# Patient Record
Sex: MALE | Race: WHITE | NOT HISPANIC OR LATINO | Employment: FULL TIME | URBAN - NONMETROPOLITAN AREA
[De-identification: names, ages, dates, MRNs, and addresses within clinical notes are randomized per-mention and may not be internally consistent; named-entity substitution may affect disease eponyms.]

---

## 2017-04-05 RX ORDER — LOSARTAN POTASSIUM 50 MG/1
TABLET ORAL
Qty: 90 TABLET | Refills: 2 | Status: SHIPPED | OUTPATIENT
Start: 2017-04-05 | End: 2017-04-20 | Stop reason: SDUPTHER

## 2017-04-20 PROBLEM — M79.609 PAIN IN EXTREMITY: Status: ACTIVE | Noted: 2017-04-20

## 2017-04-20 PROBLEM — N52.9 IMPOTENCE OF ORGANIC ORIGIN: Status: ACTIVE | Noted: 2017-04-20

## 2017-04-20 PROBLEM — I10 BENIGN ESSENTIAL HYPERTENSION: Status: ACTIVE | Noted: 2017-04-20

## 2017-04-20 PROBLEM — M25.549 HAND JOINT PAIN: Status: ACTIVE | Noted: 2017-04-20

## 2017-04-20 PROBLEM — K21.00 GASTROESOPHAGEAL REFLUX DISEASE WITH ESOPHAGITIS: Status: ACTIVE | Noted: 2017-04-20

## 2017-04-20 PROBLEM — E78.2 MULTIPLE-TYPE HYPERLIPIDEMIA: Status: ACTIVE | Noted: 2017-04-20

## 2017-04-20 PROBLEM — E55.9 VITAMIN D DEFICIENCY: Status: ACTIVE | Noted: 2017-04-20

## 2017-04-20 PROBLEM — M25.569 KNEE PAIN: Status: ACTIVE | Noted: 2017-04-20

## 2017-04-20 PROBLEM — R25.2 SPASM: Status: ACTIVE | Noted: 2017-04-20

## 2017-04-20 PROBLEM — I48.91 ATRIAL FIBRILLATION: Status: ACTIVE | Noted: 2017-04-20

## 2017-04-20 PROBLEM — M79.10 MUSCLE PAIN: Status: ACTIVE | Noted: 2017-04-20

## 2017-04-20 RX ORDER — LOSARTAN POTASSIUM 50 MG/1
50 TABLET ORAL DAILY
Qty: 90 TABLET | Refills: 3 | Status: SHIPPED | OUTPATIENT
Start: 2017-04-20 | End: 2018-03-26 | Stop reason: SDUPTHER

## 2017-04-20 RX ORDER — DILTIAZEM HYDROCHLORIDE 120 MG/1
120 TABLET, FILM COATED ORAL DAILY
Qty: 90 TABLET | Refills: 3 | Status: SHIPPED | OUTPATIENT
Start: 2017-04-20 | End: 2017-08-02 | Stop reason: SDUPTHER

## 2017-04-20 NOTE — TELEPHONE ENCOUNTER
Patient requests refills on blood pressure medicine.  Patient given an appointment for 5/22/2017 @8:00.

## 2017-04-21 ENCOUNTER — TELEPHONE (OUTPATIENT)
Dept: FAMILY MEDICINE CLINIC | Facility: CLINIC | Age: 52
End: 2017-04-21

## 2017-04-21 RX ORDER — DILTIAZEM HYDROCHLORIDE 120 MG/1
120 CAPSULE, EXTENDED RELEASE ORAL DAILY
Qty: 90 CAPSULE | Refills: 1 | Status: SHIPPED | OUTPATIENT
Start: 2017-04-21 | End: 2017-10-13 | Stop reason: SDUPTHER

## 2017-04-21 NOTE — TELEPHONE ENCOUNTER
Maya with [facility] in [place] called stating that patient is usually on Diltiazem 120 ER CAPSULES, not tablets. Resent correctly per dr keenan.

## 2017-05-22 ENCOUNTER — OFFICE VISIT (OUTPATIENT)
Dept: FAMILY MEDICINE CLINIC | Facility: CLINIC | Age: 52
End: 2017-05-22

## 2017-05-22 VITALS
OXYGEN SATURATION: 99 % | DIASTOLIC BLOOD PRESSURE: 82 MMHG | WEIGHT: 232 LBS | TEMPERATURE: 97.9 F | RESPIRATION RATE: 16 BRPM | SYSTOLIC BLOOD PRESSURE: 144 MMHG | BODY MASS INDEX: 31.46 KG/M2 | HEART RATE: 64 BPM

## 2017-05-22 DIAGNOSIS — Z00.00 ROUTINE GENERAL MEDICAL EXAMINATION AT A HEALTH CARE FACILITY: Primary | ICD-10-CM

## 2017-05-22 DIAGNOSIS — Z12.11 COLON CANCER SCREENING: ICD-10-CM

## 2017-05-22 PROCEDURE — 99396 PREV VISIT EST AGE 40-64: CPT | Performed by: INTERNAL MEDICINE

## 2017-05-23 LAB
ALBUMIN SERPL-MCNC: 4.2 G/DL (ref 3.5–5.5)
ALBUMIN/GLOB SERPL: 1.5 {RATIO} (ref 1.2–2.2)
ALP SERPL-CCNC: 112 IU/L (ref 39–117)
ALT SERPL-CCNC: 22 IU/L (ref 0–44)
AST SERPL-CCNC: 20 IU/L (ref 0–40)
BASOPHILS # BLD AUTO: 0.1 X10E3/UL (ref 0–0.2)
BASOPHILS NFR BLD AUTO: 1 %
BILIRUB SERPL-MCNC: 0.3 MG/DL (ref 0–1.2)
BUN SERPL-MCNC: 16 MG/DL (ref 6–24)
BUN/CREAT SERPL: 20 (ref 9–20)
CALCIUM SERPL-MCNC: 8.6 MG/DL (ref 8.7–10.2)
CHLORIDE SERPL-SCNC: 103 MMOL/L (ref 96–106)
CHOLEST SERPL-MCNC: 166 MG/DL (ref 100–199)
CO2 SERPL-SCNC: 24 MMOL/L (ref 18–29)
CREAT SERPL-MCNC: 0.82 MG/DL (ref 0.76–1.27)
EOSINOPHIL # BLD AUTO: 0.2 X10E3/UL (ref 0–0.4)
EOSINOPHIL NFR BLD AUTO: 3 %
ERYTHROCYTE [DISTWIDTH] IN BLOOD BY AUTOMATED COUNT: 13.6 % (ref 12.3–15.4)
GLOBULIN SER CALC-MCNC: 2.8 G/DL (ref 1.5–4.5)
GLUCOSE SERPL-MCNC: 96 MG/DL (ref 65–99)
HCT VFR BLD AUTO: 45.4 % (ref 37.5–51)
HDLC SERPL-MCNC: 33 MG/DL
HGB BLD-MCNC: 15.7 G/DL (ref 12.6–17.7)
IMM GRANULOCYTES # BLD: 0 X10E3/UL (ref 0–0.1)
IMM GRANULOCYTES NFR BLD: 0 %
LDLC SERPL CALC-MCNC: 91 MG/DL (ref 0–99)
LYMPHOCYTES # BLD AUTO: 2.7 X10E3/UL (ref 0.7–3.1)
LYMPHOCYTES NFR BLD AUTO: 32 %
MCH RBC QN AUTO: 31.1 PG (ref 26.6–33)
MCHC RBC AUTO-ENTMCNC: 34.6 G/DL (ref 31.5–35.7)
MCV RBC AUTO: 90 FL (ref 79–97)
MONOCYTES # BLD AUTO: 0.8 X10E3/UL (ref 0.1–0.9)
MONOCYTES NFR BLD AUTO: 9 %
NEUTROPHILS # BLD AUTO: 4.8 X10E3/UL (ref 1.4–7)
NEUTROPHILS NFR BLD AUTO: 55 %
PLATELET # BLD AUTO: 220 X10E3/UL (ref 150–379)
POTASSIUM SERPL-SCNC: 4.1 MMOL/L (ref 3.5–5.2)
PROT SERPL-MCNC: 7 G/DL (ref 6–8.5)
RBC # BLD AUTO: 5.05 X10E6/UL (ref 4.14–5.8)
SODIUM SERPL-SCNC: 143 MMOL/L (ref 134–144)
TRIGL SERPL-MCNC: 209 MG/DL (ref 0–149)
VLDLC SERPL CALC-MCNC: 42 MG/DL (ref 5–40)
WBC # BLD AUTO: 8.6 X10E3/UL (ref 3.4–10.8)

## 2017-07-10 ENCOUNTER — PREP FOR SURGERY (OUTPATIENT)
Dept: OTHER | Facility: HOSPITAL | Age: 52
End: 2017-07-10

## 2017-07-10 ENCOUNTER — OFFICE VISIT (OUTPATIENT)
Dept: GASTROENTEROLOGY | Facility: CLINIC | Age: 52
End: 2017-07-10

## 2017-07-10 VITALS
SYSTOLIC BLOOD PRESSURE: 150 MMHG | WEIGHT: 233 LBS | BODY MASS INDEX: 32.62 KG/M2 | HEIGHT: 71 IN | TEMPERATURE: 97.8 F | DIASTOLIC BLOOD PRESSURE: 91 MMHG | HEART RATE: 78 BPM | RESPIRATION RATE: 15 BRPM

## 2017-07-10 DIAGNOSIS — Z12.11 COLON CANCER SCREENING: Primary | ICD-10-CM

## 2017-07-10 PROCEDURE — S0260 H&P FOR SURGERY: HCPCS | Performed by: NURSE PRACTITIONER

## 2017-07-10 RX ORDER — SODIUM CHLORIDE 9 MG/ML
70 INJECTION, SOLUTION INTRAVENOUS CONTINUOUS PRN
Status: CANCELLED | OUTPATIENT
Start: 2017-07-10

## 2017-07-10 RX ORDER — SODIUM CHLORIDE 0.9 % (FLUSH) 0.9 %
1-10 SYRINGE (ML) INJECTION AS NEEDED
Status: CANCELLED | OUTPATIENT
Start: 2017-07-10

## 2017-07-10 NOTE — PROGRESS NOTES
[address] [facility] [address]    Chief Complaint   Patient presents with   • Colon Cancer Screening     History of Present Illness     The patient denies recent change in bowel habits. There is no diarrhea or constipation. There is no history of abdominal pain. There is no history of overt GI bleed (hematemesis melena or hematochezia). The patient denies nausea or vomiting. There is no history of reflux. The patient denies dysphagia or odynophagia. There is no history of recent significant weight loss. There is no history of liver disease in the past. There is no family history of colon cancer. The patient has not had a colonoscopy in the past.    Review of Systems   Constitutional: Negative for appetite change, chills, fatigue, fever and unexpected weight change.   HENT: Negative for mouth sores, nosebleeds and trouble swallowing.    Eyes: Negative for discharge and redness.   Respiratory: Negative for apnea, cough and shortness of breath.    Cardiovascular: Negative for chest pain, palpitations and leg swelling.   Gastrointestinal: Negative for abdominal distention, abdominal pain, anal bleeding, blood in stool, constipation, diarrhea, nausea and vomiting.   Endocrine: Negative for cold intolerance, heat intolerance and polydipsia.   Genitourinary: Negative for dysuria, hematuria and urgency.   Musculoskeletal: Negative for arthralgias, joint swelling and myalgias.   Skin: Negative for rash.   Allergic/Immunologic: Negative for food allergies and immunocompromised state.   Neurological: Negative for dizziness, seizures, syncope and headaches.   Hematological: Negative for adenopathy. Bruises/bleeds easily.   Psychiatric/Behavioral: Negative for dysphoric mood. The patient is not nervous/anxious and is not hyperactive.      Patient Active Problem List   Diagnosis   • Atrial fibrillation   • Benign essential hypertension   • Gastroesophageal reflux disease with esophagitis   • Knee pain   • Impotence of  "organic origin   • Multiple-type hyperlipidemia   • Spasm   • Muscle pain   • Hand joint pain   • Pain in extremity   • Vitamin D deficiency   • Colon cancer screening     Past Medical History:   Diagnosis Date   • Allergy    • Atrial fibrillation    • Cough    • ED (erectile dysfunction) of non-organic origin    • Foot pain    • Hyperlipidemia    • Hypertension      Past Surgical History:   Procedure Laterality Date   • FACIAL RECONSTRUCTION SURGERY      from dog bite as a child   • KIDNEY STONE SURGERY     • KNEE SURGERY  2015     Family History   Problem Relation Age of Onset   • Other Other      brain tumor, renal disease   • Cancer Other    • Stroke Other    • Colon cancer Neg Hx      Social History   Substance Use Topics   • Smoking status: Never Smoker   • Smokeless tobacco: Never Used   • Alcohol use No       Current Outpatient Prescriptions:   •  aspirin 81 MG tablet, Take  by mouth., Disp: , Rfl:   •  diltiaZEM (CARDIZEM) 120 MG tablet, Take 1 tablet by mouth Daily., Disp: 90 tablet, Rfl: 3  •  diltiazem XR (DILACOR XR) 120 MG 24 hr capsule, Take 1 capsule by mouth Daily., Disp: 90 capsule, Rfl: 1  •  losartan (COZAAR) 50 MG tablet, Take 1 tablet by mouth Daily., Disp: 90 tablet, Rfl: 3  •  tadalafil (CIALIS) 20 MG tablet, Take 1 tablet by mouth as needed. 1 hour before activity as needed, Disp: , Rfl:     No Known Allergies    /91  Pulse 78  Temp 97.8 °F (36.6 °C)  Resp 15  Ht 71\" (180.3 cm)  Wt 233 lb (106 kg)  BMI 32.5 kg/m2    Physical Exam   Constitutional: He is oriented to person, place, and time. He appears well-developed and well-nourished. No distress.   HENT:   Head: Normocephalic and atraumatic.   Right Ear: Hearing and external ear normal.   Left Ear: Hearing and external ear normal.   Nose: Nose normal.   Mouth/Throat: Oropharynx is clear and moist and mucous membranes are normal. Mucous membranes are not pale, not dry and not cyanotic. No oral lesions. No oropharyngeal exudate. "   Eyes: Conjunctivae and EOM are normal. Right eye exhibits no discharge. Left eye exhibits no discharge.   Neck: Trachea normal. Neck supple. No JVD present. No edema present. No thyroid mass and no thyromegaly present.   Cardiovascular: Normal rate, regular rhythm, S2 normal and normal heart sounds.  Exam reveals no gallop, no S3 and no friction rub.    No murmur heard.  Pulmonary/Chest: Effort normal and breath sounds normal. No respiratory distress. He exhibits no tenderness.   Abdominal: Normal appearance and bowel sounds are normal. He exhibits no distension, no ascites and no mass. There is no splenomegaly or hepatomegaly. There is no tenderness. There is no rigidity, no rebound and no guarding. No hernia.       Vascular Status -  His exam exhibits no right foot edema. His exam exhibits no left foot edema.  Lymphadenopathy:     He has no cervical adenopathy.        Left: No supraclavicular adenopathy present.   Neurological: He is alert and oriented to person, place, and time. He has normal strength. No cranial nerve deficit or sensory deficit.   Skin: No rash noted. He is not diaphoretic. No cyanosis. No pallor. Nails show no clubbing.   Psychiatric: He has a normal mood and affect.   Nursing note and vitals reviewed.  Stigmata of chronic liver disease:  None.  Asterixis:  None.    Laboratory Results:  Upon review of records:    Dated 5/22/2017 glucose 96 BUN 16 creatinine 0.82 sodium 143 potassium 4.1 chloride 103 CO2 24 calcium 8.6 albumin 4.2 total bilirubin 0.3 alkaline phosphatase 112 AST 20 ALT 22 WBC 8.6 hemoglobin 15.7 hematocrit 45.4 platelet count 220 MCV 90    Assessment and Plan:      Benjie was seen today for colon cancer screening.    Diagnoses and all orders for this visit:    Colon cancer screening  Comments:  No previous colonoscopy. No family history of colon cancer.        Plan  and Patient Instructions:  Patient Instructions   1. Colonoscopy: Description of the procedure, risks, benefits,  alternatives and options, including nonoperative options, were discussed with the patient in detail. The patient understands and wishes to proceed.    Patient Care Team:  Evie Martin MD as PCP - General    Cristi Smith, APRN

## 2017-08-02 ENCOUNTER — APPOINTMENT (OUTPATIENT)
Dept: PREADMISSION TESTING | Facility: HOSPITAL | Age: 52
End: 2017-08-02

## 2017-08-07 ENCOUNTER — ANESTHESIA EVENT (OUTPATIENT)
Dept: GASTROENTEROLOGY | Facility: HOSPITAL | Age: 52
End: 2017-08-07

## 2017-08-07 ENCOUNTER — HOSPITAL ENCOUNTER (OUTPATIENT)
Facility: HOSPITAL | Age: 52
Setting detail: HOSPITAL OUTPATIENT SURGERY
Discharge: HOME | End: 2017-08-07
Attending: INTERNAL MEDICINE | Admitting: INTERNAL MEDICINE

## 2017-08-07 ENCOUNTER — ANESTHESIA (OUTPATIENT)
Dept: GASTROENTEROLOGY | Facility: HOSPITAL | Age: 52
End: 2017-08-07

## 2017-08-07 VITALS
BODY MASS INDEX: 32.62 KG/M2 | DIASTOLIC BLOOD PRESSURE: 77 MMHG | OXYGEN SATURATION: 94 % | RESPIRATION RATE: 14 BRPM | HEIGHT: 71 IN | SYSTOLIC BLOOD PRESSURE: 117 MMHG | TEMPERATURE: 97.4 F | WEIGHT: 233 LBS | HEART RATE: 64 BPM

## 2017-08-07 DIAGNOSIS — Z12.11 SCREEN FOR COLON CANCER: ICD-10-CM

## 2017-08-07 DIAGNOSIS — Z12.11 COLON CANCER SCREENING: ICD-10-CM

## 2017-08-07 PROCEDURE — 25010000002 MIDAZOLAM PER 1 MG: Performed by: NURSE ANESTHETIST, CERTIFIED REGISTERED

## 2017-08-07 PROCEDURE — 25010000002 MEPERIDINE PER 100 MG: Performed by: NURSE ANESTHETIST, CERTIFIED REGISTERED

## 2017-08-07 PROCEDURE — 45380 COLONOSCOPY AND BIOPSY: CPT | Performed by: INTERNAL MEDICINE

## 2017-08-07 PROCEDURE — 25010000002 PROPOFOL 1000 MG/100ML EMULSION: Performed by: NURSE ANESTHETIST, CERTIFIED REGISTERED

## 2017-08-07 RX ORDER — PROPOFOL 10 MG/ML
INJECTION, EMULSION INTRAVENOUS AS NEEDED
Status: DISCONTINUED | OUTPATIENT
Start: 2017-08-07 | End: 2017-08-07 | Stop reason: SURG

## 2017-08-07 RX ORDER — SODIUM CHLORIDE 0.9 % (FLUSH) 0.9 %
1-10 SYRINGE (ML) INJECTION AS NEEDED
Status: DISCONTINUED | OUTPATIENT
Start: 2017-08-07 | End: 2017-08-07 | Stop reason: HOSPADM

## 2017-08-07 RX ORDER — SODIUM CHLORIDE 9 MG/ML
70 INJECTION, SOLUTION INTRAVENOUS CONTINUOUS PRN
Status: DISCONTINUED | OUTPATIENT
Start: 2017-08-07 | End: 2017-08-07 | Stop reason: HOSPADM

## 2017-08-07 RX ORDER — MEPERIDINE HYDROCHLORIDE 50 MG/ML
INJECTION INTRAMUSCULAR; INTRAVENOUS; SUBCUTANEOUS AS NEEDED
Status: DISCONTINUED | OUTPATIENT
Start: 2017-08-07 | End: 2017-08-07 | Stop reason: SURG

## 2017-08-07 RX ORDER — MIDAZOLAM HYDROCHLORIDE 1 MG/ML
INJECTION INTRAMUSCULAR; INTRAVENOUS AS NEEDED
Status: DISCONTINUED | OUTPATIENT
Start: 2017-08-07 | End: 2017-08-07 | Stop reason: SURG

## 2017-08-07 RX ADMIN — PROPOFOL 50 MG: 10 INJECTION, EMULSION INTRAVENOUS at 10:55

## 2017-08-07 RX ADMIN — MIDAZOLAM HYDROCHLORIDE 2 MG: 1 INJECTION, SOLUTION INTRAMUSCULAR; INTRAVENOUS at 10:53

## 2017-08-07 RX ADMIN — PROPOFOL 50 MG: 10 INJECTION, EMULSION INTRAVENOUS at 11:06

## 2017-08-07 RX ADMIN — SODIUM CHLORIDE 70 ML/HR: 9 INJECTION, SOLUTION INTRAVENOUS at 09:58

## 2017-08-07 RX ADMIN — PROPOFOL 50 MG: 10 INJECTION, EMULSION INTRAVENOUS at 10:58

## 2017-08-07 RX ADMIN — PROPOFOL 50 MG: 10 INJECTION, EMULSION INTRAVENOUS at 11:02

## 2017-08-07 RX ADMIN — MIDAZOLAM HYDROCHLORIDE 2 MG: 1 INJECTION, SOLUTION INTRAMUSCULAR; INTRAVENOUS at 10:56

## 2017-08-07 RX ADMIN — MEPERIDINE HYDROCHLORIDE 50 MG: 50 INJECTION INTRAMUSCULAR; INTRAVENOUS; SUBCUTANEOUS at 10:56

## 2017-08-07 NOTE — PLAN OF CARE
Problem: GI Endoscopy (Adult)  Goal: Signs and Symptoms of Listed Potential Problems Will be Absent or Manageable (GI Endoscopy)  Outcome: Ongoing (interventions implemented as appropriate)    08/07/17 0949   GI Endoscopy   Problems Assessed (GI Endoscopy) all   Problems Present (GI Endoscopy) none

## 2017-08-07 NOTE — ANESTHESIA POSTPROCEDURE EVALUATION
Patient: Benjie Mcgovern    Procedure Summary     Date Anesthesia Start Anesthesia Stop Room / Location    08/07/17 1051  [facility] ENDOSCOPY 2 / [facility] ENDOSCOPY       Procedure Diagnosis Surgeon Provider    COLONOSCOPY WITH BXS, POLYPECTOMY (N/A Anus) No diagnosis on file. MD Segundo Denny CRNA          Anesthesia Type: MAC  Last vitals  BP        Temp        Pulse       Resp        SpO2          Post Anesthesia Care and Evaluation    Patient location during evaluation: PHASE II  Patient participation: complete - patient participated  Level of consciousness: awake  Pain score: 0  Pain management: adequate  Airway patency: patent  Anesthetic complications: No anesthetic complications  PONV Status: none  Cardiovascular status: acceptable  Respiratory status: acceptable  Hydration status: acceptable    Comments: vsss resp spont, reflexes intact, responsive, report given to pacu nurse

## 2017-08-07 NOTE — H&P (VIEW-ONLY)
[address] [facility] [address]    Chief Complaint   Patient presents with   • Colon Cancer Screening     History of Present Illness     The patient denies recent change in bowel habits. There is no diarrhea or constipation. There is no history of abdominal pain. There is no history of overt GI bleed (hematemesis melena or hematochezia). The patient denies nausea or vomiting. There is no history of reflux. The patient denies dysphagia or odynophagia. There is no history of recent significant weight loss. There is no history of liver disease in the past. There is no family history of colon cancer. The patient has not had a colonoscopy in the past.    Review of Systems   Constitutional: Negative for appetite change, chills, fatigue, fever and unexpected weight change.   HENT: Negative for mouth sores, nosebleeds and trouble swallowing.    Eyes: Negative for discharge and redness.   Respiratory: Negative for apnea, cough and shortness of breath.    Cardiovascular: Negative for chest pain, palpitations and leg swelling.   Gastrointestinal: Negative for abdominal distention, abdominal pain, anal bleeding, blood in stool, constipation, diarrhea, nausea and vomiting.   Endocrine: Negative for cold intolerance, heat intolerance and polydipsia.   Genitourinary: Negative for dysuria, hematuria and urgency.   Musculoskeletal: Negative for arthralgias, joint swelling and myalgias.   Skin: Negative for rash.   Allergic/Immunologic: Negative for food allergies and immunocompromised state.   Neurological: Negative for dizziness, seizures, syncope and headaches.   Hematological: Negative for adenopathy. Bruises/bleeds easily.   Psychiatric/Behavioral: Negative for dysphoric mood. The patient is not nervous/anxious and is not hyperactive.      Patient Active Problem List   Diagnosis   • Atrial fibrillation   • Benign essential hypertension   • Gastroesophageal reflux disease with esophagitis   • Knee pain   • Impotence of  "organic origin   • Multiple-type hyperlipidemia   • Spasm   • Muscle pain   • Hand joint pain   • Pain in extremity   • Vitamin D deficiency   • Colon cancer screening     Past Medical History:   Diagnosis Date   • Allergy    • Atrial fibrillation    • Cough    • ED (erectile dysfunction) of non-organic origin    • Foot pain    • Hyperlipidemia    • Hypertension      Past Surgical History:   Procedure Laterality Date   • FACIAL RECONSTRUCTION SURGERY      from dog bite as a child   • KIDNEY STONE SURGERY     • KNEE SURGERY  2015     Family History   Problem Relation Age of Onset   • Other Other      brain tumor, renal disease   • Cancer Other    • Stroke Other    • Colon cancer Neg Hx      Social History   Substance Use Topics   • Smoking status: Never Smoker   • Smokeless tobacco: Never Used   • Alcohol use No       Current Outpatient Prescriptions:   •  aspirin 81 MG tablet, Take  by mouth., Disp: , Rfl:   •  diltiaZEM (CARDIZEM) 120 MG tablet, Take 1 tablet by mouth Daily., Disp: 90 tablet, Rfl: 3  •  diltiazem XR (DILACOR XR) 120 MG 24 hr capsule, Take 1 capsule by mouth Daily., Disp: 90 capsule, Rfl: 1  •  losartan (COZAAR) 50 MG tablet, Take 1 tablet by mouth Daily., Disp: 90 tablet, Rfl: 3  •  tadalafil (CIALIS) 20 MG tablet, Take 1 tablet by mouth as needed. 1 hour before activity as needed, Disp: , Rfl:     No Known Allergies    /91  Pulse 78  Temp 97.8 °F (36.6 °C)  Resp 15  Ht 71\" (180.3 cm)  Wt 233 lb (106 kg)  BMI 32.5 kg/m2    Physical Exam   Constitutional: He is oriented to person, place, and time. He appears well-developed and well-nourished. No distress.   HENT:   Head: Normocephalic and atraumatic.   Right Ear: Hearing and external ear normal.   Left Ear: Hearing and external ear normal.   Nose: Nose normal.   Mouth/Throat: Oropharynx is clear and moist and mucous membranes are normal. Mucous membranes are not pale, not dry and not cyanotic. No oral lesions. No oropharyngeal exudate. "   Eyes: Conjunctivae and EOM are normal. Right eye exhibits no discharge. Left eye exhibits no discharge.   Neck: Trachea normal. Neck supple. No JVD present. No edema present. No thyroid mass and no thyromegaly present.   Cardiovascular: Normal rate, regular rhythm, S2 normal and normal heart sounds.  Exam reveals no gallop, no S3 and no friction rub.    No murmur heard.  Pulmonary/Chest: Effort normal and breath sounds normal. No respiratory distress. He exhibits no tenderness.   Abdominal: Normal appearance and bowel sounds are normal. He exhibits no distension, no ascites and no mass. There is no splenomegaly or hepatomegaly. There is no tenderness. There is no rigidity, no rebound and no guarding. No hernia.       Vascular Status -  His exam exhibits no right foot edema. His exam exhibits no left foot edema.  Lymphadenopathy:     He has no cervical adenopathy.        Left: No supraclavicular adenopathy present.   Neurological: He is alert and oriented to person, place, and time. He has normal strength. No cranial nerve deficit or sensory deficit.   Skin: No rash noted. He is not diaphoretic. No cyanosis. No pallor. Nails show no clubbing.   Psychiatric: He has a normal mood and affect.   Nursing note and vitals reviewed.  Stigmata of chronic liver disease:  None.  Asterixis:  None.    Laboratory Results:  Upon review of records:    Dated 5/22/2017 glucose 96 BUN 16 creatinine 0.82 sodium 143 potassium 4.1 chloride 103 CO2 24 calcium 8.6 albumin 4.2 total bilirubin 0.3 alkaline phosphatase 112 AST 20 ALT 22 WBC 8.6 hemoglobin 15.7 hematocrit 45.4 platelet count 220 MCV 90    Assessment and Plan:      Benjie was seen today for colon cancer screening.    Diagnoses and all orders for this visit:    Colon cancer screening  Comments:  No previous colonoscopy. No family history of colon cancer.        Plan  and Patient Instructions:  Patient Instructions   1. Colonoscopy: Description of the procedure, risks, benefits,  alternatives and options, including nonoperative options, were discussed with the patient in detail. The patient understands and wishes to proceed.    Patient Care Team:  Evie Martin MD as PCP - General    Cristi Smith, APRN

## 2017-08-07 NOTE — OP NOTE
PROCEDURE:  Colonoscopy to the terminal ileum with one cold biopsy polypectomy, and biopsies.     DATE OF PROCEDURE:  August 7, 2017    REFERRING PROVIDER:  Evie Martin MD     INSTRUMENT USED:  Olympus PCF H 190 videocolonoscope.      INDICATIONS OF THE PROCEDURE: This is a 52-year-old white male for colon cancer screening.       BIOPSIES:  Polypoid area in terminal ileum.  A cold biopsy polypectomy was performed in the ascending colon.      PHOTOGRAPHS:  Photographs were included in the medical records.     MEDICATIONS:  MAC.       CONSENT/PREPROCEDURE EVALUATION:  Risks, benefits, alternatives and options of the procedure including risks of sedation/anesthesia were discussed with the patient and informed consent was obtained prior to the procedure.  History and physical examination were performed and nothing precluded the test.      REPORT:  The patient was placed in left lateral decubitus position and a digital examination was performed.  Once under the influence of IV sedation, the instrument was inserted into the rectum and advanced under direct vision to cecum which was identified by the ileocecal valve, triradiate folds and appendiceal orifice. The scope was then maneuvered into the terminal ileum.        FINDINGS:      Digital rectal examination:  Good anal tone.  No perianal pathology.  No mass.        Terminal ileum: 3-4 cm.  Somewhat polypoid area was noted in the terminal ileum.  This was biopsied.      Cecum and ascending colon: A 2-3 mm diminutive polyp was noted in the ascending colon.  This was removed with cold biopsy forceps.     Hepatic flexure, transverse colon, splenic flexure:  Normal.         Descending colon, sigmoid colon and rectum: Scant early diverticular change was noted in the left colon.  A retroflex examination within the rectum revealed internal hemorrhoids.        The scope was then straightened, the lower GI tract was decompressed, and the scope was pulled out of the patient.   The patient tolerated the procedure well.  There were no immediate complications and the patient was transferred in stable condition for post procedure observation.      TECHNICAL DATA:   1. Manor prep score: 9 (3+3+3).     2. Difficulty of examination:  Average.   3. Anus to cecal time 2 minutes.    4. Withdrawal time: 8 min.   5. Procedure Time: 13 minutes.   6. Retroflex examination in right colon: No.    7. Second look Rectum to cecum with decompression: Yes.    DIAGNOSES:    1. Scant early diverticular change in the left colon.  2. Small diminutive polyp.  3. Internal hemorrhoids.    RECOMMENDATIONS:     1. Follow biopsies.    2. Follow-up: The patient may call the office in 1 week regarding biopsy results.  However, the patient may follow-up in the office in 4 weeks if desired.  3. Followup colonoscopy in 5 years.     4. Dietary instructions.     Thank you very much for letting me participate in the care of this patient. Please do not hesitate to call me if you have any questions.

## 2017-08-07 NOTE — DISCHARGE INSTRUCTIONS
Rest today  No pushing,pulling,tugging,heavy lifting, or strenuous activity   No major decision making,driving,or drinking alcoholic beverages for 24 hours due to the sedation you received  Always use good hand hygiene/washing technique  No driving on pain medication.     To assist you in voiding:  Drink plenty of fluids  Listen to running water while attempting to void.    If you are unable to urinate and you have an uncomfortable urge to void or it has been   6 hours since you were discharged, return to the Emergency Room.        Diet:     Low Fat Diet.       Other Instructions:      Call [facility] at [phone] or come to the Emergency   Department if you experience the following: Chest pain, abdominal pain, bleeding  (vomiting of blood or coffee colored material, black stools or leela blood in stools),   fever/chills, nausea and vomiting or dizziness.    The patient may call the office in 1 week regarding biopsy results.  However, the patient may follow-up in the office in 4 weeks if desired.

## 2017-08-07 NOTE — ANESTHESIA PREPROCEDURE EVALUATION
Anesthesia Evaluation     Patient summary reviewed and Nursing notes reviewed   no history of anesthetic complications:  NPO Solid Status: > 8 hours  NPO Liquid Status: > 6 hours     Airway   Mallampati: II  TM distance: >3 FB  Neck ROM: full  possible difficult intubation  Dental - normal exam     Pulmonary - normal exam   (+) sleep apnea,   (-) not a smoker  Cardiovascular - normal exam    (+) hypertension, dysrhythmias Atrial Fib, hyperlipidemia      Neuro/Psych  (+) psychiatric history Anxiety and Depression,    GI/Hepatic/Renal/Endo    (+) obesity,      Musculoskeletal     (+) arthralgias, back pain, chronic pain,   Abdominal  - normal exam   Substance History      OB/GYN          Other   (+) arthritis                                   Anesthesia Plan    ASA 3     MAC   (Risks and benefits discussed including risk of aspiration, recall and dental damage. All patient questions answered. Will continue with POC.)  intravenous induction   Anesthetic plan and risks discussed with patient.

## 2017-08-08 ENCOUNTER — TELEPHONE (OUTPATIENT)
Dept: FAMILY MEDICINE CLINIC | Facility: CLINIC | Age: 52
End: 2017-08-08

## 2017-08-08 NOTE — TELEPHONE ENCOUNTER
Patient has a bulge above his belly button and it is very tender if he brushes against it and when lifting heavy packages.  He thinks he may have a hernia.  What are his options?

## 2017-08-10 LAB
LAB AP CASE REPORT: NORMAL
LAB AP CLINICAL INFORMATION: NORMAL
Lab: NORMAL
PATH REPORT.FINAL DX SPEC: NORMAL

## 2017-08-14 ENCOUNTER — TELEPHONE (OUTPATIENT)
Dept: GASTROENTEROLOGY | Facility: CLINIC | Age: 52
End: 2017-08-14

## 2017-08-14 NOTE — TELEPHONE ENCOUNTER
----- Message from Cristel King MA sent at 8/14/2017 12:56 PM EDT -----  Would like colon results. His was a little more than just the polyps so wanted to make sure everything was okay. You can call or let me know. Thanks!      Called and discussed results with patient. Recommended follow up colonoscopy in 5 years. Patient verbalizes understanding.

## 2017-08-30 ENCOUNTER — TELEPHONE (OUTPATIENT)
Dept: FAMILY MEDICINE CLINIC | Facility: CLINIC | Age: 52
End: 2017-08-30

## 2017-08-30 NOTE — TELEPHONE ENCOUNTER
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  He has to do a urine drug screen so he is not allowed to take anything that would interfere with that.

## 2017-10-13 RX ORDER — DILTIAZEM HYDROCHLORIDE 120 MG/1
CAPSULE, COATED, EXTENDED RELEASE ORAL
Qty: 90 CAPSULE | Refills: 1 | Status: SHIPPED | OUTPATIENT
Start: 2017-10-13 | End: 2017-11-06 | Stop reason: SDUPTHER

## 2017-11-06 ENCOUNTER — OFFICE VISIT (OUTPATIENT)
Dept: FAMILY MEDICINE CLINIC | Facility: CLINIC | Age: 52
End: 2017-11-06

## 2017-11-06 VITALS
WEIGHT: 235 LBS | HEART RATE: 66 BPM | TEMPERATURE: 97.8 F | BODY MASS INDEX: 32.78 KG/M2 | SYSTOLIC BLOOD PRESSURE: 131 MMHG | RESPIRATION RATE: 16 BRPM | DIASTOLIC BLOOD PRESSURE: 88 MMHG | OXYGEN SATURATION: 100 %

## 2017-11-06 DIAGNOSIS — I10 BENIGN ESSENTIAL HYPERTENSION: Primary | ICD-10-CM

## 2017-11-06 DIAGNOSIS — E78.00 PURE HYPERCHOLESTEROLEMIA: ICD-10-CM

## 2017-11-06 DIAGNOSIS — I48.20 CHRONIC ATRIAL FIBRILLATION (HCC): ICD-10-CM

## 2017-11-06 LAB
ALBUMIN SERPL-MCNC: 4.3 G/DL (ref 3.5–5)
ALBUMIN/GLOB SERPL: 1.4 G/DL (ref 1–2)
ALP SERPL-CCNC: 117 U/L (ref 38–126)
ALT SERPL-CCNC: 42 U/L (ref 13–69)
AST SERPL-CCNC: 26 U/L (ref 15–46)
BASOPHILS # BLD AUTO: 0.07 10*3/MM3 (ref 0–0.2)
BASOPHILS NFR BLD AUTO: 0.8 % (ref 0–2.5)
BILIRUB SERPL-MCNC: 0.4 MG/DL (ref 0.2–1.3)
BUN SERPL-MCNC: 17 MG/DL (ref 7–20)
BUN/CREAT SERPL: 21.3 (ref 6.3–21.9)
CALCIUM SERPL-MCNC: 9.7 MG/DL (ref 8.4–10.2)
CHLORIDE SERPL-SCNC: 105 MMOL/L (ref 98–107)
CHOLEST SERPL-MCNC: 171 MG/DL (ref 0–199)
CO2 SERPL-SCNC: 24 MMOL/L (ref 26–30)
CREAT SERPL-MCNC: 0.8 MG/DL (ref 0.6–1.3)
EOSINOPHIL # BLD AUTO: 0.16 10*3/MM3 (ref 0–0.7)
EOSINOPHIL NFR BLD AUTO: 1.9 % (ref 0–7)
ERYTHROCYTE [DISTWIDTH] IN BLOOD BY AUTOMATED COUNT: 12.5 % (ref 11.5–14.5)
GFR SERPLBLD CREATININE-BSD FMLA CKD-EPI: 102 ML/MIN/1.73
GFR SERPLBLD CREATININE-BSD FMLA CKD-EPI: 124 ML/MIN/1.73
GLOBULIN SER CALC-MCNC: 3 GM/DL
GLUCOSE SERPL-MCNC: 98 MG/DL (ref 74–98)
HCT VFR BLD AUTO: 50.1 % (ref 42–52)
HDLC SERPL-MCNC: 36 MG/DL (ref 40–60)
HGB BLD-MCNC: 17 G/DL (ref 14–18)
IMM GRANULOCYTES # BLD: 0.02 10*3/MM3 (ref 0–0.06)
IMM GRANULOCYTES NFR BLD: 0.2 % (ref 0–0.6)
LDLC SERPL CALC-MCNC: 98 MG/DL (ref 0–99)
LYMPHOCYTES # BLD AUTO: 2.83 10*3/MM3 (ref 0.6–3.4)
LYMPHOCYTES NFR BLD AUTO: 34.3 % (ref 10–50)
MCH RBC QN AUTO: 30.9 PG (ref 27–31)
MCHC RBC AUTO-ENTMCNC: 33.9 G/DL (ref 30–37)
MCV RBC AUTO: 90.9 FL (ref 80–94)
MONOCYTES # BLD AUTO: 0.79 10*3/MM3 (ref 0–0.9)
MONOCYTES NFR BLD AUTO: 9.6 % (ref 0–12)
NEUTROPHILS # BLD AUTO: 4.38 10*3/MM3 (ref 2–6.9)
NEUTROPHILS NFR BLD AUTO: 53.2 % (ref 37–80)
NRBC BLD AUTO-RTO: 0 /100 WBC (ref 0–0)
PLATELET # BLD AUTO: 231 10*3/MM3 (ref 130–400)
POTASSIUM SERPL-SCNC: 4.4 MMOL/L (ref 3.5–5.1)
PROT SERPL-MCNC: 7.3 G/DL (ref 6.3–8.2)
RBC # BLD AUTO: 5.51 10*6/MM3 (ref 4.7–6.1)
SODIUM SERPL-SCNC: 143 MMOL/L (ref 137–145)
TRIGL SERPL-MCNC: 187 MG/DL
VLDLC SERPL CALC-MCNC: 37.4 MG/DL
WBC # BLD AUTO: 8.25 10*3/MM3 (ref 4.8–10.8)

## 2017-11-06 PROCEDURE — 99214 OFFICE O/P EST MOD 30 MIN: CPT | Performed by: INTERNAL MEDICINE

## 2017-11-06 RX ORDER — DILTIAZEM HYDROCHLORIDE 120 MG/1
120 CAPSULE, COATED, EXTENDED RELEASE ORAL NIGHTLY
Qty: 90 CAPSULE | Refills: 2 | Status: SHIPPED | OUTPATIENT
Start: 2017-11-06 | End: 2018-03-26 | Stop reason: SDUPTHER

## 2017-11-06 NOTE — PROGRESS NOTES
Subjective   Benjie Mcgovern is a 52 y.o. male.     Chief Complaint   Patient presents with   • Hypertension   • Hyperlipidemia   • Atrial Fibrillation       History of Present Illness   HPI: Patient is here to follow-up on his blood pressure. He has been taking medications as prescribed. His blood pressures been stable. Patient Is also here to follow-up on his atrial fibrillation and the rate is controlled on his current medication, Patient is also here to follow up on his cholesterol and is fasting for labs today , he delcines a flu shot , he needs refills on his medications . He had a colonoscopy  Primary Care Cardiac Diagnostic Constellation: The patient is here today for a follow-up visit.   His hypertension is primary, but stable. The patient is adherent with his medication regimen. He denies medication side effects.   His hyperlipidemia has been stable. His LDL goal is 100 mg/dL.   Symptoms: Denies chest pain.   Lifestyle and Disease Management:     The following portions of the patient's history were reviewed and updated as appropriate: allergies, current medications, past family history, past medical history, past social history, past surgical history and problem list.    Review of Systems   Constitutional: Negative for appetite change, fatigue and fever.   HENT: Negative for congestion, ear discharge, ear pain, sinus pressure and sore throat.    Eyes: Negative for pain and discharge.   Respiratory: Negative for cough, shortness of breath and wheezing.    Cardiovascular: Negative for chest pain, palpitations and leg swelling.   Gastrointestinal: Negative for abdominal pain, constipation, diarrhea, nausea and vomiting.   Endocrine: Negative for cold intolerance and heat intolerance.   Genitourinary: Negative for dysuria and flank pain.   Musculoskeletal: Negative for arthralgias and joint swelling.   Skin: Negative for pallor and rash.   Allergic/Immunologic: Negative for environmental allergies and food  allergies.   Neurological: Negative for dizziness, weakness and numbness.   Hematological: Negative for adenopathy. Does not bruise/bleed easily.   Psychiatric/Behavioral: Negative for behavioral problems and dysphoric mood. The patient is not nervous/anxious.          Current Outpatient Prescriptions:   •  aspirin 81 MG tablet, Take 81 mg by mouth Daily., Disp: , Rfl:   •  diltiaZEM CD (CARDIZEM CD) 120 MG 24 hr capsule, Take 1 capsule by mouth Every Night., Disp: 90 capsule, Rfl: 2  •  losartan (COZAAR) 50 MG tablet, Take 1 tablet by mouth Daily., Disp: 90 tablet, Rfl: 3    Objective     Blood pressure 131/88, pulse 66, temperature 97.8 °F (36.6 °C), temperature source Oral, resp. rate 16, weight 235 lb (107 kg), SpO2 100 %.    Physical Exam   Constitutional: He is oriented to person, place, and time. He appears well-developed and well-nourished. No distress.   HENT:   Head: Normocephalic and atraumatic.   Right Ear: External ear normal.   Left Ear: External ear normal.   Nose: Nose normal.   Mouth/Throat: Oropharynx is clear and moist.   Eyes: Conjunctivae and EOM are normal. Pupils are equal, round, and reactive to light.   Neck: Normal range of motion. Neck supple. No thyromegaly present.   Cardiovascular: Normal rate, regular rhythm and normal heart sounds.    Pulmonary/Chest: Effort normal. No respiratory distress.   Abdominal: Soft. He exhibits no distension. There is no tenderness. There is no guarding.   Musculoskeletal: Normal range of motion. He exhibits no edema.   Lymphadenopathy:     He has no cervical adenopathy.   Neurological: He is alert and oriented to person, place, and time.   No gross motor or sensory deficits   Skin: Skin is warm and dry. He is not diaphoretic. No erythema.   Psychiatric: He has a normal mood and affect.   Nursing note and vitals reviewed.      Results for orders placed or performed during the hospital encounter of 08/07/17   Tissue Pathology Exam   Result Value Ref Range     Case Report       Surgical Pathology Report                         Case: [id number]                                  Authorizing Provider:  Fermín Hamilton MD          Collected:           08/07/2017 11:09 AM          Ordering Location:     [facility]    Received:            08/07/2017 12:56 PM                                 [facility]                                                                    Pathologist:           Gary MCCRACKEN MD                                                           Specimens:   1) - Small Intestine, Ileum, terminal ileum nxs                                                     2) - Large Intestine, Right / Ascending Colon, ASCENDING COLON POLYP                       Clinical Information       Cold bx      Final Diagnosis       See Scanned Report        Embedded Images           Assessment/Plan   Benjie was seen today for hypertension, hyperlipidemia and atrial fibrillation.    Diagnoses and all orders for this visit:    Benign essential hypertension    Pure hypercholesterolemia  -     CBC & Differential  -     Comprehensive Metabolic Panel  -     Lipid Panel    Chronic atrial fibrillation    Other orders  -     diltiaZEM CD (CARDIZEM CD) 120 MG 24 hr capsule; Take 1 capsule by mouth Every Night.        Discussion Summary:   1.benign essential hypertension: Currently stable, will continue current medications, low  Sodium  diet advise.   2 .pure hypercholesterolemia  : low Cholesterol diet. Advised, will obtain fasting CMP and lipid panel today  3. Atrial fibrillation: Currently stable, will continue current medications and Obtain Lab Work         Evie Martin MD

## 2018-03-26 ENCOUNTER — OFFICE VISIT (OUTPATIENT)
Dept: INTERNAL MEDICINE | Facility: CLINIC | Age: 53
End: 2018-03-26

## 2018-03-26 VITALS
RESPIRATION RATE: 16 BRPM | HEART RATE: 71 BPM | SYSTOLIC BLOOD PRESSURE: 139 MMHG | WEIGHT: 241 LBS | OXYGEN SATURATION: 98 % | BODY MASS INDEX: 33.61 KG/M2 | DIASTOLIC BLOOD PRESSURE: 76 MMHG | TEMPERATURE: 98.1 F

## 2018-03-26 DIAGNOSIS — E78.2 MIXED HYPERLIPIDEMIA: ICD-10-CM

## 2018-03-26 DIAGNOSIS — I10 BENIGN ESSENTIAL HYPERTENSION: Primary | ICD-10-CM

## 2018-03-26 DIAGNOSIS — I48.20 CHRONIC ATRIAL FIBRILLATION (HCC): ICD-10-CM

## 2018-03-26 LAB
ALBUMIN SERPL-MCNC: 4.1 G/DL (ref 3.5–5)
ALBUMIN/GLOB SERPL: 1.3 G/DL (ref 1–2)
ALP SERPL-CCNC: 115 U/L (ref 38–126)
ALT SERPL-CCNC: 45 U/L (ref 13–69)
AST SERPL-CCNC: 28 U/L (ref 15–46)
BASOPHILS # BLD AUTO: 0.08 10*3/MM3 (ref 0–0.2)
BASOPHILS NFR BLD AUTO: 0.9 % (ref 0–2.5)
BILIRUB SERPL-MCNC: 0.4 MG/DL (ref 0.2–1.3)
BUN SERPL-MCNC: 15 MG/DL (ref 7–20)
BUN/CREAT SERPL: 21.4 (ref 6.3–21.9)
CALCIUM SERPL-MCNC: 9.5 MG/DL (ref 8.4–10.2)
CHLORIDE SERPL-SCNC: 104 MMOL/L (ref 98–107)
CHOLEST SERPL-MCNC: 163 MG/DL (ref 0–199)
CO2 SERPL-SCNC: 26 MMOL/L (ref 26–30)
CREAT SERPL-MCNC: 0.7 MG/DL (ref 0.6–1.3)
EOSINOPHIL # BLD AUTO: 0.11 10*3/MM3 (ref 0–0.7)
EOSINOPHIL NFR BLD AUTO: 1.3 % (ref 0–7)
ERYTHROCYTE [DISTWIDTH] IN BLOOD BY AUTOMATED COUNT: 12.5 % (ref 11.5–14.5)
GFR SERPLBLD CREATININE-BSD FMLA CKD-EPI: 119 ML/MIN/1.73
GFR SERPLBLD CREATININE-BSD FMLA CKD-EPI: 144 ML/MIN/1.73
GLOBULIN SER CALC-MCNC: 3.1 GM/DL
GLUCOSE SERPL-MCNC: 96 MG/DL (ref 74–98)
HCT VFR BLD AUTO: 47.7 % (ref 42–52)
HDLC SERPL-MCNC: 32 MG/DL (ref 40–60)
HGB BLD-MCNC: 16.1 G/DL (ref 14–18)
IMM GRANULOCYTES # BLD: 0.03 10*3/MM3 (ref 0–0.06)
IMM GRANULOCYTES NFR BLD: 0.3 % (ref 0–0.6)
LDLC SERPL CALC-MCNC: 90 MG/DL (ref 0–99)
LYMPHOCYTES # BLD AUTO: 2.4 10*3/MM3 (ref 0.6–3.4)
LYMPHOCYTES NFR BLD AUTO: 27.7 % (ref 10–50)
MCH RBC QN AUTO: 30.4 PG (ref 27–31)
MCHC RBC AUTO-ENTMCNC: 33.8 G/DL (ref 30–37)
MCV RBC AUTO: 90.2 FL (ref 80–94)
MONOCYTES # BLD AUTO: 0.86 10*3/MM3 (ref 0–0.9)
MONOCYTES NFR BLD AUTO: 9.9 % (ref 0–12)
NEUTROPHILS # BLD AUTO: 5.18 10*3/MM3 (ref 2–6.9)
NEUTROPHILS NFR BLD AUTO: 59.9 % (ref 37–80)
NRBC BLD AUTO-RTO: 0 /100 WBC (ref 0–0)
PLATELET # BLD AUTO: 229 10*3/MM3 (ref 130–400)
POTASSIUM SERPL-SCNC: 4.5 MMOL/L (ref 3.5–5.1)
PROT SERPL-MCNC: 7.2 G/DL (ref 6.3–8.2)
RBC # BLD AUTO: 5.29 10*6/MM3 (ref 4.7–6.1)
SODIUM SERPL-SCNC: 144 MMOL/L (ref 137–145)
TRIGL SERPL-MCNC: 207 MG/DL
VLDLC SERPL CALC-MCNC: 41.4 MG/DL
WBC # BLD AUTO: 8.66 10*3/MM3 (ref 4.8–10.8)

## 2018-03-26 PROCEDURE — 99214 OFFICE O/P EST MOD 30 MIN: CPT | Performed by: INTERNAL MEDICINE

## 2018-03-26 RX ORDER — DILTIAZEM HYDROCHLORIDE 120 MG/1
120 CAPSULE, COATED, EXTENDED RELEASE ORAL NIGHTLY
Qty: 90 CAPSULE | Refills: 2 | Status: SHIPPED | OUTPATIENT
Start: 2018-03-26 | End: 2018-12-19 | Stop reason: SDUPTHER

## 2018-03-26 RX ORDER — LOSARTAN POTASSIUM 50 MG/1
50 TABLET ORAL DAILY
Qty: 90 TABLET | Refills: 2 | Status: SHIPPED | OUTPATIENT
Start: 2018-03-26 | End: 2020-01-14 | Stop reason: SDUPTHER

## 2018-03-26 NOTE — PROGRESS NOTES
Subjective   Benjie Mcgovern is a 52 y.o. male.     Chief Complaint   Patient presents with   • Hypertension   • Hyperlipidemia   • Atrial Fibrillation       History of Present Illness   Patient is here to follow up  on the  blood pressure and cholesterol  The patient  is taking the blood pressure  medications as prescribed and  has had no side effects.   The patient  also needs refills on  medications . The patient  is also here to follow up  on  the  cholesterol  and   is trying to follow a diet. The patient  is due to get lab work done . Patient Is also to follow-up on atrial fibrillation which is rate controlled on current medication  Hypertension   Pertinent negatives include no chest pain, headaches, palpitations or shortness of breath.   Hyperlipidemia   Pertinent negatives include no chest pain or shortness of breath    The following portions of the patient's history were reviewed and updated as appropriate: allergies, current medications, past family history, past medical history, past social history, past surgical history and problem list.    Review of Systems   Constitutional: Negative for appetite change, fatigue and fever.   HENT: Negative for congestion, ear discharge, ear pain, sinus pressure and sore throat.    Eyes: Negative for pain and discharge.   Respiratory: Negative for cough, shortness of breath and wheezing.    Cardiovascular: Negative for chest pain, palpitations and leg swelling.   Gastrointestinal: Negative for abdominal pain, constipation, diarrhea, nausea and vomiting.   Endocrine: Negative for cold intolerance and heat intolerance.   Genitourinary: Negative for dysuria and flank pain.   Musculoskeletal: Negative for arthralgias and joint swelling.   Skin: Negative for pallor and rash.   Allergic/Immunologic: Negative for environmental allergies and food allergies.   Neurological: Negative for dizziness, weakness and numbness.   Hematological: Negative for adenopathy. Does not  bruise/bleed easily.   Psychiatric/Behavioral: Negative for behavioral problems and dysphoric mood. The patient is not nervous/anxious.          Current Outpatient Prescriptions:   •  aspirin 81 MG tablet, Take 81 mg by mouth Daily., Disp: , Rfl:   •  diltiaZEM CD (CARDIZEM CD) 120 MG 24 hr capsule, Take 1 capsule by mouth Every Night., Disp: 90 capsule, Rfl: 2  •  losartan (COZAAR) 50 MG tablet, Take 1 tablet by mouth Daily., Disp: 90 tablet, Rfl: 2    Objective     Blood pressure 139/76, pulse 71, temperature 98.1 °F (36.7 °C), temperature source Oral, resp. rate 16, weight 109 kg (241 lb), SpO2 98 %.    Physical Exam   Constitutional: He is oriented to person, place, and time. He appears well-developed and well-nourished. No distress.   HENT:   Head: Normocephalic and atraumatic.   Right Ear: External ear normal.   Left Ear: External ear normal.   Nose: Nose normal.   Mouth/Throat: Oropharynx is clear and moist.   Eyes: Conjunctivae and EOM are normal. Pupils are equal, round, and reactive to light.   Neck: Normal range of motion. Neck supple. No thyromegaly present.   Cardiovascular: Normal rate, regular rhythm and normal heart sounds.    Pulmonary/Chest: Effort normal. No respiratory distress.   Abdominal: Soft. He exhibits no distension. There is no tenderness. There is no guarding.   Musculoskeletal: Normal range of motion. He exhibits no edema.   Lymphadenopathy:     He has no cervical adenopathy.   Neurological: He is alert and oriented to person, place, and time.   No gross motor or sensory deficits   Skin: Skin is warm and dry. He is not diaphoretic. No erythema.   Psychiatric: He has a normal mood and affect.   Nursing note and vitals reviewed.      Results for orders placed or performed in visit on 03/26/18   Comprehensive Metabolic Panel   Result Value Ref Range    Glucose 96 74 - 98 mg/dL    BUN 15 7 - 20 mg/dL    Creatinine 0.70 0.60 - 1.30 mg/dL    eGFR Non African Am 119 >60 mL/min/1.73    eGFR   Am 144 >60 mL/min/1.73    BUN/Creatinine Ratio 21.4 6.3 - 21.9    Sodium 144 137 - 145 mmol/L    Potassium 4.5 3.5 - 5.1 mmol/L    Chloride 104 98 - 107 mmol/L    Total CO2 26.0 26.0 - 30.0 mmol/L    Calcium 9.5 8.4 - 10.2 mg/dL    Total Protein 7.2 6.3 - 8.2 g/dL    Albumin 4.10 3.50 - 5.00 g/dL    Globulin 3.1 gm/dL    A/G Ratio 1.3 1.0 - 2.0 g/dL    Total Bilirubin 0.4 0.2 - 1.3 mg/dL    Alkaline Phosphatase 115 38 - 126 U/L    AST (SGOT) 28 15 - 46 U/L    ALT (SGPT) 45 13 - 69 U/L   Lipid Panel   Result Value Ref Range    Total Cholesterol 163 0 - 199 mg/dL    Triglycerides 207 (H) <150 mg/dL    HDL Cholesterol 32 (L) 40 - 60 mg/dL    VLDL Cholesterol 41.4 mg/dL    LDL Cholesterol  90 0 - 99 mg/dL   CBC & Differential   Result Value Ref Range    WBC 8.66 4.80 - 10.80 10*3/mm3    RBC 5.29 4.70 - 6.10 10*6/mm3    Hemoglobin 16.1 14.0 - 18.0 g/dL    Hematocrit 47.7 42.0 - 52.0 %    MCV 90.2 80.0 - 94.0 fL    MCH 30.4 27.0 - 31.0 pg    MCHC 33.8 30.0 - 37.0 g/dL    RDW 12.5 11.5 - 14.5 %    Platelets 229 130 - 400 10*3/mm3    Neutrophil Rel % 59.9 37.0 - 80.0 %    Lymphocyte Rel % 27.7 10.0 - 50.0 %    Monocyte Rel % 9.9 0.0 - 12.0 %    Eosinophil Rel % 1.3 0.0 - 7.0 %    Basophil Rel % 0.9 0.0 - 2.5 %    Neutrophils Absolute 5.18 2.00 - 6.90 10*3/mm3    Lymphocytes Absolute 2.40 0.60 - 3.40 10*3/mm3    Monocytes Absolute 0.86 0.00 - 0.90 10*3/mm3    Eosinophils Absolute 0.11 0.00 - 0.70 10*3/mm3    Basophils Absolute 0.08 0.00 - 0.20 10*3/mm3    Immature Granulocyte Rel % 0.3 0.0 - 0.6 %    Immature Grans Absolute 0.03 0.00 - 0.06 10*3/mm3    nRBC 0.0 0.0 - 0.0 /100 WBC         Assessment/Plan   Benjie was seen today for hypertension, hyperlipidemia and atrial fibrillation.    Diagnoses and all orders for this visit:    Benign essential hypertension    Mixed hyperlipidemia  -     CBC & Differential  -     Comprehensive Metabolic Panel  -     Lipid Panel    Chronic atrial fibrillation    Other orders  -      diltiaZEM CD (CARDIZEM CD) 120 MG 24 hr capsule; Take 1 capsule by mouth Every Night.  -     losartan (COZAAR) 50 MG tablet; Take 1 tablet by mouth Daily.        Plan:  1.  Benign essential hypertension: Will continue current medication, low-sodium diet advised  2.mixed hyperlipidemia: We will obtain fasting CMP and lipid panel.  Diet and excise counseled, will continue current medication  3. Atrial fibrillation : rate controlled, on cardiazem , We will obtain fasting labs         Evie Martin MD

## 2018-07-18 DIAGNOSIS — M25.561 PAIN IN JOINT OF RIGHT KNEE: Primary | ICD-10-CM

## 2018-07-23 ENCOUNTER — OFFICE VISIT (OUTPATIENT)
Dept: ORTHOPEDIC SURGERY | Facility: CLINIC | Age: 53
End: 2018-07-23

## 2018-07-23 VITALS — WEIGHT: 240 LBS | BODY MASS INDEX: 33.6 KG/M2 | HEIGHT: 71 IN | RESPIRATION RATE: 18 BRPM

## 2018-07-23 DIAGNOSIS — M23.42 LOOSE BODY OF LEFT KNEE: ICD-10-CM

## 2018-07-23 DIAGNOSIS — M17.12 PRIMARY OSTEOARTHRITIS OF LEFT KNEE: ICD-10-CM

## 2018-07-23 DIAGNOSIS — M25.562 ARTHRALGIA OF LEFT KNEE: Primary | ICD-10-CM

## 2018-07-23 DIAGNOSIS — S83.8X2A MENISCAL INJURY, LEFT, INITIAL ENCOUNTER: ICD-10-CM

## 2018-07-23 PROCEDURE — 73560 X-RAY EXAM OF KNEE 1 OR 2: CPT | Performed by: ORTHOPAEDIC SURGERY

## 2018-07-23 PROCEDURE — 99202 OFFICE O/P NEW SF 15 MIN: CPT | Performed by: ORTHOPAEDIC SURGERY

## 2018-08-21 ENCOUNTER — OFFICE VISIT (OUTPATIENT)
Dept: ORTHOPEDIC SURGERY | Facility: CLINIC | Age: 53
End: 2018-08-21

## 2018-08-21 ENCOUNTER — OFFICE VISIT (OUTPATIENT)
Dept: INTERNAL MEDICINE | Facility: CLINIC | Age: 53
End: 2018-08-21

## 2018-08-21 VITALS — RESPIRATION RATE: 12 BRPM | HEIGHT: 71 IN | BODY MASS INDEX: 33.71 KG/M2 | WEIGHT: 240.8 LBS

## 2018-08-21 VITALS
TEMPERATURE: 98 F | BODY MASS INDEX: 33.44 KG/M2 | OXYGEN SATURATION: 97 % | HEART RATE: 71 BPM | DIASTOLIC BLOOD PRESSURE: 85 MMHG | RESPIRATION RATE: 16 BRPM | WEIGHT: 239.75 LBS | SYSTOLIC BLOOD PRESSURE: 140 MMHG

## 2018-08-21 DIAGNOSIS — M25.562 PAIN IN JOINT OF LEFT KNEE: ICD-10-CM

## 2018-08-21 DIAGNOSIS — M77.41 METATARSALGIA OF RIGHT FOOT: Primary | ICD-10-CM

## 2018-08-21 DIAGNOSIS — M25.571 PAIN IN JOINT OF RIGHT FOOT: ICD-10-CM

## 2018-08-21 DIAGNOSIS — E78.2 MIXED HYPERLIPIDEMIA: ICD-10-CM

## 2018-08-21 DIAGNOSIS — I10 BENIGN ESSENTIAL HYPERTENSION: Primary | ICD-10-CM

## 2018-08-21 PROCEDURE — 99203 OFFICE O/P NEW LOW 30 MIN: CPT | Performed by: PODIATRIST

## 2018-08-21 PROCEDURE — 99214 OFFICE O/P EST MOD 30 MIN: CPT | Performed by: INTERNAL MEDICINE

## 2018-08-21 NOTE — PROGRESS NOTES
Subjective   Benjie Mcgovern is a 53 y.o. male.     Chief Complaint   Patient presents with   • Hypertension   • Hyperlipidemia   • Joint Pain       History of Present Illness   HPI: Patient is here to follow up on the blood pressure  The patient is taking the blood pressure medications as prescribed and has had no side effects.  The patient is also here to follow up on the cholesterol and is trying to follow a diet. The patient is   due to get lab work done .  He complains of left knee pain and saw orthopedist and he complains of right foot pain and see podiatry today   Hyperlipidemia   Pertinent negatives include no chest pain or shortness of breath.   Hypertension   Pertinent negatives include no chest pain, palpitations or shortness of breath.    The following portions of the patient's history were reviewed and updated as appropriate: allergies, current medications, past family history, past medical history, past social history, past surgical history and problem list.    Review of Systems   Constitutional: Negative for appetite change, fatigue and fever.   HENT: Negative for congestion, ear discharge, ear pain, sinus pressure and sore throat.    Eyes: Negative for pain and discharge.   Respiratory: Negative for cough, shortness of breath and wheezing.    Cardiovascular: Negative for chest pain, palpitations and leg swelling.   Gastrointestinal: Negative for abdominal pain, constipation, diarrhea, nausea and vomiting.   Endocrine: Negative for cold intolerance and heat intolerance.   Genitourinary: Negative for dysuria and flank pain.   Musculoskeletal: Positive for arthralgias. Negative for joint swelling.        Left knee  Right foot   Skin: Negative for pallor and rash.   Allergic/Immunologic: Negative for environmental allergies and food allergies.   Neurological: Negative for dizziness, weakness and numbness.   Hematological: Negative for adenopathy. Does not bruise/bleed easily.   Psychiatric/Behavioral:  Negative for behavioral problems and dysphoric mood. The patient is not nervous/anxious.          Current Outpatient Prescriptions:   •  aspirin 81 MG tablet, Take 81 mg by mouth Daily., Disp: , Rfl:   •  diltiaZEM CD (CARDIZEM CD) 120 MG 24 hr capsule, Take 1 capsule by mouth Every Night., Disp: 90 capsule, Rfl: 2  •  losartan (COZAAR) 50 MG tablet, Take 1 tablet by mouth Daily., Disp: 90 tablet, Rfl: 2    Objective     Blood pressure 140/85, pulse 71, temperature 98 °F (36.7 °C), temperature source Oral, resp. rate 16, weight 109 kg (239 lb 12 oz), SpO2 97 %.    Physical Exam   Constitutional: He is oriented to person, place, and time. He appears well-developed and well-nourished. No distress.   HENT:   Head: Normocephalic and atraumatic.   Right Ear: External ear normal.   Left Ear: External ear normal.   Nose: Nose normal.   Mouth/Throat: Oropharynx is clear and moist.   Eyes: Pupils are equal, round, and reactive to light. Conjunctivae and EOM are normal.   Neck: Normal range of motion. Neck supple. No thyromegaly present.   Cardiovascular: Normal rate, regular rhythm and normal heart sounds.    Pulmonary/Chest: Effort normal. No respiratory distress.   Abdominal: Soft. He exhibits no distension. There is no tenderness. There is no guarding.   Musculoskeletal: He exhibits no edema.   Left knee  Right foot   Lymphadenopathy:     He has no cervical adenopathy.   Neurological: He is alert and oriented to person, place, and time.   No gross motor or sensory deficits   Skin: Skin is warm and dry. He is not diaphoretic. No erythema.   Psychiatric: He has a normal mood and affect.   Nursing note and vitals reviewed.      Results for orders placed or performed in visit on 03/26/18   Comprehensive Metabolic Panel   Result Value Ref Range    Glucose 96 74 - 98 mg/dL    BUN 15 7 - 20 mg/dL    Creatinine 0.70 0.60 - 1.30 mg/dL    eGFR Non African Am 119 >60 mL/min/1.73    eGFR African Am 144 >60 mL/min/1.73     BUN/Creatinine Ratio 21.4 6.3 - 21.9    Sodium 144 137 - 145 mmol/L    Potassium 4.5 3.5 - 5.1 mmol/L    Chloride 104 98 - 107 mmol/L    Total CO2 26.0 26.0 - 30.0 mmol/L    Calcium 9.5 8.4 - 10.2 mg/dL    Total Protein 7.2 6.3 - 8.2 g/dL    Albumin 4.10 3.50 - 5.00 g/dL    Globulin 3.1 gm/dL    A/G Ratio 1.3 1.0 - 2.0 g/dL    Total Bilirubin 0.4 0.2 - 1.3 mg/dL    Alkaline Phosphatase 115 38 - 126 U/L    AST (SGOT) 28 15 - 46 U/L    ALT (SGPT) 45 13 - 69 U/L   Lipid Panel   Result Value Ref Range    Total Cholesterol 163 0 - 199 mg/dL    Triglycerides 207 (H) <150 mg/dL    HDL Cholesterol 32 (L) 40 - 60 mg/dL    VLDL Cholesterol 41.4 mg/dL    LDL Cholesterol  90 0 - 99 mg/dL   CBC & Differential   Result Value Ref Range    WBC 8.66 4.80 - 10.80 10*3/mm3    RBC 5.29 4.70 - 6.10 10*6/mm3    Hemoglobin 16.1 14.0 - 18.0 g/dL    Hematocrit 47.7 42.0 - 52.0 %    MCV 90.2 80.0 - 94.0 fL    MCH 30.4 27.0 - 31.0 pg    MCHC 33.8 30.0 - 37.0 g/dL    RDW 12.5 11.5 - 14.5 %    Platelets 229 130 - 400 10*3/mm3    Neutrophil Rel % 59.9 37.0 - 80.0 %    Lymphocyte Rel % 27.7 10.0 - 50.0 %    Monocyte Rel % 9.9 0.0 - 12.0 %    Eosinophil Rel % 1.3 0.0 - 7.0 %    Basophil Rel % 0.9 0.0 - 2.5 %    Neutrophils Absolute 5.18 2.00 - 6.90 10*3/mm3    Lymphocytes Absolute 2.40 0.60 - 3.40 10*3/mm3    Monocytes Absolute 0.86 0.00 - 0.90 10*3/mm3    Eosinophils Absolute 0.11 0.00 - 0.70 10*3/mm3    Basophils Absolute 0.08 0.00 - 0.20 10*3/mm3    Immature Granulocyte Rel % 0.3 0.0 - 0.6 %    Immature Grans Absolute 0.03 0.00 - 0.06 10*3/mm3    nRBC 0.0 0.0 - 0.0 /100 WBC         Assessment/Plan   Benjie was seen today for hypertension, hyperlipidemia and joint pain.    Diagnoses and all orders for this visit:    Benign essential hypertension    Mixed hyperlipidemia  -     CBC & Differential  -     Comprehensive Metabolic Panel  -     Lipid Panel    Pain in joint of left knee    Pain in joint of right foot        Plan:  1.  Benign essential  hypertension: Will continue current medication, low-sodium diet advised  2.mixed hyperlipidemia: will obtain   fasting CMP and lipid panel.  Diet and excise counseled,     3.  Left knee pain :  Per orthopedist   4. Right foot pain : per podiatry       Evie Martin MD

## 2018-08-21 NOTE — PROGRESS NOTES
Subjective   Patient ID: Benjie Mcgovern is a 53 y.o. male   Pain and Consult of the Right Foot (Pt here today for pain bottom of right foot x 1 year. )  He presents today with a chief complaint of a plantar right foot pain.  He tells me that he's had power step inserts for years and has worn them for years and his feet did well but then he started developing this painful area in the bottom of the right foot.  States he works at [employer] and its typically bothersome when he is at work.  Says it also bothers him if he tries to go around with out shoes or barefooted.    History of Present Illness    Pain Score: 6  Pain Location: Foot  Pain Orientation: Right     Pain Descriptors: Sharp  Pain Frequency: Intermittent  Pain Onset: Ongoing  Date Pain First Started:  (one year)  Clinical Progression: Gradually worsening  Aggravating Factors:  (walking on hard floor with no shoes)        Pain Intervention(s):  (insoles, has worn Power Steps in the past )  Result of Injury: No  Work-Related Injury: No    Review of Systems   Constitutional: Negative for diaphoresis, fever and unexpected weight change.   HENT: Negative for dental problem and sore throat.    Eyes: Negative for visual disturbance.   Respiratory: Negative for shortness of breath.    Cardiovascular: Negative for chest pain.   Gastrointestinal: Negative for abdominal pain, constipation, diarrhea, nausea and vomiting.   Genitourinary: Negative for difficulty urinating and frequency.   Musculoskeletal: Positive for arthralgias. Negative for joint swelling.   Skin: Negative for wound.   Neurological: Negative for headaches.   Hematological: Does not bruise/bleed easily.       Past Medical History:   Diagnosis Date   • Allergy    • Arthritis    • Atrial fibrillation (CMS/HCC)     DIAGNOSED IN 2003   • Cough    • ED (erectile dysfunction) of non-organic origin    • Foot pain    • History of kidney stones    • Hyperlipidemia    • Hypertension         Past Surgical History:    Procedure Laterality Date   • COLONOSCOPY N/A 8/7/2017    Procedure: COLONOSCOPY WITH BXS, POLYPECTOMY;  Surgeon: Fermín Hamilton MD;  Location: [facility] ENDOSCOPY;  Service:    • FACIAL RECONSTRUCTION SURGERY      FROM DOG BITE AS A CHILD   • KIDNEY STONE SURGERY     • KNEE SURGERY Right 2015       No Known Allergies      Current Outpatient Prescriptions:   •  aspirin 81 MG tablet, Take 81 mg by mouth Daily., Disp: , Rfl:   •  diltiaZEM CD (CARDIZEM CD) 120 MG 24 hr capsule, Take 1 capsule by mouth Every Night., Disp: 90 capsule, Rfl: 2  •  losartan (COZAAR) 50 MG tablet, Take 1 tablet by mouth Daily., Disp: 90 tablet, Rfl: 2    Family History   Problem Relation Age of Onset   • Other Other         brain tumor, renal disease   • Cancer Other    • Stroke Other    • Colon cancer Neg Hx        Social History     Social History   • Marital status:      Spouse name: N/A   • Number of children: N/A   • Years of education: N/A     Occupational History   • Not on file.     Social History Main Topics   • Smoking status: Never Smoker   • Smokeless tobacco: Never Used   • Alcohol use No   • Drug use: No   • Sexual activity: Defer     Other Topics Concern   • Not on file     Social History Narrative   • No narrative on file       Counseling given: Not Answered       I have reviewed all of the above social hx, family hx, surgical hx, medications, allergies & ROS and confirm that it is accurate.  Objective   Physical Exam   Constitutional: He is oriented to person, place, and time. He appears well-developed and well-nourished.   HENT:   Head: Normocephalic and atraumatic.   Nose: Nose normal.   Eyes: Pupils are equal, round, and reactive to light. Conjunctivae and EOM are normal.   Neck: Normal range of motion.   Pulmonary/Chest: Effort normal.   Abdominal: Soft.   Neurological: He is alert and oriented to person, place, and time.   Skin: Skin is warm.   Psychiatric: He has a normal mood and affect. His behavior is normal.  Judgment and thought content normal.   Nursing note and vitals reviewed.    Ortho Exam  Ortho Exam right  Lower extremity exam:  Vascular: Pulses palpable, pedal hair noted, CFT brisk, no edema noted  Neuro: Gross sensation intact  Derm: Normal turgor and temperature, no wounds or sores or lesions  MSK: Joint range of motion normal, manual muscle testing normal, no pain to palpation, no pain with range of motion the plantar second metatarsophalangeal joint is slightly prominent but minimally tender.  Negative Lachman's test.  No MTPJ and stability.      Assessment/Plan potentially hypertrophic plantar second metatarsal head versus plantarflexed or elongated second metatarsal.  Independent Review of Radiographic Studies:    No x-rays were taken due to his schedule chief complaint being plantar wart.  Laboratory and Other Studies:     Medical Decision Making:        Procedures  [] No procedures were performed in office today.    Benjie was seen today for pain and consult.    Diagnoses and all orders for this visit:    Metatarsalgia of right foot          Recommendations/Plan:  I explained to him this is most likely something bony and structural and I would need x-rays to further evaluate but I think that a metatarsal pad or an insert with a built-in metatarsal pad may help him and alleviate this pressure.  I recommend he try these and take over-the-counter anti-inflammatories.  I explained to him where to put the metatarsal pad and explained that if it is placed too far forward this would actually exacerbate the pain.  I recommend he find a pair of these where he can and see how this does for 3-4 weeks.  If it has not resolved and come back and I'll further evaluate this and we will x-ray and look into this deeper.    Return in about 4 weeks (around 9/18/2018).  Patient agreeable to call or return sooner for any concerns.

## 2018-08-28 ENCOUNTER — HOSPITAL ENCOUNTER (OUTPATIENT)
Dept: MRI IMAGING | Facility: HOSPITAL | Age: 53
Discharge: HOME | End: 2018-08-28
Attending: ORTHOPAEDIC SURGERY | Admitting: ORTHOPAEDIC SURGERY

## 2018-08-28 PROCEDURE — 73721 MRI JNT OF LWR EXTRE W/O DYE: CPT

## 2018-09-19 ENCOUNTER — PREP FOR SURGERY (OUTPATIENT)
Dept: OTHER | Facility: HOSPITAL | Age: 53
End: 2018-09-19

## 2018-09-19 ENCOUNTER — OFFICE VISIT (OUTPATIENT)
Dept: ORTHOPEDIC SURGERY | Facility: CLINIC | Age: 53
End: 2018-09-19

## 2018-09-19 VITALS — RESPIRATION RATE: 18 BRPM | WEIGHT: 235 LBS | BODY MASS INDEX: 32.9 KG/M2 | HEIGHT: 71 IN

## 2018-09-19 DIAGNOSIS — M17.12 PRIMARY OSTEOARTHRITIS OF LEFT KNEE: ICD-10-CM

## 2018-09-19 DIAGNOSIS — M22.42 CHONDROMALACIA, PATELLA, LEFT: ICD-10-CM

## 2018-09-19 DIAGNOSIS — M25.562 ARTHRALGIA OF LEFT KNEE: Primary | ICD-10-CM

## 2018-09-19 DIAGNOSIS — S83.412A SPRAIN OF MEDIAL COLLATERAL LIGAMENT OF LEFT KNEE, INITIAL ENCOUNTER: ICD-10-CM

## 2018-09-19 DIAGNOSIS — M23.42 LOOSE BODY IN KNEE, LEFT KNEE: Primary | ICD-10-CM

## 2018-09-19 DIAGNOSIS — M23.42 LOOSE BODY OF LEFT KNEE: ICD-10-CM

## 2018-09-19 DIAGNOSIS — S83.8X2A MENISCAL INJURY, LEFT, INITIAL ENCOUNTER: ICD-10-CM

## 2018-09-19 PROCEDURE — 99213 OFFICE O/P EST LOW 20 MIN: CPT | Performed by: PHYSICIAN ASSISTANT

## 2018-09-19 NOTE — PROGRESS NOTES
Subjective   Patient ID: Benjie Mcgovern is a 53 y.o. right hand dominant male  Follow-up and Pain of the Left Knee (Patient is here today for his left knee MRI results, he denies any changes to his knee since last appointment.)         History of Present Illness  Patient is here to review MRI results of the left knee.  He initially developed left knee pain on 7/14/2018 that occurred while unloading brush the back of a truck.  He has since been seen by Dr. Garcia and had an MRI ordered.  He states he is still having discomfort to the left knee.  He states the pain is worse at night trying to sleep.  He denies numbness or tingling.  Denies redness or warmth to the knee.  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 to get into the tract seemed to intensify his discomfort                                                 Past Medical History:   Diagnosis Date   • Allergy    • Arthritis    • Atrial fibrillation (CMS/HCC)     DIAGNOSED IN 2003   • Cough    • ED (erectile dysfunction) of non-organic origin    • Foot pain    • History of kidney stones    • Hyperlipidemia    • Hypertension         Past Surgical History:   Procedure Laterality Date   • COLONOSCOPY N/A 8/7/2017    Procedure: COLONOSCOPY WITH BXS, POLYPECTOMY;  Surgeon: Fermín Hamilton MD;  Location: [facility];  Service:    • FACIAL RECONSTRUCTION SURGERY      FROM DOG BITE AS A CHILD   • KIDNEY STONE SURGERY     • KNEE SURGERY Right 2015       Family History   Problem Relation Age of Onset   • Other Other         brain tumor, renal disease   • Cancer Other    • Stroke Other    • Colon cancer Neg Hx        Social History     Social History   • Marital status:      Spouse name: N/A   • Number of children: N/A   • Years of education: N/A     Occupational History   • Not on file.     Social History Main Topics   • Smoking status: Never Smoker   • Smokeless tobacco:  "Never Used   • Alcohol use No   • Drug use: No   • Sexual activity: Defer     Other Topics Concern   • Not on file     Social History Narrative   • No narrative on file         Current Outpatient Prescriptions:   •  aspirin 81 MG tablet, Take 81 mg by mouth Daily., Disp: , Rfl:   •  diltiaZEM CD (CARDIZEM CD) 120 MG 24 hr capsule, Take 1 capsule by mouth Every Night., Disp: 90 capsule, Rfl: 2  •  losartan (COZAAR) 50 MG tablet, Take 1 tablet by mouth Daily., Disp: 90 tablet, Rfl: 2    No Known Allergies    Review of Systems   Constitutional: Negative for fever.   HENT: Negative for voice change.    Eyes: Negative for visual disturbance.   Respiratory: Negative for shortness of breath.    Cardiovascular: Negative for chest pain.   Gastrointestinal: Negative for abdominal distention and abdominal pain.   Genitourinary: Negative for dysuria.   Musculoskeletal: Positive for arthralgias. Negative for gait problem and joint swelling.   Skin: Negative for rash.   Neurological: Negative for speech difficulty.   Hematological: Does not bruise/bleed easily.   Psychiatric/Behavioral: Negative for confusion.       Objective   Resp 18   Ht 180.3 cm (71\")   Wt 107 kg (235 lb)   BMI 32.78 kg/m²    Physical Exam   Constitutional: He is oriented to person, place, and time. He appears well-nourished.   Eyes: Conjunctivae are normal.   Neck: No tracheal deviation present.   Cardiovascular: Normal rate.    Pulmonary/Chest: No respiratory distress.   Abdominal: He exhibits no distension.   Musculoskeletal:        Left knee: He exhibits bony tenderness and abnormal meniscus. He exhibits no swelling, no effusion, no ecchymosis, no deformity, no erythema and normal alignment. Tenderness found. Medial joint line tenderness noted. No MCL tenderness noted.   Neurological: He is alert and oriented to person, place, and time.   Skin: Capillary refill takes less than 2 seconds. No rash noted.   Psychiatric: He has a normal mood and affect. "   Vitals reviewed.    Left Knee Exam     Range of Motion   Extension: 5   Left knee flexion: 115.     Tests   Jose:  Medial - positive   Lachman:  Anterior - negative      Patellar Apprehension: trace    Other   Erythema: absent  Effusion: no effusion present           Extremity DVT signs are Negative on physical exam with negative Rebekah sign, with no calf pain, with no palpable cords, with no increased pain with passive stretch/extension and with no skin tone change   Neurologic Exam     Mental Status   Oriented to person, place, and time.      Left Knee Exam     Tenderness   The patient is experiencing tenderness in the medial joint line, no MCL.               Assessment/Plan   Independent Review of Radiographic Studies:    No new imaging done today.  I did review MRI results the patient the office.  There is a small partial medial meniscal tear, mild grade 1 MCL sprain, loose body noted within the joint space as well as bone marrow edema to the medial femoral condyle.  There is also significant chondromalacia patella    Procedures       Benjie was seen today for follow-up and pain.    Diagnoses and all orders for this visit:    Arthralgia of left knee    Primary osteoarthritis of left knee    Chondromalacia, patella, left    Loose body of left knee    Meniscal injury, left, initial encounter    Sprain of medial collateral ligament of left knee, initial encounter       Orthopedic activities reviewed and patient expressed appreciation  Discussion of orthopedic goals  Risk, benefits, and merits of treatment alternatives reviewed with the patient and questions answered  The nature of the proposed surgery reviewed with the patient including risks, benefits, rehabilitation, recovery timeframe, and outcome expectations  Ice, heat, and/or modalities as beneficial    Recommendations/Plan:  Surgery: Surgery proposed at this visit as noted., If symptoms and functional limitations persist with current treatment, patient to  consider elective surgery. and For intractable painful limiting condition, patient to consider elective surgical options.  Patient is encouraged to call or return for any issues or concerns.  We also discussed nonsurgical management which includes physical therapy, knee bracing and a cortisone injection but he states he would like to have the knee surgery in order to heal quicker.      PLAN: Left knee ATS with PMM, removal of loose bodies, subchondroplasty and related procedures    Patient agreeable to call or return sooner for any concerns.

## 2018-09-20 ENCOUNTER — APPOINTMENT (OUTPATIENT)
Dept: PREADMISSION TESTING | Facility: HOSPITAL | Age: 53
End: 2018-09-20

## 2018-09-20 ENCOUNTER — HOSPITAL ENCOUNTER (OUTPATIENT)
Dept: GENERAL RADIOLOGY | Facility: HOSPITAL | Age: 53
Discharge: HOME | End: 2018-09-20
Admitting: PHYSICIAN ASSISTANT

## 2018-09-20 VITALS
OXYGEN SATURATION: 98 % | HEIGHT: 72 IN | HEART RATE: 69 BPM | BODY MASS INDEX: 31.83 KG/M2 | SYSTOLIC BLOOD PRESSURE: 142 MMHG | DIASTOLIC BLOOD PRESSURE: 69 MMHG | WEIGHT: 235 LBS

## 2018-09-20 DIAGNOSIS — M23.42 LOOSE BODY IN KNEE, LEFT KNEE: ICD-10-CM

## 2018-09-20 LAB
ALBUMIN SERPL-MCNC: 4.7 G/DL (ref 3.5–5)
ALBUMIN/GLOB SERPL: 1.6 G/DL (ref 1–2)
ALP SERPL-CCNC: 120 U/L (ref 38–126)
ALT SERPL W P-5'-P-CCNC: 61 U/L (ref 13–69)
ANION GAP SERPL CALCULATED.3IONS-SCNC: 15.2 MMOL/L (ref 10–20)
AST SERPL-CCNC: 47 U/L (ref 15–46)
BASOPHILS # BLD AUTO: 0.06 10*3/MM3 (ref 0–0.2)
BASOPHILS NFR BLD AUTO: 0.7 % (ref 0–2.5)
BILIRUB SERPL-MCNC: 0.6 MG/DL (ref 0.2–1.3)
BUN BLD-MCNC: 17 MG/DL (ref 7–20)
BUN/CREAT SERPL: 21.3 (ref 6.3–21.9)
CALCIUM SPEC-SCNC: 9.3 MG/DL (ref 8.4–10.2)
CHLORIDE SERPL-SCNC: 107 MMOL/L (ref 98–107)
CO2 SERPL-SCNC: 24 MMOL/L (ref 26–30)
CREAT BLD-MCNC: 0.8 MG/DL (ref 0.6–1.3)
DEPRECATED RDW RBC AUTO: 41 FL (ref 37–54)
EOSINOPHIL # BLD AUTO: 0.12 10*3/MM3 (ref 0–0.7)
EOSINOPHIL NFR BLD AUTO: 1.5 % (ref 0–7)
ERYTHROCYTE [DISTWIDTH] IN BLOOD BY AUTOMATED COUNT: 12.6 % (ref 11.5–14.5)
GFR SERPL CREATININE-BSD FRML MDRD: 102 ML/MIN/1.73
GLOBULIN UR ELPH-MCNC: 3 GM/DL
GLUCOSE BLD-MCNC: 101 MG/DL (ref 74–98)
HCT VFR BLD AUTO: 48 % (ref 42–52)
HGB BLD-MCNC: 16.4 G/DL (ref 14–18)
IMM GRANULOCYTES # BLD: 0.01 10*3/MM3 (ref 0–0.06)
IMM GRANULOCYTES NFR BLD: 0.1 % (ref 0–0.6)
LYMPHOCYTES # BLD AUTO: 2.86 10*3/MM3 (ref 0.6–3.4)
LYMPHOCYTES NFR BLD AUTO: 34.8 % (ref 10–50)
MCH RBC QN AUTO: 30.6 PG (ref 27–31)
MCHC RBC AUTO-ENTMCNC: 34.2 G/DL (ref 30–37)
MCV RBC AUTO: 89.6 FL (ref 80–94)
MONOCYTES # BLD AUTO: 0.74 10*3/MM3 (ref 0–0.9)
MONOCYTES NFR BLD AUTO: 9 % (ref 0–12)
NEUTROPHILS # BLD AUTO: 4.44 10*3/MM3 (ref 2–6.9)
NEUTROPHILS NFR BLD AUTO: 53.9 % (ref 37–80)
NRBC BLD MANUAL-RTO: 0 /100 WBC (ref 0–0)
PLATELET # BLD AUTO: 242 10*3/MM3 (ref 130–400)
PMV BLD AUTO: 10.9 FL (ref 6–12)
POTASSIUM BLD-SCNC: 4.2 MMOL/L (ref 3.5–5.1)
PROT SERPL-MCNC: 7.7 G/DL (ref 6.3–8.2)
RBC # BLD AUTO: 5.36 10*6/MM3 (ref 4.7–6.1)
SODIUM BLD-SCNC: 142 MMOL/L (ref 137–145)
WBC NRBC COR # BLD: 8.23 10*3/MM3 (ref 4.8–10.8)

## 2018-09-20 PROCEDURE — 85025 COMPLETE CBC W/AUTO DIFF WBC: CPT | Performed by: PHYSICIAN ASSISTANT

## 2018-09-20 PROCEDURE — 87081 CULTURE SCREEN ONLY: CPT | Performed by: PHYSICIAN ASSISTANT

## 2018-09-20 PROCEDURE — 36415 COLL VENOUS BLD VENIPUNCTURE: CPT

## 2018-09-20 PROCEDURE — 71046 X-RAY EXAM CHEST 2 VIEWS: CPT

## 2018-09-20 PROCEDURE — 80053 COMPREHEN METABOLIC PANEL: CPT | Performed by: PHYSICIAN ASSISTANT

## 2018-09-20 PROCEDURE — 93005 ELECTROCARDIOGRAM TRACING: CPT | Performed by: PHYSICIAN ASSISTANT

## 2018-09-22 LAB — MRSA SPEC QL CULT: NORMAL

## 2018-09-27 ENCOUNTER — ANESTHESIA (OUTPATIENT)
Dept: PERIOP | Facility: HOSPITAL | Age: 53
End: 2018-09-27

## 2018-09-27 ENCOUNTER — HOSPITAL ENCOUNTER (OUTPATIENT)
Facility: HOSPITAL | Age: 53
Setting detail: HOSPITAL OUTPATIENT SURGERY
Discharge: HOME | End: 2018-09-27
Attending: ORTHOPAEDIC SURGERY | Admitting: ORTHOPAEDIC SURGERY

## 2018-09-27 ENCOUNTER — ANESTHESIA EVENT (OUTPATIENT)
Dept: PERIOP | Facility: HOSPITAL | Age: 53
End: 2018-09-27

## 2018-09-27 VITALS
DIASTOLIC BLOOD PRESSURE: 82 MMHG | OXYGEN SATURATION: 96 % | TEMPERATURE: 97 F | SYSTOLIC BLOOD PRESSURE: 119 MMHG | RESPIRATION RATE: 18 BRPM | HEART RATE: 86 BPM

## 2018-09-27 PROCEDURE — 25010000002 ROPIVACAINE 0.75 % SOLUTION: Performed by: ORTHOPAEDIC SURGERY

## 2018-09-27 PROCEDURE — 25010000002 ONDANSETRON PER 1 MG: Performed by: NURSE ANESTHETIST, CERTIFIED REGISTERED

## 2018-09-27 PROCEDURE — 25010000002 PROPOFOL 200 MG/20ML EMULSION: Performed by: NURSE ANESTHETIST, CERTIFIED REGISTERED

## 2018-09-27 PROCEDURE — 29881 ARTHRS KNE SRG MNISECTMY M/L: CPT | Performed by: ORTHOPAEDIC SURGERY

## 2018-09-27 PROCEDURE — 25010000002 METOCLOPRAMIDE PER 10 MG: Performed by: NURSE ANESTHETIST, CERTIFIED REGISTERED

## 2018-09-27 PROCEDURE — 25010000003 CEFAZOLIN PER 500 MG: Performed by: PHYSICIAN ASSISTANT

## 2018-09-27 PROCEDURE — 25010000002 KETOROLAC TROMETHAMINE PER 15 MG: Performed by: NURSE ANESTHETIST, CERTIFIED REGISTERED

## 2018-09-27 PROCEDURE — 25010000002 DEXAMETHASONE PER 1 MG: Performed by: NURSE ANESTHETIST, CERTIFIED REGISTERED

## 2018-09-27 RX ORDER — DEXAMETHASONE SODIUM PHOSPHATE 4 MG/ML
INJECTION, SOLUTION INTRA-ARTICULAR; INTRALESIONAL; INTRAMUSCULAR; INTRAVENOUS; SOFT TISSUE AS NEEDED
Status: DISCONTINUED | OUTPATIENT
Start: 2018-09-27 | End: 2018-09-27 | Stop reason: SURG

## 2018-09-27 RX ORDER — ONDANSETRON 2 MG/ML
INJECTION INTRAMUSCULAR; INTRAVENOUS AS NEEDED
Status: DISCONTINUED | OUTPATIENT
Start: 2018-09-27 | End: 2018-09-27 | Stop reason: SURG

## 2018-09-27 RX ORDER — KETOROLAC TROMETHAMINE 30 MG/ML
INJECTION, SOLUTION INTRAMUSCULAR; INTRAVENOUS AS NEEDED
Status: DISCONTINUED | OUTPATIENT
Start: 2018-09-27 | End: 2018-09-27 | Stop reason: SURG

## 2018-09-27 RX ORDER — LIDOCAINE HYDROCHLORIDE 10 MG/ML
INJECTION, SOLUTION INFILTRATION; PERINEURAL AS NEEDED
Status: DISCONTINUED | OUTPATIENT
Start: 2018-09-27 | End: 2018-09-27 | Stop reason: HOSPADM

## 2018-09-27 RX ORDER — LIDOCAINE HYDROCHLORIDE 20 MG/ML
INJECTION, SOLUTION INFILTRATION; PERINEURAL AS NEEDED
Status: DISCONTINUED | OUTPATIENT
Start: 2018-09-27 | End: 2018-09-27 | Stop reason: SURG

## 2018-09-27 RX ORDER — ONDANSETRON 2 MG/ML
4 INJECTION INTRAMUSCULAR; INTRAVENOUS ONCE AS NEEDED
Status: DISCONTINUED | OUTPATIENT
Start: 2018-09-27 | End: 2018-09-27 | Stop reason: HOSPADM

## 2018-09-27 RX ORDER — ACETAMINOPHEN 500 MG
1000 TABLET ORAL ONCE
Status: COMPLETED | OUTPATIENT
Start: 2018-09-27 | End: 2018-09-27

## 2018-09-27 RX ORDER — MORPHINE SULFATE 2 MG/ML
2 INJECTION, SOLUTION INTRAMUSCULAR; INTRAVENOUS
Status: DISCONTINUED | OUTPATIENT
Start: 2018-09-27 | End: 2018-09-27 | Stop reason: HOSPADM

## 2018-09-27 RX ORDER — SODIUM CHLORIDE, SODIUM LACTATE, POTASSIUM CHLORIDE, CALCIUM CHLORIDE 600; 310; 30; 20 MG/100ML; MG/100ML; MG/100ML; MG/100ML
1000 INJECTION, SOLUTION INTRAVENOUS CONTINUOUS
Status: DISCONTINUED | OUTPATIENT
Start: 2018-09-27 | End: 2018-09-27 | Stop reason: HOSPADM

## 2018-09-27 RX ORDER — SODIUM CHLORIDE 0.9 % (FLUSH) 0.9 %
3 SYRINGE (ML) INJECTION AS NEEDED
Status: DISCONTINUED | OUTPATIENT
Start: 2018-09-27 | End: 2018-09-27 | Stop reason: HOSPADM

## 2018-09-27 RX ORDER — MEPERIDINE HYDROCHLORIDE 50 MG/ML
25 INJECTION INTRAMUSCULAR; INTRAVENOUS; SUBCUTANEOUS
Status: DISCONTINUED | OUTPATIENT
Start: 2018-09-27 | End: 2018-09-27 | Stop reason: HOSPADM

## 2018-09-27 RX ORDER — HYDROCODONE BITARTRATE AND ACETAMINOPHEN 7.5; 325 MG/1; MG/1
1 TABLET ORAL EVERY 6 HOURS PRN
Qty: 24 TABLET | Refills: 0 | Status: SHIPPED | OUTPATIENT
Start: 2018-09-27 | End: 2019-01-10

## 2018-09-27 RX ORDER — PROPOFOL 10 MG/ML
INJECTION, EMULSION INTRAVENOUS AS NEEDED
Status: DISCONTINUED | OUTPATIENT
Start: 2018-09-27 | End: 2018-09-27 | Stop reason: SURG

## 2018-09-27 RX ORDER — METOCLOPRAMIDE HYDROCHLORIDE 5 MG/ML
INJECTION INTRAMUSCULAR; INTRAVENOUS AS NEEDED
Status: DISCONTINUED | OUTPATIENT
Start: 2018-09-27 | End: 2018-09-27 | Stop reason: SURG

## 2018-09-27 RX ORDER — ROPIVACAINE HYDROCHLORIDE 7.5 MG/ML
INJECTION, SOLUTION EPIDURAL; PERINEURAL AS NEEDED
Status: DISCONTINUED | OUTPATIENT
Start: 2018-09-27 | End: 2018-09-27 | Stop reason: HOSPADM

## 2018-09-27 RX ADMIN — DEXAMETHASONE SODIUM PHOSPHATE 10 MG: 4 INJECTION, SOLUTION INTRAMUSCULAR; INTRAVENOUS at 10:49

## 2018-09-27 RX ADMIN — KETOROLAC TROMETHAMINE 30 MG: 30 INJECTION, SOLUTION INTRAMUSCULAR at 10:49

## 2018-09-27 RX ADMIN — METOCLOPRAMIDE 10 MG: 5 INJECTION, SOLUTION INTRAMUSCULAR; INTRAVENOUS at 10:49

## 2018-09-27 RX ADMIN — CEFAZOLIN SODIUM 2 G: 2 SOLUTION INTRAVENOUS at 10:46

## 2018-09-27 RX ADMIN — SODIUM CHLORIDE, POTASSIUM CHLORIDE, SODIUM LACTATE AND CALCIUM CHLORIDE 1000 ML: 600; 310; 30; 20 INJECTION, SOLUTION INTRAVENOUS at 07:55

## 2018-09-27 RX ADMIN — ACETAMINOPHEN 1000 MG: 500 TABLET, FILM COATED ORAL at 08:04

## 2018-09-27 RX ADMIN — PROPOFOL 200 MG: 10 INJECTION, EMULSION INTRAVENOUS at 10:49

## 2018-09-27 RX ADMIN — ONDANSETRON 4 MG: 2 INJECTION INTRAMUSCULAR; INTRAVENOUS at 10:49

## 2018-09-27 RX ADMIN — LIDOCAINE HYDROCHLORIDE 100 MG: 20 INJECTION, SOLUTION INFILTRATION; PERINEURAL at 10:49

## 2018-09-27 NOTE — ANESTHESIA PREPROCEDURE EVALUATION
Anesthesia Evaluation     Patient summary reviewed and Nursing notes reviewed   NPO Solid Status: > 8 hours  NPO Liquid Status: > 8 hours           Airway   Mallampati: II  TM distance: >3 FB  Neck ROM: full  No difficulty expected  Dental - normal exam     Pulmonary - negative pulmonary ROS and normal exam   Cardiovascular - normal exam  Exercise tolerance: good (4-7 METS)    ECG reviewed    (+) hypertension, dysrhythmias Atrial Fib, hyperlipidemia,       Neuro/Psych- negative ROS  GI/Hepatic/Renal/Endo - negative ROS     Musculoskeletal     Abdominal  - normal exam    Bowel sounds: normal.   Substance History - negative use     OB/GYN negative ob/gyn ROS         Other   (+) arthritis                   Anesthesia Plan    ASA 2     general   (Postop adductor canal block    PO 1 gm tylenol)  intravenous induction   Anesthetic plan, all risks, benefits, and alternatives have been provided, discussed and informed consent has been obtained with: patient.

## 2018-09-27 NOTE — ANESTHESIA POSTPROCEDURE EVALUATION
Patient: Benjie Mcgovern    Procedure Summary     Date:  09/27/18 Room / Location:  [facility] OR  /  [facility] OR    Anesthesia Start:  1046 Anesthesia Stop:  1155    Procedure:  Knee diagnostic arthroscopy, left with partial medial menisectomy, removal of loose bodies and two compartment chondroplasty (Left Knee) Diagnosis:       Loose body in knee, left knee      (Loose body in knee, left knee [M23.42])    Surgeon:  Dean Garcia MD Provider:  Benjie Alexander CRNA    Anesthesia Type:  general ASA Status:  2          Anesthesia Type: general  Last vitals  BP   110/80 (09/27/18 1305)   Temp   97 °F (36.1 °C) (09/27/18 1305)   Pulse   60 (09/27/18 1305)   Resp   16 (09/27/18 1305)     SpO2   93 % (09/27/18 1305)     Post Anesthesia Care and Evaluation    Patient location during evaluation: PACU  Patient participation: complete - patient participated  Level of consciousness: awake  Pain score: 3  Pain management: adequate  Airway patency: patent  Anesthetic complications: No anesthetic complications  PONV Status: controlled  Cardiovascular status: acceptable and stable  Respiratory status: acceptable and face mask  Hydration status: acceptable

## 2018-09-27 NOTE — ANESTHESIA PROCEDURE NOTES
Airway  Urgency: elective    Airway not difficult    General Information and Staff    Patient location during procedure: OR  CRNA: KIANNA REARDON    Indications and Patient Condition  Indications for airway management: airway protection    Preoxygenated: yes  Mask difficulty assessment: 1 - vent by mask    Final Airway Details  Final airway type: supraglottic airway      Successful airway: unique  Size 4    Number of attempts at approach: 1

## 2018-10-09 ENCOUNTER — OFFICE VISIT (OUTPATIENT)
Dept: ORTHOPEDIC SURGERY | Facility: CLINIC | Age: 53
End: 2018-10-09

## 2018-10-09 VITALS — RESPIRATION RATE: 18 BRPM | HEIGHT: 72 IN | WEIGHT: 236 LBS | BODY MASS INDEX: 31.97 KG/M2

## 2018-10-09 DIAGNOSIS — Z98.890 S/P LEFT KNEE ARTHROSCOPY: Primary | ICD-10-CM

## 2018-10-09 PROCEDURE — 99024 POSTOP FOLLOW-UP VISIT: CPT | Performed by: PHYSICIAN ASSISTANT

## 2018-10-09 NOTE — PROGRESS NOTES
"Subjective   Patient ID: Benjie Mcgovern is a 53 y.o.  male is here today for a post-operative visit. Will need work note today.      CHIEF COMPLAINT:        Post-op of the Left Foot (Pt here today for post op of knee diagnostic arthroscopy, left with partial medial menisectomy, removal of loose bodies and two compartment chondroplasty done 9/27/18.)        History of Present Illness      Pain controlled: [] no   [x] yes   Medication refill requested: [x] no   [] yes    Patient compliant with instructions: [] no   [x] yes   Other: Reports good progress since surgery.     Past Medical History:   Diagnosis Date   • Allergy    • Arthritis    • Atrial fibrillation (CMS/HCC) 2003    REPORTS WAS DIAGNOSED IN 2003 AND THAT HE HAS HAD NO EPISODES SINCE THAT TIME   • ED (erectile dysfunction) of non-organic origin    • Elevated cholesterol     REPORTS WAS TOLD \"BORDERLINE\" BUT THAT HE HAS NEVER TAKEN MEDICATION   • Foot pain    • History of exercise stress test 2003    REPORTS WAS TOLD THAT ALL WAS WNL'S   • History of fracture     HISTORY OF LEFT WRIST AS A CHILD - HAD SURGICAL INTERVENTION   • History of kidney stones     REPORTS MULTIPLE EPISODES AND THAT HE HAS REQUIRED SURGICAL INTERVENTION IN THE PAST   • Hyperlipidemia    • Hypertension    • Wears contact lenses     INSTRUCTED TO LEAVE CONTACTS OUT THE DOS        Past Surgical History:   Procedure Laterality Date   • COLONOSCOPY N/A 8/7/2017    Procedure: COLONOSCOPY WITH BXS, POLYPECTOMY;  Surgeon: Fermín Hamilton MD;  Location: [facility] ENDOSCOPY;  Service:    • FACIAL RECONSTRUCTION SURGERY      FROM DOG BITE AS A CHILD   • FRACTURE SURGERY Left     WRIST AS  A CHILD   • KIDNEY STONE SURGERY     • KNEE ARTHROSCOPY Left 9/27/2018    Procedure: Knee diagnostic arthroscopy, left with partial medial menisectomy, removal of loose bodies and two compartment chondroplasty;  Surgeon: Dean Garcia MD;  Location: [facility] OR;  Service: Orthopedics   • KNEE SURGERY Right " "2015       No Known Allergies    Review of Systems   Constitutional: Negative for fever.   HENT: Negative for voice change.    Eyes: Negative for visual disturbance.   Respiratory: Negative for shortness of breath.    Cardiovascular: Negative for chest pain.   Gastrointestinal: Negative for abdominal distention and abdominal pain.   Genitourinary: Negative for dysuria.   Musculoskeletal: Positive for arthralgias. Negative for gait problem and joint swelling.   Skin: Negative for rash.   Neurological: Negative for speech difficulty.   Hematological: Does not bruise/bleed easily.   Psychiatric/Behavioral: Negative for confusion.       Objective   Resp 18   Ht 182.9 cm (72\")   Wt 107 kg (236 lb)   BMI 32.01 kg/m²       Signs of infection: [x] no                    [] yes   Drainage: [x] no                    [] yes   Incision: [x] healing well     []healed well   Motor exam intact: [] no                    [x] yes   Neurovascular exam intact: [] no                    [x] yes   Signs of compartment syndrome: [x] no                    [] yes   Signs of DVT: [x] no                    [] yes   Other:      Physical Exam   Constitutional: He is oriented to person, place, and time. He appears well-nourished.   Neck: No tracheal deviation present.   Pulmonary/Chest: No respiratory distress.   Musculoskeletal:        Left knee: He exhibits swelling. He exhibits normal range of motion (minimal), no effusion, no ecchymosis, no erythema and normal alignment. No tenderness found.   Neurological: He is alert and oriented to person, place, and time.   Skin: Capillary refill takes less than 2 seconds.   Psychiatric: He has a normal mood and affect. His behavior is normal.   Vitals reviewed.    Left Knee Exam     Range of Motion   Left knee extension: 3.   Flexion: 120     Tests   Drawer:       Anterior - negative     Posterior - negative    Other   Erythema: absent  Scars: present  Sensation: normal  Effusion: no effusion " present            Extremity DVT signs are Negative on physical exam with negative Rebekah sign, with no calf pain, with no palpable cords, with no increased pain with passive stretch/extension and with no skin tone change  Neurologic Exam     Mental Status   Oriented to person, place, and time.     Ortho Exam    Assessment/Plan   Independent Review of Radiographic Studies:    No new imaging done today.  Laboratory and Other Studies:  No new results reviewed today.        Procedures     Benjie was seen today for post-op.    Diagnoses and all orders for this visit:    S/P left knee arthroscopy         Recommendations/Plan:     Sutures Staples or Pins [x] Removed today  [] At prior visit  [] Plan removal later   Physical therapy: []rehab facility  []outpatient referral  [] therapy ongoing   PT not ordered      Ultrasound: [x]not ordered         []order given to patient   Labs: [x]not ordered         []order given to patient   Weight Bearing status: []Full [x]WBAT []PWB []NWB []Other     Discussion of orthopaedic goals and activities and patient and/or guardian expressed appreciation.  Risk, benefits, and merits of treatment alternatives reviewed with the patient and/or guardian and questions answered  Guided on proper techniques for mobility, strength, agility and/or conditioning exercises           Patient is encouraged to call or return for any issues or concerns.    FU PRN    Patient agreeable to call or return sooner for any concerns.

## 2018-11-27 DIAGNOSIS — M25.562 ARTHRALGIA OF LEFT KNEE: Primary | ICD-10-CM

## 2018-11-28 ENCOUNTER — OFFICE VISIT (OUTPATIENT)
Dept: ORTHOPEDIC SURGERY | Facility: CLINIC | Age: 53
End: 2018-11-28

## 2018-11-28 VITALS — HEIGHT: 72 IN | BODY MASS INDEX: 32.78 KG/M2 | RESPIRATION RATE: 18 BRPM | WEIGHT: 242 LBS

## 2018-11-28 DIAGNOSIS — M25.562 ACUTE PAIN OF LEFT KNEE: ICD-10-CM

## 2018-11-28 DIAGNOSIS — S89.92XA KNEE INJURY, LEFT, INITIAL ENCOUNTER: ICD-10-CM

## 2018-11-28 DIAGNOSIS — S83.207A POSITIVE MCMURRAY SIGN (MENISCUS TEAR) OF LEFT KNEE, INITIAL ENCOUNTER: Primary | ICD-10-CM

## 2018-11-28 PROCEDURE — 99213 OFFICE O/P EST LOW 20 MIN: CPT | Performed by: PHYSICIAN ASSISTANT

## 2018-11-28 PROCEDURE — 20610 DRAIN/INJ JOINT/BURSA W/O US: CPT | Performed by: PHYSICIAN ASSISTANT

## 2018-11-28 RX ORDER — METHYLPREDNISOLONE ACETATE 40 MG/ML
40 INJECTION, SUSPENSION INTRA-ARTICULAR; INTRALESIONAL; INTRAMUSCULAR; SOFT TISSUE
Status: COMPLETED | OUTPATIENT
Start: 2018-11-28 | End: 2018-11-28

## 2018-11-28 RX ORDER — LIDOCAINE HYDROCHLORIDE 10 MG/ML
2 INJECTION, SOLUTION EPIDURAL; INFILTRATION; INTRACAUDAL; PERINEURAL
Status: COMPLETED | OUTPATIENT
Start: 2018-11-28 | End: 2018-11-28

## 2018-11-28 RX ADMIN — LIDOCAINE HYDROCHLORIDE 2 ML: 10 INJECTION, SOLUTION EPIDURAL; INFILTRATION; INTRACAUDAL; PERINEURAL at 14:05

## 2018-11-28 RX ADMIN — METHYLPREDNISOLONE ACETATE 40 MG: 40 INJECTION, SUSPENSION INTRA-ARTICULAR; INTRALESIONAL; INTRAMUSCULAR; SOFT TISSUE at 14:05

## 2018-11-28 NOTE — PROGRESS NOTES
"Subjective   Patient ID: Benjie Mcgovern is a 53 y.o. right hand dominant male  Pain of the Left Knee (Patient states on 11/20/18 he was unloading boxes at work when he got a bad pain in his knee. He states the next day on Wednesday he was walking on a incline when the pain almost brought him to the ground. His pain is 5/10.)         History of Present Illness  Patient complains of pain very similar to recent meniscal tear. He states the knee feels like it wants to lock up.   Denies redness or warmth to the knee.,  He has tried OTC meds and rest with no improvement.  Patient did have a left knee arthroscopy 9/27/2018.  He had a partial medial meniscectomy and chondroplasty.  He states he is doing great after the surgery up until 11/20/2018 when he reinjured his left knee.                                                 Past Medical History:   Diagnosis Date   • Allergy    • Arthritis    • Atrial fibrillation (CMS/HCC) 2003    REPORTS WAS DIAGNOSED IN 2003 AND THAT HE HAS HAD NO EPISODES SINCE THAT TIME   • ED (erectile dysfunction) of non-organic origin    • Elevated cholesterol     REPORTS WAS TOLD \"BORDERLINE\" BUT THAT HE HAS NEVER TAKEN MEDICATION   • Foot pain    • History of exercise stress test 2003    REPORTS WAS TOLD THAT ALL WAS WNL'S   • History of fracture     HISTORY OF LEFT WRIST AS A CHILD - HAD SURGICAL INTERVENTION   • History of kidney stones     REPORTS MULTIPLE EPISODES AND THAT HE HAS REQUIRED SURGICAL INTERVENTION IN THE PAST   • Hyperlipidemia    • Hypertension    • Wears contact lenses     INSTRUCTED TO LEAVE CONTACTS OUT THE DOS        Past Surgical History:   Procedure Laterality Date   • FACIAL RECONSTRUCTION SURGERY      FROM DOG BITE AS A CHILD   • FRACTURE SURGERY Left     WRIST AS  A CHILD   • KIDNEY STONE SURGERY     • KNEE SURGERY Right 2015       Family History   Problem Relation Age of Onset   • Other Other         brain tumor, renal disease   • Cancer Other    • Stroke Other    • " Colon cancer Neg Hx        Social History     Socioeconomic History   • Marital status:      Spouse name: Not on file   • Number of children: Not on file   • Years of education: Not on file   • Highest education level: Not on file   Social Needs   • Financial resource strain: Not on file   • Food insecurity - worry: Not on file   • Food insecurity - inability: Not on file   • Transportation needs - medical: Not on file   • Transportation needs - non-medical: Not on file   Occupational History   • Not on file   Tobacco Use   • Smoking status: Never Smoker   • Smokeless tobacco: Never Used   Substance and Sexual Activity   • Alcohol use: No   • Drug use: No   • Sexual activity: Defer   Other Topics Concern   • Not on file   Social History Narrative   • Not on file         Current Outpatient Medications:   •  aspirin 81 MG tablet, Take 81 mg by mouth Daily., Disp: , Rfl:   •  diltiaZEM CD (CARDIZEM CD) 120 MG 24 hr capsule, Take 1 capsule by mouth Every Night., Disp: 90 capsule, Rfl: 2  •  HYDROcodone-acetaminophen (NORCO) 7.5-325 MG per tablet, Take 1 tablet by mouth Every 6 (Six) Hours As Needed for pain, Disp: 24 tablet, Rfl: 0  •  losartan (COZAAR) 50 MG tablet, Take 1 tablet by mouth Daily. (Patient taking differently: Take 50 mg by mouth Every Night.), Disp: 90 tablet, Rfl: 2    No Known Allergies    Review of Systems   Constitutional: Negative for fever.   HENT: Negative for voice change.    Eyes: Negative for visual disturbance.   Respiratory: Negative for shortness of breath.    Cardiovascular: Negative for chest pain.   Gastrointestinal: Negative for abdominal distention and abdominal pain.   Genitourinary: Negative for dysuria.   Musculoskeletal: Positive for arthralgias. Negative for gait problem and joint swelling.   Skin: Negative for rash.   Neurological: Negative for speech difficulty.   Hematological: Does not bruise/bleed easily.   Psychiatric/Behavioral: Negative for confusion.       Objective  "  Resp 18   Ht 182.9 cm (72\")   Wt 110 kg (242 lb)   BMI 32.82 kg/m²    Physical Exam   Constitutional: He is oriented to person, place, and time. He appears well-nourished.   Pulmonary/Chest: No respiratory distress.   Musculoskeletal:        Left knee: He exhibits abnormal meniscus. He exhibits no ecchymosis and no erythema. Tenderness found. Medial joint line tenderness noted.   Neurological: He is alert and oriented to person, place, and time.   Skin: Capillary refill takes less than 2 seconds.   Psychiatric: He has a normal mood and affect.   Vitals reviewed.    Left Knee Exam     Range of Motion   Extension: 5   Flexion: 100     Tests   Jose:  Medial - positive Lateral - negative  Varus: negative Valgus: negative  Drawer:  Anterior - negative         Other   Erythema: absent  Sensation: normal           Extremity DVT signs are Negative on physical exam with negative Rebekah sign, with no calf pain, with no palpable cords, with no increased pain with passive stretch/extension and with no skin tone change   Neurologic Exam     Mental Status   Oriented to person, place, and time.            Assessment/Plan   Independent Review of Radiographic Studies:    Shows no acute fracture or dislocation.      Large Joint Arthrocentesis: L knee  Date/Time: 11/28/2018 2:05 PM  Consent given by: patient  Site marked: site marked  Timeout: Immediately prior to procedure a time out was called to verify the correct patient, procedure, equipment, support staff and site/side marked as required   Supporting Documentation  Indications: pain   Procedure Details  Location: knee - L knee  Preparation: Patient was prepped and draped in the usual sterile fashion  Needle size: 22 G  Approach: anteromedial  Medications administered: 2 mL lidocaine PF 1% 1 %; 40 mg methylPREDNISolone acetate 40 MG/ML  Patient tolerance: patient tolerated the procedure well with no immediate complications             Benjie was seen today for " pain.    Diagnoses and all orders for this visit:    Positive Jose sign (meniscus tear) of left knee, initial encounter  -     MRI Knee Left Without Contrast  -     Large Joint Arthrocentesis: L knee    Knee injury, left, initial encounter  -     MRI Knee Left Without Contrast  -     Large Joint Arthrocentesis: L knee    Acute pain of left knee  -     MRI Knee Left Without Contrast  -     Large Joint Arthrocentesis: L knee       Orthopedic activities reviewed and patient expressed appreciation  Discussion of orthopedic goals  Risk, benefits, and merits of treatment alternatives reviewed with the patient and questions answered  Call or notify for any adverse effect from injection therapy    Recommendations/Plan:  Exercise, medications, injections, other patient advice, and return appointment as noted.  Patient is encouraged to call or return for any issues or concerns.  I would like to obtain a new MRI given his recent surgery and recent reinjury.  Work status form was completed for the patient.  He is advised to use crutches with toe-touch weightbearing for the next 10 days  FU after MRI     Patient agreeable to call or return sooner for any concerns.

## 2018-11-30 ENCOUNTER — HOSPITAL ENCOUNTER (OUTPATIENT)
Dept: MRI IMAGING | Facility: HOSPITAL | Age: 53
Discharge: HOME | End: 2018-11-30
Admitting: PHYSICIAN ASSISTANT

## 2018-11-30 PROCEDURE — 73721 MRI JNT OF LWR EXTRE W/O DYE: CPT

## 2018-12-03 ENCOUNTER — TELEPHONE (OUTPATIENT)
Dept: ORTHOPEDIC SURGERY | Facility: CLINIC | Age: 53
End: 2018-12-03

## 2018-12-05 ENCOUNTER — TELEPHONE (OUTPATIENT)
Dept: ORTHOPEDIC SURGERY | Facility: CLINIC | Age: 53
End: 2018-12-05

## 2018-12-05 ENCOUNTER — OFFICE VISIT (OUTPATIENT)
Dept: ORTHOPEDIC SURGERY | Facility: CLINIC | Age: 53
End: 2018-12-05

## 2018-12-05 VITALS — BODY MASS INDEX: 32.78 KG/M2 | HEIGHT: 72 IN | RESPIRATION RATE: 18 BRPM | WEIGHT: 242 LBS

## 2018-12-05 DIAGNOSIS — M22.42 CHONDROMALACIA, PATELLA, LEFT: ICD-10-CM

## 2018-12-05 DIAGNOSIS — Z98.890 S/P LEFT KNEE ARTHROSCOPY: Primary | ICD-10-CM

## 2018-12-05 DIAGNOSIS — M17.12 PRIMARY OSTEOARTHRITIS OF LEFT KNEE: ICD-10-CM

## 2018-12-05 PROCEDURE — 99212 OFFICE O/P EST SF 10 MIN: CPT | Performed by: PHYSICIAN ASSISTANT

## 2018-12-05 NOTE — TELEPHONE ENCOUNTER
----- Message from Cecil Tellez PA-C sent at 12/4/2018 11:28 AM EST -----  Please call the patient regarding his MRI.   Please inform the patient there does not appear to be any new meniscal tears but there is worsening cartilage loss.  He may require physical therapy and or visco gel supplementations.

## 2018-12-05 NOTE — PROGRESS NOTES
"Subjective   Patient ID: Benjie Mcgovern is a 53 y.o.  male  Results of the Left Knee (MRI)         History of Present Illness  Patient presents to follow-up in regards to a new left knee injury that occurred on 11/20/2018 while at work.  He was unloading boxes when he developed sharp pain to the knee.  The following day while at work he was walking on an incline when he developed worsening pain causing the need to give out.  Patient was given a cortisone injection at the last office visit on 11/28/2018.  He states he is still having discomfort with certain movements of the knee.  He denies redness or warmth.  Denies numbness or tingling.  He states it is mildly swollen.  He has been utilizing crutches to minimize weightbearing.                                                 Past Medical History:   Diagnosis Date   • Allergy    • Arthritis    • Atrial fibrillation (CMS/[facility]) 2003    REPORTS WAS DIAGNOSED IN 2003 AND THAT HE HAS HAD NO EPISODES SINCE THAT TIME   • ED (erectile dysfunction) of non-organic origin    • Elevated cholesterol     REPORTS WAS TOLD \"BORDERLINE\" BUT THAT HE HAS NEVER TAKEN MEDICATION   • Foot pain    • History of exercise stress test 2003    REPORTS WAS TOLD THAT ALL WAS WNL'S   • History of fracture     HISTORY OF LEFT WRIST AS A CHILD - HAD SURGICAL INTERVENTION   • History of kidney stones     REPORTS MULTIPLE EPISODES AND THAT HE HAS REQUIRED SURGICAL INTERVENTION IN THE PAST   • Hyperlipidemia    • Hypertension    • Wears contact lenses     INSTRUCTED TO LEAVE CONTACTS OUT THE DOS        Past Surgical History:   Procedure Laterality Date   • FACIAL RECONSTRUCTION SURGERY      FROM DOG BITE AS A CHILD   • FRACTURE SURGERY Left     WRIST AS  A CHILD   • KIDNEY STONE SURGERY     • KNEE SURGERY Right 2015       Family History   Problem Relation Age of Onset   • Other Other         brain tumor, renal disease   • Cancer Other    • Stroke Other    • Colon cancer Neg Hx        Social History " "    Socioeconomic History   • Marital status:      Spouse name: Not on file   • Number of children: Not on file   • Years of education: Not on file   • Highest education level: Not on file   Social Needs   • Financial resource strain: Not on file   • Food insecurity - worry: Not on file   • Food insecurity - inability: Not on file   • Transportation needs - medical: Not on file   • Transportation needs - non-medical: Not on file   Occupational History   • Not on file   Tobacco Use   • Smoking status: Never Smoker   • Smokeless tobacco: Never Used   Substance and Sexual Activity   • Alcohol use: No   • Drug use: No   • Sexual activity: Defer   Other Topics Concern   • Not on file   Social History Narrative   • Not on file         Current Outpatient Medications:   •  aspirin 81 MG tablet, Take 81 mg by mouth Daily., Disp: , Rfl:   •  diltiaZEM CD (CARDIZEM CD) 120 MG 24 hr capsule, Take 1 capsule by mouth Every Night., Disp: 90 capsule, Rfl: 2  •  HYDROcodone-acetaminophen (NORCO) 7.5-325 MG per tablet, Take 1 tablet by mouth Every 6 (Six) Hours As Needed for pain, Disp: 24 tablet, Rfl: 0  •  losartan (COZAAR) 50 MG tablet, Take 1 tablet by mouth Daily. (Patient taking differently: Take 50 mg by mouth Every Night.), Disp: 90 tablet, Rfl: 2    No Known Allergies    Review of Systems   Constitutional: Negative for fever.   HENT: Negative for voice change.    Eyes: Negative for visual disturbance.   Respiratory: Negative for shortness of breath.    Cardiovascular: Negative for chest pain.   Gastrointestinal: Negative for abdominal distention and abdominal pain.   Genitourinary: Negative for dysuria.   Musculoskeletal: Positive for arthralgias. Negative for gait problem and joint swelling.   Skin: Negative for rash.   Neurological: Negative for speech difficulty.   Hematological: Does not bruise/bleed easily.   Psychiatric/Behavioral: Negative for confusion.       Objective   Resp 18   Ht 182.9 cm (72\")   Wt 110 " kg (242 lb)   BMI 32.82 kg/m²    Physical Exam   Constitutional: He is oriented to person, place, and time. He appears well-developed.   Pulmonary/Chest: Effort normal.   Musculoskeletal:        Left knee: He exhibits no ecchymosis, no deformity and no erythema. Tenderness found. Medial joint line tenderness noted.   Neurological: He is alert and oriented to person, place, and time.   Psychiatric: He has a normal mood and affect.   Vitals reviewed.    Left Knee Exam     Range of Motion   Left knee extension: 5.   Flexion: 100     Tests   Jose:  Medial - negative Lateral - negative  Varus: negative Valgus: negative  Patellar apprehension: trace    Other   Erythema: absent  Scars: present  Sensation: decreased (along the lateral tibial region-present since recent knee ATS)  Pulse: present           Extremity DVT signs are Negative on physical exam with negative Rebekah sign, with no calf pain, with no palpable cords, with no increased pain with passive stretch/extension and with no skin tone change   Neurologic Exam     Mental Status   Oriented to person, place, and time.      + Left knee patella crepitus          Assessment/Plan   Independent Review of Radiographic Studies:    No new imaging done today.I did review the MRI results with the patient.  There does appear to be postsurgical changes with no evidence of acute meniscal injury or ligamentous tear.  There is high-grade cartilage loss in the medial femoral condyle and medial tibial plateau.      Procedures       Benjie was seen today for results.    Diagnoses and all orders for this visit:    S/P left knee arthroscopy    Primary osteoarthritis of left knee    Chondromalacia, patella, left       Discussion of orthopedic goals  Risk, benefits, and merits of treatment alternatives reviewed with the patient and questions answered    Recommendations/Plan:  Patient is encouraged to call or return for any issues or concerns.    I would highly recommend Visco  supplementation injection given his areas of high-grade cartilage loss.  I feel the Visco supplementation injection would complement the recent knee surgery well to provide a better healing environment for the knee and also to relieve symptoms that I feel are related to the cartilage loss.    Patient agreeable to call or return sooner for any concerns.

## 2018-12-05 NOTE — TELEPHONE ENCOUNTER
Called patient to let him know about MRI and remind him of appointment this afternoon with [place]. The number listed as his mobile # was his wife's number. She advised that patient is aware of his appointment this afternoon.

## 2018-12-10 ENCOUNTER — TELEPHONE (OUTPATIENT)
Dept: ORTHOPEDIC SURGERY | Facility: CLINIC | Age: 53
End: 2018-12-10

## 2018-12-11 ENCOUNTER — TELEPHONE (OUTPATIENT)
Dept: ORTHOPEDIC SURGERY | Facility: CLINIC | Age: 53
End: 2018-12-11

## 2018-12-11 NOTE — TELEPHONE ENCOUNTER
GRIFFIN,     I WILL CANCEL APPEARED APPEAR REVIEW.    lana FORDE DISCUSSED WITH THE PATIENT ON 12/11/2018 AT 1145 THAT HE WAS FEELING BETTER AFTER THE CORTISONE INJECTION AND THEREFORE DO NOT FEEL vISCO SUPPLEMENTATION INJECTION WOULD REQUIRE APPEARED APPEAR REVIEW

## 2018-12-19 RX ORDER — DILTIAZEM HYDROCHLORIDE 120 MG/1
120 CAPSULE, COATED, EXTENDED RELEASE ORAL NIGHTLY
Qty: 90 CAPSULE | Refills: 2 | Status: SHIPPED | OUTPATIENT
Start: 2018-12-19 | End: 2019-09-11 | Stop reason: SDUPTHER

## 2018-12-28 ENCOUNTER — TELEPHONE (OUTPATIENT)
Dept: ORTHOPEDIC SURGERY | Facility: CLINIC | Age: 53
End: 2018-12-28

## 2019-01-10 ENCOUNTER — OFFICE VISIT (OUTPATIENT)
Dept: INTERNAL MEDICINE | Facility: CLINIC | Age: 54
End: 2019-01-10

## 2019-01-10 VITALS
HEART RATE: 75 BPM | WEIGHT: 245 LBS | BODY MASS INDEX: 33.23 KG/M2 | TEMPERATURE: 98.2 F | OXYGEN SATURATION: 100 % | DIASTOLIC BLOOD PRESSURE: 87 MMHG | RESPIRATION RATE: 17 BRPM | SYSTOLIC BLOOD PRESSURE: 142 MMHG

## 2019-01-10 DIAGNOSIS — I10 BENIGN ESSENTIAL HYPERTENSION: Primary | ICD-10-CM

## 2019-01-10 DIAGNOSIS — Z23 NEED FOR HEPATITIS VACCINATION: ICD-10-CM

## 2019-01-10 DIAGNOSIS — I48.20 CHRONIC ATRIAL FIBRILLATION (HCC): ICD-10-CM

## 2019-01-10 DIAGNOSIS — E78.2 MIXED HYPERLIPIDEMIA: ICD-10-CM

## 2019-01-10 PROCEDURE — 99214 OFFICE O/P EST MOD 30 MIN: CPT | Performed by: INTERNAL MEDICINE

## 2019-01-10 PROCEDURE — 90632 HEPA VACCINE ADULT IM: CPT | Performed by: INTERNAL MEDICINE

## 2019-01-10 PROCEDURE — 90471 IMMUNIZATION ADMIN: CPT | Performed by: INTERNAL MEDICINE

## 2019-01-10 NOTE — PROGRESS NOTES
Subjective   Benjie Mcgovern is a 53 y.o. male.     Chief Complaint   Patient presents with   • Hypertension   • Hyperlipidemia   • Atrial Fibrillation       History of Present Illness   HPI: Patient is here to follow up on the blood pressure  and atrial fibrillation, The patient is taking the blood pressure medications as prescribed and has had no side effects. The patient is also here to follow up on the cholesterol and is trying to follow a diet. The patient is   due to get lab work done .  The patient also needs  Hepatitis a shot and declines a flu shot   Hyperlipidemia   Pertinent negatives include no chest pain or shortness of breath.   Hypertension   Pertinent negatives include no chest pain, palpitations or shortness of breath.      The following portions of the patient's history were reviewed and updated as appropriate: allergies, current medications, past family history, past medical history, past social history, past surgical history and problem list.    Review of Systems   Constitutional: Negative for appetite change, fatigue and fever.   HENT: Negative for congestion, ear discharge, ear pain, sinus pressure and sore throat.    Eyes: Negative for pain and discharge.   Respiratory: Negative for cough, shortness of breath and wheezing.    Cardiovascular: Negative for chest pain, palpitations and leg swelling.   Gastrointestinal: Negative for abdominal pain, constipation, diarrhea, nausea and vomiting.   Endocrine: Negative for cold intolerance and heat intolerance.   Genitourinary: Negative for dysuria and flank pain.   Musculoskeletal: Negative for arthralgias and joint swelling.   Skin: Negative for pallor and rash.   Allergic/Immunologic: Negative for environmental allergies and food allergies.   Neurological: Negative for dizziness, weakness and numbness.   Hematological: Negative for adenopathy. Does not bruise/bleed easily.   Psychiatric/Behavioral: Negative for behavioral problems and dysphoric mood.  The patient is not nervous/anxious.          Current Outpatient Medications:   •  aspirin 81 MG tablet, Take 81 mg by mouth Daily., Disp: , Rfl:   •  diltiaZEM CD (CARDIZEM CD) 120 MG 24 hr capsule, TAKE 1 CAPSULE BY MOUTH EVERY NIGHT., Disp: 90 capsule, Rfl: 2  •  losartan (COZAAR) 50 MG tablet, Take 1 tablet by mouth Daily. (Patient taking differently: Take 50 mg by mouth Every Night.), Disp: 90 tablet, Rfl: 2    Objective     Blood pressure 142/87, pulse 75, temperature 98.2 °F (36.8 °C), temperature source Temporal, resp. rate 17, weight 111 kg (245 lb), SpO2 100 %.    Physical Exam   Constitutional: He is oriented to person, place, and time. He appears well-developed and well-nourished. No distress.   HENT:   Head: Normocephalic and atraumatic.   Right Ear: External ear normal.   Left Ear: External ear normal.   Nose: Nose normal.   Mouth/Throat: Oropharynx is clear and moist.   Eyes: Conjunctivae and EOM are normal. Pupils are equal, round, and reactive to light.   Neck: Normal range of motion. Neck supple. No thyromegaly present.   Cardiovascular: Normal rate, regular rhythm and normal heart sounds.   Pulmonary/Chest: Effort normal. No respiratory distress.   Abdominal: Soft. He exhibits no distension. There is no tenderness. There is no guarding.   Musculoskeletal: Normal range of motion. He exhibits no edema.   Lymphadenopathy:     He has no cervical adenopathy.   Neurological: He is alert and oriented to person, place, and time.   No gross motor or sensory deficits   Skin: Skin is warm and dry. He is not diaphoretic. No erythema.   Psychiatric: He has a normal mood and affect.   Nursing note and vitals reviewed.      Results for orders placed or performed in visit on 09/20/18   MRSA Screen Culture - Swab, Nares   Result Value Ref Range    MRSA SCREEN CX       No Methicillin Resistant Staphylococcus aureus isolated   Comprehensive metabolic panel   Result Value Ref Range    Glucose 101 (H) 74 - 98 mg/dL     BUN 17 7 - 20 mg/dL    Creatinine 0.80 0.60 - 1.30 mg/dL    Sodium 142 137 - 145 mmol/L    Potassium 4.2 3.5 - 5.1 mmol/L    Chloride 107 98 - 107 mmol/L    CO2 24.0 (L) 26.0 - 30.0 mmol/L    Calcium 9.3 8.4 - 10.2 mg/dL    Total Protein 7.7 6.3 - 8.2 g/dL    Albumin 4.70 3.50 - 5.00 g/dL    ALT (SGPT) 61 13 - 69 U/L    AST (SGOT) 47 (H) 15 - 46 U/L    Alkaline Phosphatase 120 38 - 126 U/L    Total Bilirubin 0.6 0.2 - 1.3 mg/dL    eGFR Non African Amer 102 >60 mL/min/1.73    Globulin 3.0 gm/dL    A/G Ratio 1.6 1.0 - 2.0 g/dL    BUN/Creatinine Ratio 21.3 6.3 - 21.9    Anion Gap 15.2 10.0 - 20.0 mmol/L   CBC Auto Differential   Result Value Ref Range    WBC 8.23 4.80 - 10.80 10*3/mm3    RBC 5.36 4.70 - 6.10 10*6/mm3    Hemoglobin 16.4 14.0 - 18.0 g/dL    Hematocrit 48.0 42.0 - 52.0 %    MCV 89.6 80.0 - 94.0 fL    MCH 30.6 27.0 - 31.0 pg    MCHC 34.2 30.0 - 37.0 g/dL    RDW 12.6 11.5 - 14.5 %    RDW-SD 41.0 37.0 - 54.0 fl    MPV 10.9 6.0 - 12.0 fL    Platelets 242 130 - 400 10*3/mm3    Neutrophil % 53.9 37.0 - 80.0 %    Lymphocyte % 34.8 10.0 - 50.0 %    Monocyte % 9.0 0.0 - 12.0 %    Eosinophil % 1.5 0.0 - 7.0 %    Basophil % 0.7 0.0 - 2.5 %    Immature Grans % 0.1 0.0 - 0.6 %    Neutrophils, Absolute 4.44 2.00 - 6.90 10*3/mm3    Lymphocytes, Absolute 2.86 0.60 - 3.40 10*3/mm3    Monocytes, Absolute 0.74 0.00 - 0.90 10*3/mm3    Eosinophils, Absolute 0.12 0.00 - 0.70 10*3/mm3    Basophils, Absolute 0.06 0.00 - 0.20 10*3/mm3    Immature Grans, Absolute 0.01 0.00 - 0.06 10*3/mm3    nRBC 0.0 0.0 - 0.0 /100 WBC         Assessment/Plan   Benjie was seen today for hypertension, hyperlipidemia and atrial fibrillation.    Diagnoses and all orders for this visit:    Benign essential hypertension    Mixed hyperlipidemia  -     CBC & Differential  -     Comprehensive Metabolic Panel  -     Lipid Panel    Need for hepatitis vaccination  -     Hepatitis A Vaccine Adult IM    Chronic atrial fibrillation (CMS/HCC)      Plan:  1.   Benign essential hypertension: Will continue current medication, low-sodium diet advised  2.mixed hyperlipidemia: will obtain   fasting CMP and lipid panel.  Diet and exercise counseled,    3.  Atrial fibrillation: We will continue current medication, rate controlled  4. Need for hepatitis a vaccine :given today             Evie Martin MD

## 2019-01-22 ENCOUNTER — OFFICE VISIT (OUTPATIENT)
Dept: ORTHOPEDIC SURGERY | Facility: CLINIC | Age: 54
End: 2019-01-22

## 2019-01-22 VITALS — WEIGHT: 247.9 LBS | BODY MASS INDEX: 33.58 KG/M2 | HEIGHT: 72 IN | RESPIRATION RATE: 18 BRPM

## 2019-01-22 DIAGNOSIS — Z98.890 HX OF ARTHROSCOPY OF LEFT KNEE: ICD-10-CM

## 2019-01-22 DIAGNOSIS — M17.12 PRIMARY OSTEOARTHRITIS OF LEFT KNEE: Primary | ICD-10-CM

## 2019-01-22 PROCEDURE — 99213 OFFICE O/P EST LOW 20 MIN: CPT | Performed by: PHYSICIAN ASSISTANT

## 2019-01-22 RX ORDER — MELOXICAM 15 MG/1
15 TABLET ORAL DAILY
Qty: 30 TABLET | Refills: 1 | Status: SHIPPED | OUTPATIENT
Start: 2019-01-22 | End: 2019-05-24

## 2019-01-22 NOTE — PROGRESS NOTES
Subjective   Patient ID: Benjie Mcgovern is a 53 y.o.  male  Pain of the Left Knee (Patient here today to follow up left knee pain. He states it is about the same as last visit. )         History of Present Illness  Patient is following up in regards to left knee arthralgia.  Patient had a left knee arthroscopy September 27, 2018 by Dr. Garcia.  Patient states after his procedure he was doing great.  He states he had no pain however, on 11/20/2018 while at work he was unloading boxes and developed sudden sharp pain to the inner aspect of the left knee.  Patient states since he has had increased pain with worsening pain upon ambulation.  He denies knee instability or locking of the knee.  Patient has tried over-the-counter anti-inflammatories as well as a cortisone injection given on 11/28/2018.  He states the injection helped temporarily and only provided minimal relief.     He brings in a letter from an independent medical evaluator that took place on 1/7/2019 and per the JULIANNE he was cleared to return to work with no restrictions.  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, bending and squatting delivering heavy packages  Pain Score: 5  Pain Location: Knee  Pain Orientation: Left     Pain Descriptors: Discomfort  Pain Frequency: Constant/continuous  Pain Onset: Ongoing     Clinical Progression: Not changed  Aggravating Factors: Walking, Standing        Pain Intervention(s): Home medication          Past Medical History:   Diagnosis Date   • Allergy    • Arthritis    • Atrial fibrillation (CMS/HCC) 2003    REPORTS WAS DIAGNOSED IN 2003 AND THAT HE HAS HAD NO EPISODES SINCE THAT TIME   • ED (erectile dysfunction) of non-organic origin    • Elevated cholesterol     REPORTS WAS TOLD  "\"BORDERLINE\" BUT THAT HE HAS NEVER TAKEN MEDICATION   • Foot pain    • History of exercise stress test 2003    REPORTS WAS TOLD THAT ALL WAS WNL'S   • History of fracture     HISTORY OF LEFT WRIST AS A CHILD - HAD SURGICAL INTERVENTION   • History of kidney stones     REPORTS MULTIPLE EPISODES AND THAT HE HAS REQUIRED SURGICAL INTERVENTION IN THE PAST   • Hyperlipidemia    • Hypertension    • Wears contact lenses     INSTRUCTED TO LEAVE CONTACTS OUT THE DOS        Past Surgical History:   Procedure Laterality Date   • COLONOSCOPY N/A 8/7/2017    Procedure: COLONOSCOPY WITH BXS, POLYPECTOMY;  Surgeon: Fermín Hamilton MD;  Location: [facility] ENDOSCOPY;  Service:    • FACIAL RECONSTRUCTION SURGERY      FROM DOG BITE AS A CHILD   • FRACTURE SURGERY Left     WRIST AS  A CHILD   • KIDNEY STONE SURGERY     • KNEE ARTHROSCOPY Left 9/27/2018    Procedure: Knee diagnostic arthroscopy, left with partial medial menisectomy, removal of loose bodies and two compartment chondroplasty;  Surgeon: Dean Garcia MD;  Location: [facility] OR;  Service: Orthopedics   • KNEE SURGERY Right 2015       Family History   Problem Relation Age of Onset   • Other Other         brain tumor, renal disease   • Cancer Other    • Stroke Other    • Colon cancer Neg Hx        Social History     Socioeconomic History   • Marital status:      Spouse name: Not on file   • Number of children: Not on file   • Years of education: Not on file   • Highest education level: Not on file   Social Needs   • Financial resource strain: Not on file   • Food insecurity - worry: Not on file   • Food insecurity - inability: Not on file   • Transportation needs - medical: Not on file   • Transportation needs - non-medical: Not on file   Occupational History   • Not on file   Tobacco Use   • Smoking status: Never Smoker   • Smokeless tobacco: Never Used   Substance and Sexual Activity   • Alcohol use: No   • Drug use: No   • Sexual activity: Defer   Other Topics " "Concern   • Not on file   Social History Narrative   • Not on file         Current Outpatient Medications:   •  aspirin 81 MG tablet, Take 81 mg by mouth Daily., Disp: , Rfl:   •  diltiaZEM CD (CARDIZEM CD) 120 MG 24 hr capsule, TAKE 1 CAPSULE BY MOUTH EVERY NIGHT., Disp: 90 capsule, Rfl: 2  •  losartan (COZAAR) 50 MG tablet, Take 1 tablet by mouth Daily. (Patient taking differently: Take 50 mg by mouth Every Night.), Disp: 90 tablet, Rfl: 2  •  meloxicam (MOBIC) 15 MG tablet, Take 1 tablet by mouth Daily., Disp: 30 tablet, Rfl: 1    No Known Allergies    Review of Systems   Constitutional: Negative for fever.   HENT: Negative for voice change.    Eyes: Negative for visual disturbance.   Respiratory: Negative for shortness of breath.    Cardiovascular: Negative for chest pain.   Gastrointestinal: Negative for abdominal distention and abdominal pain.   Genitourinary: Negative for dysuria.   Musculoskeletal: Positive for arthralgias. Negative for gait problem and joint swelling.   Skin: Negative for rash.   Neurological: Negative for speech difficulty.   Hematological: Does not bruise/bleed easily.   Psychiatric/Behavioral: Negative for confusion.       Objective   Resp 18   Ht 182.9 cm (72\")   Wt 112 kg (247 lb 14.4 oz)   BMI 33.62 kg/m²    Physical Exam   Constitutional: He is oriented to person, place, and time. He appears well-developed.   Eyes: Conjunctivae are normal.   Pulmonary/Chest: Effort normal. No respiratory distress.   Musculoskeletal:        Left knee: He exhibits normal range of motion, no ecchymosis, no deformity and no erythema. Tenderness found. Medial joint line tenderness noted.   Neurological: He is alert and oriented to person, place, and time.   Skin: Capillary refill takes less than 2 seconds.   Psychiatric: He has a normal mood and affect.   Vitals reviewed.    Left Knee Exam     Range of Motion   Left knee extension: 4.   Left knee flexion: 115.     Tests   Jose:  Medial - negative " Lateral - negative  Drawer:  Anterior - negative     Posterior - negative  Patellar apprehension: trace    Other   Erythema: absent  Sensation: normal           Extremity DVT signs are Negative on physical exam with negative Rebekah sign, with no calf pain, with no palpable cords, with no increased pain with passive stretch/extension and with no skin tone change   Neurologic Exam     Mental Status   Oriented to person, place, and time.            Assessment/Plan   Independent Review of Radiographic Studies:    No new imaging done today.  Reviewed with patient at a prior visit.      Procedures       Benjie was seen today for pain.    Diagnoses and all orders for this visit:    Primary osteoarthritis of left knee  -     Ambulatory Referral to Physical Therapy Evaluate and treat (quad/vmo program), Ortho; Heat    Hx of arthroscopy of left knee  -     Ambulatory Referral to Physical Therapy Evaluate and treat (quad/vmo program), Ortho; Heat    Other orders  -     meloxicam (MOBIC) 15 MG tablet; Take 1 tablet by mouth Daily.       Orthopedic activities reviewed and patient expressed appreciation  Discussion of orthopedic goals  Risk, benefits, and merits of treatment alternatives reviewed with the patient and questions answered  Physical therapy referral given  Use brace as instructed  Ice, heat, and/or modalities as beneficial    Recommendations/Plan:  Exercise, medications, injections, other patient advice, and return appointment as noted.  Patient is encouraged to call or return for any issues or concerns.      After reviewing the JULIANNE and the patient's exam, I do feel he could return to work but he would need to utilize a knee brace while working as well as taking a daily anti-inflammatory.  Patient has an thumb insurance which does not cover Visco supplementation injections.-We will try to submit a patient assistance program form for Verito to see if he would qualify  Patient agreeable to call or return sooner for any  concerns.

## 2019-02-06 ENCOUNTER — DOCUMENTATION (OUTPATIENT)
Dept: ORTHOPEDIC SURGERY | Facility: CLINIC | Age: 54
End: 2019-02-06

## 2019-02-06 NOTE — PROGRESS NOTES
Received denial for Verito from [facility], faxed appeal letter with office notes, WC denial to Appeals Committee at [facility] at [phone]

## 2019-02-15 ENCOUNTER — OFFICE VISIT (OUTPATIENT)
Dept: ORTHOPEDIC SURGERY | Facility: CLINIC | Age: 54
End: 2019-02-15

## 2019-02-15 VITALS — WEIGHT: 247 LBS | BODY MASS INDEX: 33.46 KG/M2 | HEIGHT: 72 IN | RESPIRATION RATE: 18 BRPM

## 2019-02-15 DIAGNOSIS — Z98.890 HX OF ARTHROSCOPY OF LEFT KNEE: ICD-10-CM

## 2019-02-15 DIAGNOSIS — M17.12 PRIMARY OSTEOARTHRITIS OF LEFT KNEE: Primary | ICD-10-CM

## 2019-02-15 PROCEDURE — 20610 DRAIN/INJ JOINT/BURSA W/O US: CPT | Performed by: PHYSICIAN ASSISTANT

## 2019-02-15 NOTE — PROGRESS NOTES
"Subjective   Benjie Mcgovern is a 53 y.o. male here today for injection therapy.    Chief Complaint   Patient presents with   • Left Knee - Injections       Past Medical History:   Diagnosis Date   • Allergy    • Arthritis    • Atrial fibrillation (CMS/HCC) 2003    REPORTS WAS DIAGNOSED IN 2003 AND THAT HE HAS HAD NO EPISODES SINCE THAT TIME   • ED (erectile dysfunction) of non-organic origin    • Elevated cholesterol     REPORTS WAS TOLD \"BORDERLINE\" BUT THAT HE HAS NEVER TAKEN MEDICATION   • Foot pain    • History of exercise stress test 2003    REPORTS WAS TOLD THAT ALL WAS WNL'S   • History of fracture     HISTORY OF LEFT WRIST AS A CHILD - HAD SURGICAL INTERVENTION   • History of kidney stones     REPORTS MULTIPLE EPISODES AND THAT HE HAS REQUIRED SURGICAL INTERVENTION IN THE PAST   • Hyperlipidemia    • Hypertension    • Wears contact lenses     INSTRUCTED TO LEAVE CONTACTS OUT THE DOS        Past Surgical History:   Procedure Laterality Date   • COLONOSCOPY WITH BXS, POLYPECTOMY N/A 8/7/2017    Performed by Fermín Hamilton MD at [facility] ENDOSCOPY   • FACIAL RECONSTRUCTION SURGERY      FROM DOG BITE AS A CHILD   • FRACTURE SURGERY Left     WRIST AS  A CHILD   • KIDNEY STONE SURGERY     • Knee diagnostic arthroscopy, left with partial medial menisectomy, removal of loose bodies and two compartment chondroplasty Left 9/27/2018    Performed by Dean Garcia MD at [facility] OR   • KNEE SURGERY Right 2015       No Known Allergies    Objective   Resp 18   Ht 182.9 cm (72\")   Wt 112 kg (247 lb)   BMI 33.50 kg/m²    Physical Exam  Skin exam stable with no erythema, ecchymosis or rash. No new swelling. No motor or sensory changes. Distal pulse intact.    Large Joint Arthrocentesis: L knee  Date/Time: 2/15/2019 1:33 PM  Consent given by: patient  Site marked: site marked  Timeout: Immediately prior to procedure a time out was called to verify the correct patient, procedure, equipment, support staff and site/side " marked as required   Supporting Documentation  Indications: pain   Procedure Details  Location: knee - L knee  Preparation: Patient was prepped and draped in the usual sterile fashion  Needle gauge: 21 G.  Approach: anterolateral  Medications administered: 25 mg sodium hyaluronate 25 MG/2.5ML  Patient tolerance: patient tolerated the procedure well with no immediate complications          Assessment/Plan      Diagnosis Plan   1. Primary osteoarthritis of left knee  Large Joint Arthrocentesis: L knee   2. Hx of arthroscopy of left knee         No complications of injection noted.    Discussion of orthopaedic goals and activities and patient and/or guardian expressed appreciation. Call or notify for any adverse effect from injection therapy. Ice, heat, and/or modalities as beneficial. Watch for signs and symptoms of infection.    Recommendations:  Work/Activity Status: May perform usual activities as tolerated. May return to routine exercise and physical work as tolerated. No strenuous activity.  Patient and/or guardian is encouraged to call or return for any issues or concerns.  FU in 1 week   Patient agreeable to call or return sooner for any concerns.

## 2019-02-22 ENCOUNTER — OFFICE VISIT (OUTPATIENT)
Dept: ORTHOPEDIC SURGERY | Facility: CLINIC | Age: 54
End: 2019-02-22

## 2019-02-22 VITALS — RESPIRATION RATE: 18 BRPM | HEIGHT: 72 IN | WEIGHT: 247 LBS | BODY MASS INDEX: 33.46 KG/M2

## 2019-02-22 DIAGNOSIS — M17.12 PRIMARY OSTEOARTHRITIS OF LEFT KNEE: Primary | ICD-10-CM

## 2019-02-22 PROCEDURE — 20610 DRAIN/INJ JOINT/BURSA W/O US: CPT | Performed by: PHYSICIAN ASSISTANT

## 2019-02-22 NOTE — PROGRESS NOTES
"Subjective   Benjie Mcgovern is a 53 y.o. male here today for injection therapy.    Chief Complaint   Patient presents with   • Left Knee - Follow-up, Pain     Patient is here today for his 2nd gel injection.       Past Medical History:   Diagnosis Date   • Allergy    • Arthritis    • Atrial fibrillation (CMS/HCC) 2003    REPORTS WAS DIAGNOSED IN 2003 AND THAT HE HAS HAD NO EPISODES SINCE THAT TIME   • ED (erectile dysfunction) of non-organic origin    • Elevated cholesterol     REPORTS WAS TOLD \"BORDERLINE\" BUT THAT HE HAS NEVER TAKEN MEDICATION   • Foot pain    • History of exercise stress test 2003    REPORTS WAS TOLD THAT ALL WAS WNL'S   • History of fracture     HISTORY OF LEFT WRIST AS A CHILD - HAD SURGICAL INTERVENTION   • History of kidney stones     REPORTS MULTIPLE EPISODES AND THAT HE HAS REQUIRED SURGICAL INTERVENTION IN THE PAST   • Hyperlipidemia    • Hypertension    • Wears contact lenses     INSTRUCTED TO LEAVE CONTACTS OUT THE DOS        Past Surgical History:   Procedure Laterality Date   • COLONOSCOPY N/A 8/7/2017    Procedure: COLONOSCOPY WITH BXS, POLYPECTOMY;  Surgeon: Fermín Hamilton MD;  Location: [facility] ENDOSCOPY;  Service:    • FACIAL RECONSTRUCTION SURGERY      FROM DOG BITE AS A CHILD   • FRACTURE SURGERY Left     WRIST AS  A CHILD   • KIDNEY STONE SURGERY     • KNEE ARTHROSCOPY Left 9/27/2018    Procedure: Knee diagnostic arthroscopy, left with partial medial menisectomy, removal of loose bodies and two compartment chondroplasty;  Surgeon: Dean Garcia MD;  Location: [facility] OR;  Service: Orthopedics   • KNEE SURGERY Right 2015       No Known Allergies    Objective   Resp 18   Ht 182.9 cm (72\")   Wt 112 kg (247 lb)   BMI 33.50 kg/m²    Physical Exam  Skin exam stable with no erythema, ecchymosis or rash. No new swelling. No motor or sensory changes. Distal pulse intact.    Large Joint Arthrocentesis: L knee-SAMPLE  Date/Time: 2/22/2019 9:34 AM  Consent given by: patient  Site " marked: site marked  Timeout: Immediately prior to procedure a time out was called to verify the correct patient, procedure, equipment, support staff and site/side marked as required   Supporting Documentation  Indications: pain   Procedure Details  Location: knee - L knee  Preparation: Patient was prepped and draped in the usual sterile fashion  Needle size: 22 G  Medication group details: [id number] EXP-05/31/2020.  Patient tolerance: patient tolerated the procedure well with no immediate complications          Assessment/Plan      Diagnosis Plan   1. Primary osteoarthritis of left knee  Large Joint Arthrocentesis: L knee-SAMPLE       No complications of injection noted.    Discussion of orthopaedic goals and activities and patient and/or guardian expressed appreciation. Call or notify for any adverse effect from injection therapy. Ice, heat, and/or modalities as beneficial. Watch for signs and symptoms of infection.    Recommendations:  Work/Activity Status: May perform usual activities as tolerated. May return to routine exercise and physical work as tolerated. No strenuous activity.  Patient and/or guardian is encouraged to call or return for any issues or concerns.    Fu IN 1 WEEK  Patient agreeable to call or return sooner for any concerns.

## 2019-03-01 ENCOUNTER — OFFICE VISIT (OUTPATIENT)
Dept: ORTHOPEDIC SURGERY | Facility: CLINIC | Age: 54
End: 2019-03-01

## 2019-03-01 VITALS — RESPIRATION RATE: 18 BRPM | HEIGHT: 72 IN | WEIGHT: 247 LBS | BODY MASS INDEX: 33.46 KG/M2

## 2019-03-01 DIAGNOSIS — M17.12 PRIMARY OSTEOARTHRITIS OF LEFT KNEE: Primary | ICD-10-CM

## 2019-03-01 PROCEDURE — 20610 DRAIN/INJ JOINT/BURSA W/O US: CPT | Performed by: PHYSICIAN ASSISTANT

## 2019-03-01 NOTE — PROGRESS NOTES
"Subjective   Benjie Mcgovern is a 53 y.o. male here today for injection therapy.    Chief Complaint   Patient presents with   • Left Knee - Injections     Gelsyn - Sample Med        Past Medical History:   Diagnosis Date   • Allergy    • Arthritis    • Atrial fibrillation (CMS/HCC) 2003    REPORTS WAS DIAGNOSED IN 2003 AND THAT HE HAS HAD NO EPISODES SINCE THAT TIME   • ED (erectile dysfunction) of non-organic origin    • Elevated cholesterol     REPORTS WAS TOLD \"BORDERLINE\" BUT THAT HE HAS NEVER TAKEN MEDICATION   • Foot pain    • History of exercise stress test 2003    REPORTS WAS TOLD THAT ALL WAS WNL'S   • History of fracture     HISTORY OF LEFT WRIST AS A CHILD - HAD SURGICAL INTERVENTION   • History of kidney stones     REPORTS MULTIPLE EPISODES AND THAT HE HAS REQUIRED SURGICAL INTERVENTION IN THE PAST   • Hyperlipidemia    • Hypertension    • Wears contact lenses     INSTRUCTED TO LEAVE CONTACTS OUT THE DOS        Past Surgical History:   Procedure Laterality Date   • COLONOSCOPY N/A 8/7/2017    Procedure: COLONOSCOPY WITH BXS, POLYPECTOMY;  Surgeon: Fermín Hamilton MD;  Location: [facility] ENDOSCOPY;  Service:    • FACIAL RECONSTRUCTION SURGERY      FROM DOG BITE AS A CHILD   • FRACTURE SURGERY Left     WRIST AS  A CHILD   • KIDNEY STONE SURGERY     • KNEE ARTHROSCOPY Left 9/27/2018    Procedure: Knee diagnostic arthroscopy, left with partial medial menisectomy, removal of loose bodies and two compartment chondroplasty;  Surgeon: Dean Garcia MD;  Location: [facility] OR;  Service: Orthopedics   • KNEE SURGERY Right 2015       No Known Allergies    Objective   Resp 18   Ht 182.9 cm (72\")   Wt 112 kg (247 lb)   BMI 33.50 kg/m²    Physical Exam  Skin exam stable with no erythema, ecchymosis or rash. No new swelling. No motor or sensory changes. Distal pulse intact.    Large Joint Arthrocentesis: L knee  Date/Time: 3/1/2019 10:31 AM  Consent given by: patient  Site marked: site marked  Timeout: Immediately " prior to procedure a time out was called to verify the correct patient, procedure, equipment, support staff and site/side marked as required   Supporting Documentation  Indications: pain   Procedure Details  Location: knee - L knee  Preparation: Patient was prepped and draped in the usual sterile fashion  Needle size: 22 G  Medication group details: Gelsyn 3 - [id number] LOT: [id number] EXP: 05/31/2020 SAMPLE MED   Patient tolerance: patient tolerated the procedure well with no immediate complications          Assessment/Plan      Diagnosis Plan   1. Primary osteoarthritis of left knee  Large Joint Arthrocentesis: L knee       No complications of injection noted.    Discussion of orthopaedic goals and activities and patient and/or guardian expressed appreciation. Call or notify for any adverse effect from injection therapy. Ice, heat, and/or modalities as beneficial. Watch for signs and symptoms of infection.    Recommendations:  Work/Activity Status: May perform usual activities as tolerated. May return to routine exercise and physical work as tolerated. No strenuous activity.  Patient and/or guardian is encouraged to call or return for any issues or concerns.    FU PRN  Patient agreeable to call or return sooner for any concerns.

## 2019-03-25 ENCOUNTER — TELEPHONE (OUTPATIENT)
Dept: ORTHOPEDIC SURGERY | Facility: CLINIC | Age: 54
End: 2019-03-25

## 2019-03-25 NOTE — TELEPHONE ENCOUNTER
Patient called the office requesting to see if [place] could type him a statement stating that his recent injury is a new injury and is not from his sx. He is wanting someone to contact him with a status.

## 2019-03-27 NOTE — TELEPHONE ENCOUNTER
Spoke with patient. He just needs a letter stating the 2nd injury is a new injury and is not related to the previous surgery. He would like the letter mailed to his home address. Letter will be printed and sent today.

## 2019-05-24 ENCOUNTER — OFFICE VISIT (OUTPATIENT)
Dept: ORTHOPEDIC SURGERY | Facility: CLINIC | Age: 54
End: 2019-05-24

## 2019-05-24 VITALS — WEIGHT: 236 LBS | HEIGHT: 72 IN | RESPIRATION RATE: 18 BRPM | BODY MASS INDEX: 31.97 KG/M2

## 2019-05-24 DIAGNOSIS — M22.42 CHONDROMALACIA, PATELLA, LEFT: ICD-10-CM

## 2019-05-24 DIAGNOSIS — Z98.890 HX OF ARTHROSCOPY OF LEFT KNEE: ICD-10-CM

## 2019-05-24 DIAGNOSIS — M17.12 PRIMARY OSTEOARTHRITIS OF LEFT KNEE: Primary | ICD-10-CM

## 2019-05-24 PROCEDURE — 99213 OFFICE O/P EST LOW 20 MIN: CPT | Performed by: PHYSICIAN ASSISTANT

## 2019-05-24 PROCEDURE — 20610 DRAIN/INJ JOINT/BURSA W/O US: CPT | Performed by: PHYSICIAN ASSISTANT

## 2019-05-24 RX ORDER — TRIAMCINOLONE ACETONIDE 40 MG/ML
40 INJECTION, SUSPENSION INTRA-ARTICULAR; INTRAMUSCULAR
Status: COMPLETED | OUTPATIENT
Start: 2019-05-24 | End: 2019-05-24

## 2019-05-24 RX ORDER — CELECOXIB 200 MG/1
200 CAPSULE ORAL 2 TIMES DAILY
Qty: 60 CAPSULE | Refills: 0 | Status: SHIPPED | OUTPATIENT
Start: 2019-05-24 | End: 2019-06-16 | Stop reason: SDUPTHER

## 2019-05-24 RX ORDER — LIDOCAINE HYDROCHLORIDE 10 MG/ML
2 INJECTION, SOLUTION INFILTRATION; PERINEURAL
Status: COMPLETED | OUTPATIENT
Start: 2019-05-24 | End: 2019-05-24

## 2019-05-24 RX ADMIN — LIDOCAINE HYDROCHLORIDE 2 ML: 10 INJECTION, SOLUTION INFILTRATION; PERINEURAL at 09:41

## 2019-05-24 RX ADMIN — TRIAMCINOLONE ACETONIDE 40 MG: 40 INJECTION, SUSPENSION INTRA-ARTICULAR; INTRAMUSCULAR at 09:41

## 2019-05-24 NOTE — PROGRESS NOTES
"Subjective   Patient ID: Benjie Mcgovern is a 53 y.o. right hand dominant male  Follow-up and Pain of the Left Knee (Patient had gel injections which provided relief for approximately 1 month.)         History of Present Illness  Patient presents with continued left knee pain.   Her tried visco injections in past with only one month of relief.    Denies redness or warmth to the left knee.   Currently takes Mobic but feels this is no longer effective.  There is no sensation of knee instability or mechanical locking      Pain Score: 5  Pain Location: Knee  Pain Orientation: Left     Pain Descriptors: Aching, Throbbing  Pain Frequency: Constant/continuous  Pain Onset: Ongoing     Clinical Progression: Not changed  Aggravating Factors: Walking, Standing, Bending        Pain Intervention(s): Rest, Home medication  Result of Injury: No  Work-Related Injury: No    Past Medical History:   Diagnosis Date   • Allergy    • Arthritis    • Atrial fibrillation (CMS/HCC) 2003    REPORTS WAS DIAGNOSED IN 2003 AND THAT HE HAS HAD NO EPISODES SINCE THAT TIME   • ED (erectile dysfunction) of non-organic origin    • Elevated cholesterol     REPORTS WAS TOLD \"BORDERLINE\" BUT THAT HE HAS NEVER TAKEN MEDICATION   • Foot pain    • History of exercise stress test 2003    REPORTS WAS TOLD THAT ALL WAS WNL'S   • History of fracture     HISTORY OF LEFT WRIST AS A CHILD - HAD SURGICAL INTERVENTION   • History of kidney stones     REPORTS MULTIPLE EPISODES AND THAT HE HAS REQUIRED SURGICAL INTERVENTION IN THE PAST   • Hyperlipidemia    • Hypertension    • Wears contact lenses     INSTRUCTED TO LEAVE CONTACTS OUT THE DOS        Past Surgical History:   Procedure Laterality Date   • COLONOSCOPY N/A 8/7/2017    Procedure: COLONOSCOPY WITH BXS, POLYPECTOMY;  Surgeon: Fermín Hamilton MD;  Location: [facility] ENDOSCOPY;  Service:    • FACIAL RECONSTRUCTION SURGERY      FROM DOG BITE AS A CHILD   • FRACTURE SURGERY Left     WRIST AS  A CHILD   • KIDNEY " STONE SURGERY     • KNEE ARTHROSCOPY Left 9/27/2018    Procedure: Knee diagnostic arthroscopy, left with partial medial menisectomy, removal of loose bodies and two compartment chondroplasty;  Surgeon: Dean Garcia MD;  Location: [facility];  Service: Orthopedics   • KNEE SURGERY Right 2015       Family History   Problem Relation Age of Onset   • Other Other         brain tumor, renal disease   • Cancer Other    • Stroke Other    • Colon cancer Neg Hx        Social History     Socioeconomic History   • Marital status:      Spouse name: Not on file   • Number of children: Not on file   • Years of education: Not on file   • Highest education level: Not on file   Tobacco Use   • Smoking status: Never Smoker   • Smokeless tobacco: Never Used   Substance and Sexual Activity   • Alcohol use: No   • Drug use: No   • Sexual activity: Defer       I have reviewed all of the above social hx, family hx, surgical hx, medications, allergies & ROS and confirm that it is accurate.      Current Outpatient Medications:   •  aspirin 81 MG tablet, Take 81 mg by mouth Daily., Disp: , Rfl:   •  diltiaZEM CD (CARDIZEM CD) 120 MG 24 hr capsule, TAKE 1 CAPSULE BY MOUTH EVERY NIGHT., Disp: 90 capsule, Rfl: 2  •  losartan (COZAAR) 50 MG tablet, Take 1 tablet by mouth Daily. (Patient taking differently: Take 50 mg by mouth Every Night.), Disp: 90 tablet, Rfl: 2  •  celecoxib (CeleBREX) 200 MG capsule, Take 1 capsule by mouth 2 (Two) Times a Day., Disp: 60 capsule, Rfl: 0    No Known Allergies    Review of Systems   Constitutional: Negative for diaphoresis, fever and unexpected weight change.   HENT: Negative for dental problem and sore throat.    Eyes: Negative for visual disturbance.   Respiratory: Negative for shortness of breath.    Cardiovascular: Negative for chest pain.   Gastrointestinal: Negative for abdominal pain, constipation, diarrhea, nausea and vomiting.   Genitourinary: Negative for difficulty urinating and  "frequency.   Musculoskeletal: Positive for arthralgias.   Neurological: Negative for headaches.   Hematological: Does not bruise/bleed easily.       Objective   Resp 18   Ht 182.9 cm (72\")   Wt 107 kg (236 lb)   BMI 32.01 kg/m²    Physical Exam   Constitutional: He is oriented to person, place, and time. He appears well-developed.   Pulmonary/Chest: Effort normal.   Musculoskeletal:        Left knee: He exhibits no swelling, no effusion, no ecchymosis, no deformity and no erythema. Tenderness found. Medial joint line tenderness noted. No lateral joint line tenderness noted.        Left ankle: No tenderness. No lateral malleolus and no medial malleolus tenderness found.   Neurological: He is alert and oriented to person, place, and time.   Psychiatric: He has a normal mood and affect.   Vitals reviewed.    Left Knee Exam     Range of Motion   Extension: 5   Flexion: 110     Tests   Jose:  Medial - positive Lateral - negative  Varus: negative Valgus: negative  Drawer:  Anterior - negative     Posterior - negative    Other   Erythema: absent  Scars: present  Sensation: normal  Pulse: present  Effusion: no effusion present           Extremity DVT signs are Negative on physical exam with negative Rebekah sign, with no calf pain, with no palpable cords, with no increased pain with passive stretch/extension and with no skin tone change   Neurologic Exam     Mental Status   Oriented to person, place, and time.          + left knee patella crepitus      Assessment/Plan   Independent Review of Radiographic Studies:    No new imaging done today.  Reviewed with patient at a prior visit.      Large Joint Arthrocentesis: L knee  Date/Time: 5/24/2019 9:41 AM  Consent given by: patient  Site marked: site marked  Timeout: Immediately prior to procedure a time out was called to verify the correct patient, procedure, equipment, support staff and site/side marked as required   Supporting Documentation  Indications: pain "   Procedure Details  Location: knee - L knee  Preparation: Patient was prepped and draped in the usual sterile fashion  Needle size: 22 G  Approach: anteromedial  Medications administered: 2 mL lidocaine 1 %; 40 mg triamcinolone acetonide 40 MG/ML  Patient tolerance: patient tolerated the procedure well with no immediate complications             Benjie was seen today for follow-up and pain.    Diagnoses and all orders for this visit:    Primary osteoarthritis of left knee    Hx of arthroscopy of left knee    Chondromalacia, patella, left    Other orders  -     celecoxib (CeleBREX) 200 MG capsule; Take 1 capsule by mouth 2 (Two) Times a Day.  -     Large Joint Arthrocentesis: L knee       Orthopedic activities reviewed and patient expressed appreciation  Discussion of orthopedic goals  Risk, benefits, and merits of treatment alternatives reviewed with the patient and questions answered  Call or notify for any adverse effect from injection therapy  Ice, heat, and/or modalities as beneficial    Recommendations/Plan:  Exercise, medications, injections, other patient advice, and return appointment as noted.  Patient is encouraged to call or return for any issues or concerns.  FU in 3 months  Briefly discussed partial knee replacement- will hold off at this time  Would need labs to r/o RA, gout and will need left knee stress views to see if a candidate for UKR    D/C Mobic  Patient agreeable to call or return sooner for any concerns.

## 2019-06-16 DIAGNOSIS — M17.12 PRIMARY OSTEOARTHRITIS OF LEFT KNEE: Primary | ICD-10-CM

## 2019-06-17 RX ORDER — CELECOXIB 200 MG/1
CAPSULE ORAL
Qty: 60 CAPSULE | Refills: 0 | Status: SHIPPED | OUTPATIENT
Start: 2019-06-17 | End: 2019-08-13 | Stop reason: SDUPTHER

## 2019-07-19 DIAGNOSIS — M17.12 PRIMARY OSTEOARTHRITIS OF LEFT KNEE: ICD-10-CM

## 2019-07-19 RX ORDER — CELECOXIB 200 MG/1
200 CAPSULE ORAL 2 TIMES DAILY
Qty: 60 CAPSULE | Refills: 0 | Status: SHIPPED | OUTPATIENT
Start: 2019-07-19 | End: 2020-01-03

## 2019-08-13 DIAGNOSIS — M17.12 PRIMARY OSTEOARTHRITIS OF LEFT KNEE: ICD-10-CM

## 2019-08-13 RX ORDER — CELECOXIB 200 MG/1
CAPSULE ORAL
Qty: 60 CAPSULE | Refills: 0 | Status: SHIPPED | OUTPATIENT
Start: 2019-08-13 | End: 2019-08-19

## 2019-08-19 ENCOUNTER — OFFICE VISIT (OUTPATIENT)
Dept: INTERNAL MEDICINE | Facility: CLINIC | Age: 54
End: 2019-08-19

## 2019-08-19 VITALS
TEMPERATURE: 97.2 F | HEART RATE: 68 BPM | OXYGEN SATURATION: 98 % | WEIGHT: 241 LBS | DIASTOLIC BLOOD PRESSURE: 84 MMHG | BODY MASS INDEX: 32.64 KG/M2 | RESPIRATION RATE: 17 BRPM | HEIGHT: 72 IN | SYSTOLIC BLOOD PRESSURE: 132 MMHG

## 2019-08-19 DIAGNOSIS — Z23 NEED FOR HEPATITIS VACCINATION: ICD-10-CM

## 2019-08-19 DIAGNOSIS — E78.2 MIXED HYPERLIPIDEMIA: ICD-10-CM

## 2019-08-19 DIAGNOSIS — I48.20 CHRONIC ATRIAL FIBRILLATION (HCC): ICD-10-CM

## 2019-08-19 DIAGNOSIS — I10 BENIGN ESSENTIAL HYPERTENSION: Primary | ICD-10-CM

## 2019-08-19 PROCEDURE — 90471 IMMUNIZATION ADMIN: CPT | Performed by: INTERNAL MEDICINE

## 2019-08-19 PROCEDURE — 99214 OFFICE O/P EST MOD 30 MIN: CPT | Performed by: INTERNAL MEDICINE

## 2019-08-19 PROCEDURE — 90632 HEPA VACCINE ADULT IM: CPT | Performed by: INTERNAL MEDICINE

## 2019-08-19 NOTE — PROGRESS NOTES
Subjective   Benjie Mcgovern is a 54 y.o. male.     Chief Complaint   Patient presents with   • Hypertension         • Atrial Fibrillation   • Hyperlipidemia       History of Present Illness   HPI: Patient is here to follow up on the blood pressure  The patient is taking the blood pressure medications as prescribed and has had no side effects. The patient is also here to follow up on the cholesterol and is trying to follow a diet. The patient is   due to get lab work done .  The patient also  Is here to follow up on atrial fibrillation and needs refills on medications .  He also needs hep a vaccine   Hyperlipidemia   Pertinent negatives include no chest pain or shortness of breath.   Hypertension   Pertinent negatives include no chest pain, palpitations or shortness of breath.    The following portions of the patient's history were reviewed and updated as appropriate: allergies, current medications, past family history, past medical history, past social history, past surgical history and problem list.    Review of Systems   Constitutional: Negative for appetite change, fatigue and fever.   HENT: Negative for congestion, ear discharge, ear pain, sinus pressure and sore throat.    Eyes: Negative for pain and discharge.   Respiratory: Negative for cough, shortness of breath and wheezing.    Cardiovascular: Negative for chest pain, palpitations and leg swelling.   Gastrointestinal: Negative for abdominal pain, constipation, diarrhea, nausea and vomiting.   Endocrine: Negative for cold intolerance and heat intolerance.   Genitourinary: Negative for dysuria and flank pain.   Musculoskeletal: Negative for back pain, myalgias and neck pain.   Skin: Negative for pallor and rash.   Allergic/Immunologic: Negative for environmental allergies and food allergies.   Neurological: Negative for dizziness, weakness and numbness.   Hematological: Negative for adenopathy. Does not bruise/bleed easily.   Psychiatric/Behavioral: Negative  "for behavioral problems and dysphoric mood. The patient is not nervous/anxious.          Current Outpatient Medications:   •  aspirin 81 MG tablet, Take 81 mg by mouth Daily., Disp: , Rfl:   •  celecoxib (CeleBREX) 200 MG capsule, Take 1 capsule by mouth 2 (Two) Times a Day., Disp: 60 capsule, Rfl: 0  •  diltiaZEM CD (CARDIZEM CD) 120 MG 24 hr capsule, TAKE 1 CAPSULE BY MOUTH EVERY NIGHT., Disp: 90 capsule, Rfl: 2  •  losartan (COZAAR) 50 MG tablet, Take 1 tablet by mouth Daily. (Patient taking differently: Take 50 mg by mouth Every Night.), Disp: 90 tablet, Rfl: 2    Objective     Blood pressure 132/84, pulse 68, temperature 97.2 °F (36.2 °C), resp. rate 17, height 182.9 cm (72\"), weight 109 kg (241 lb), SpO2 98 %.    Physical Exam   Constitutional: He is oriented to person, place, and time. He appears well-developed and well-nourished. No distress.   HENT:   Head: Normocephalic and atraumatic.   Right Ear: External ear normal.   Left Ear: External ear normal.   Nose: Nose normal.   Mouth/Throat: Oropharynx is clear and moist.   Eyes: Conjunctivae and EOM are normal. Pupils are equal, round, and reactive to light.   Neck: Normal range of motion. Neck supple. No thyromegaly present.   Cardiovascular: Normal rate, regular rhythm and normal heart sounds.   Pulmonary/Chest: Effort normal. No respiratory distress.   Abdominal: Soft. He exhibits no distension. There is no tenderness. There is no guarding.   Musculoskeletal: He exhibits no edema.   Lymphadenopathy:     He has no cervical adenopathy.   Neurological: He is alert and oriented to person, place, and time.   No gross motor or sensory deficits   Skin: Skin is warm and dry. He is not diaphoretic. No erythema.   Psychiatric: He has a normal mood and affect.   Nursing note and vitals reviewed.    Patient's Body mass index is 32.69 kg/m². BMI is above normal parameters. Recommendations include: educational material, exercise counseling and nutrition " counseling.      Results for orders placed or performed in visit on 08/19/19   Comprehensive Metabolic Panel   Result Value Ref Range    Glucose 94 65 - 99 mg/dL    BUN 16 6 - 20 mg/dL    Creatinine 0.78 0.76 - 1.27 mg/dL    eGFR Non African Am 104 >60 mL/min/1.73    eGFR African Am 126 >60 mL/min/1.73    BUN/Creatinine Ratio 20.5 7.0 - 25.0    Sodium 141 136 - 145 mmol/L    Potassium 4.3 3.5 - 5.2 mmol/L    Chloride 102 98 - 107 mmol/L    Total CO2 25.4 22.0 - 29.0 mmol/L    Calcium 9.3 8.6 - 10.5 mg/dL    Total Protein 7.0 6.0 - 8.5 g/dL    Albumin 4.40 3.50 - 5.20 g/dL    Globulin 2.6 gm/dL    A/G Ratio 1.7 g/dL    Total Bilirubin 0.5 0.2 - 1.2 mg/dL    Alkaline Phosphatase 136 (H) 39 - 117 U/L    AST (SGOT) 25 1 - 40 U/L    ALT (SGPT) 34 1 - 41 U/L   Lipid Panel   Result Value Ref Range    Total Cholesterol 188 0 - 200 mg/dL    Triglycerides 206 (H) 0 - 150 mg/dL    HDL Cholesterol 36 (L) 40 - 60 mg/dL    VLDL Cholesterol 41.2 mg/dL    LDL Cholesterol  111 (H) 0 - 100 mg/dL   Manual Differential   Result Value Ref Range    Neutrophil Rel % 58.2 42.7 - 76.0 %    Lymphocyte Rel % 29.6 19.6 - 45.3 %    Monocyte Rel % 10.2 5.0 - 12.0 %    Eosinophil Rel % 2.0 0.3 - 6.2 %    Neutrophils Absolute 5.66 1.70 - 7.00 10*3/mm3    Lymphocytes Absolute 2.88 0.70 - 3.10 10*3/mm3    Monocytes Absolute 0.99 (H) 0.10 - 0.90 10*3/mm3    Eosinophil Abs 0.19 0.00 - 0.40 10*3/mm3    Differential Comment Comment     Comment Comment     Plt Comment Comment    CBC & Differential   Result Value Ref Range    WBC 9.73 3.40 - 10.80 10*3/mm3    RBC 5.31 4.14 - 5.80 10*6/mm3    Hemoglobin 16.4 13.0 - 17.7 g/dL    Hematocrit 50.6 37.5 - 51.0 %    MCV 95.3 79.0 - 97.0 fL    MCH 30.9 26.6 - 33.0 pg    MCHC 32.4 31.5 - 35.7 g/dL    RDW 13.0 12.3 - 15.4 %    Platelets 219 140 - 450 10*3/mm3    Neutrophil Rel % CANCELED     Lymphocyte Rel % CANCELED     Monocyte Rel % CANCELED     Eosinophil Rel % CANCELED     Lymphocytes Absolute CANCELED      Eosinophils Absolute CANCELED     Basophils Absolute CANCELED          Assessment/Plan   Benjie was seen today for hypertension, atrial fibrillation and hyperlipidemia.    Diagnoses and all orders for this visit:    Benign essential hypertension    Mixed hyperlipidemia  -     CBC & Differential  -     Comprehensive Metabolic Panel  -     Lipid Panel    Chronic atrial fibrillation (CMS/HCC)    Need for hepatitis vaccination  -     Hepatitis A Vaccine Adult IM    Other orders  -     Manual Differential      Plan:  1.  Benign essential hypertension: Will continue current medication, low-sodium diet advised  2.mixed hyperlipidemia: will obtain   fasting CMP and lipid panel.  Diet and exercise counseled,  Will continue current medications  3.   Atrial fibrillation: rate controlled ,will continue current medications ,    4. Need for hepatitis a vaccine : given today              Evie Martin MD

## 2019-08-20 LAB
ALBUMIN SERPL-MCNC: 4.4 G/DL (ref 3.5–5.2)
ALBUMIN/GLOB SERPL: 1.7 G/DL
ALP SERPL-CCNC: 136 U/L (ref 39–117)
ALT SERPL-CCNC: 34 U/L (ref 1–41)
AST SERPL-CCNC: 25 U/L (ref 1–40)
BASOPHILS # BLD AUTO: (no result) 10*3/UL
BILIRUB SERPL-MCNC: 0.5 MG/DL (ref 0.2–1.2)
BUN SERPL-MCNC: 16 MG/DL (ref 6–20)
BUN/CREAT SERPL: 20.5 (ref 7–25)
CALCIUM SERPL-MCNC: 9.3 MG/DL (ref 8.6–10.5)
CHLORIDE SERPL-SCNC: 102 MMOL/L (ref 98–107)
CHOLEST SERPL-MCNC: 188 MG/DL (ref 0–200)
CO2 SERPL-SCNC: 25.4 MMOL/L (ref 22–29)
CREAT SERPL-MCNC: 0.78 MG/DL (ref 0.76–1.27)
DIFFERENTIAL COMMENT: ABNORMAL
EOSINOPHIL # BLD AUTO: (no result) 10*3/UL
EOSINOPHIL # BLD MANUAL: 0.19 10*3/MM3 (ref 0–0.4)
EOSINOPHIL NFR BLD AUTO: (no result) %
EOSINOPHIL NFR BLD MANUAL: 2 % (ref 0.3–6.2)
ERYTHROCYTE [DISTWIDTH] IN BLOOD BY AUTOMATED COUNT: 13 % (ref 12.3–15.4)
GLOBULIN SER CALC-MCNC: 2.6 GM/DL
GLUCOSE SERPL-MCNC: 94 MG/DL (ref 65–99)
HCT VFR BLD AUTO: 50.6 % (ref 37.5–51)
HDLC SERPL-MCNC: 36 MG/DL (ref 40–60)
HGB BLD-MCNC: 16.4 G/DL (ref 13–17.7)
LDLC SERPL CALC-MCNC: 111 MG/DL (ref 0–100)
LYMPHOCYTES # BLD AUTO: (no result) 10*3/UL
LYMPHOCYTES # BLD MANUAL: 2.88 10*3/MM3 (ref 0.7–3.1)
LYMPHOCYTES NFR BLD AUTO: (no result) %
LYMPHOCYTES NFR BLD MANUAL: 29.6 % (ref 19.6–45.3)
MCH RBC QN AUTO: 30.9 PG (ref 26.6–33)
MCHC RBC AUTO-ENTMCNC: 32.4 G/DL (ref 31.5–35.7)
MCV RBC AUTO: 95.3 FL (ref 79–97)
MONOCYTES # BLD MANUAL: 0.99 10*3/MM3 (ref 0.1–0.9)
MONOCYTES NFR BLD AUTO: (no result) %
MONOCYTES NFR BLD MANUAL: 10.2 % (ref 5–12)
NEUTROPHILS # BLD MANUAL: 5.66 10*3/MM3 (ref 1.7–7)
NEUTROPHILS NFR BLD AUTO: (no result) %
NEUTROPHILS NFR BLD MANUAL: 58.2 % (ref 42.7–76)
PLATELET # BLD AUTO: 219 10*3/MM3 (ref 140–450)
PLATELET BLD QL SMEAR: ABNORMAL
POTASSIUM SERPL-SCNC: 4.3 MMOL/L (ref 3.5–5.2)
PROT SERPL-MCNC: 7 G/DL (ref 6–8.5)
RBC # BLD AUTO: 5.31 10*6/MM3 (ref 4.14–5.8)
RBC MORPH BLD: ABNORMAL
SODIUM SERPL-SCNC: 141 MMOL/L (ref 136–145)
TRIGL SERPL-MCNC: 206 MG/DL (ref 0–150)
VLDLC SERPL CALC-MCNC: 41.2 MG/DL
WBC # BLD AUTO: 9.73 10*3/MM3 (ref 3.4–10.8)

## 2019-09-05 DIAGNOSIS — M17.12 PRIMARY OSTEOARTHRITIS OF LEFT KNEE: ICD-10-CM

## 2019-09-05 RX ORDER — CELECOXIB 200 MG/1
CAPSULE ORAL
Qty: 60 CAPSULE | Refills: 0 | Status: SHIPPED | OUTPATIENT
Start: 2019-09-05 | End: 2020-01-03

## 2019-09-11 RX ORDER — DILTIAZEM HYDROCHLORIDE 120 MG/1
120 CAPSULE, COATED, EXTENDED RELEASE ORAL NIGHTLY
Qty: 90 CAPSULE | Refills: 2 | Status: SHIPPED | OUTPATIENT
Start: 2019-09-11 | End: 2020-01-14 | Stop reason: SDUPTHER

## 2019-11-21 ENCOUNTER — TELEPHONE (OUTPATIENT)
Dept: ORTHOPEDIC SURGERY | Facility: CLINIC | Age: 54
End: 2019-11-21

## 2020-01-02 ENCOUNTER — TELEPHONE (OUTPATIENT)
Dept: ORTHOPEDIC SURGERY | Facility: CLINIC | Age: 55
End: 2020-01-02

## 2020-01-03 ENCOUNTER — OFFICE VISIT (OUTPATIENT)
Dept: ORTHOPEDIC SURGERY | Facility: CLINIC | Age: 55
End: 2020-01-03

## 2020-01-03 VITALS — RESPIRATION RATE: 18 BRPM | WEIGHT: 241 LBS | HEIGHT: 72 IN | BODY MASS INDEX: 32.64 KG/M2

## 2020-01-03 DIAGNOSIS — Z98.890 HX OF ARTHROSCOPY OF LEFT KNEE: ICD-10-CM

## 2020-01-03 DIAGNOSIS — M25.562 ARTHRALGIA OF LEFT KNEE: ICD-10-CM

## 2020-01-03 DIAGNOSIS — M17.12 PRIMARY OSTEOARTHRITIS OF LEFT KNEE: Primary | ICD-10-CM

## 2020-01-03 PROCEDURE — 99213 OFFICE O/P EST LOW 20 MIN: CPT | Performed by: PHYSICIAN ASSISTANT

## 2020-01-03 RX ORDER — NAPROXEN SODIUM 220 MG
220 TABLET ORAL 2 TIMES DAILY PRN
COMMUNITY
End: 2020-02-19

## 2020-01-03 NOTE — PROGRESS NOTES
"Subjective   Patient ID: Benjie Mcgovern is a 54 y.o. male  Pain of the Left Knee         History of Present Illness    Patient presents with continued left knee pain.   He has tried Celebrex, Mobic, Intra-articular cortisone injection, visco injections without pain relief. He did have a left knee ATS 9/2018  He feels the pain limits daily activities.   Denies redness or warmth to the left knee.       Past Medical History:   Diagnosis Date   • Allergy    • Arthritis    • Atrial fibrillation (CMS/HCC) 2003    REPORTS WAS DIAGNOSED IN 2003 AND THAT HE HAS HAD NO EPISODES SINCE THAT TIME   • ED (erectile dysfunction) of non-organic origin    • Elevated cholesterol     REPORTS WAS TOLD \"BORDERLINE\" BUT THAT HE HAS NEVER TAKEN MEDICATION   • Foot pain    • History of exercise stress test 2003    REPORTS WAS TOLD THAT ALL WAS WNL'S   • History of fracture     HISTORY OF LEFT WRIST AS A CHILD - HAD SURGICAL INTERVENTION   • History of kidney stones     REPORTS MULTIPLE EPISODES AND THAT HE HAS REQUIRED SURGICAL INTERVENTION IN THE PAST   • Hyperlipidemia    • Hypertension    • Wears contact lenses     INSTRUCTED TO LEAVE CONTACTS OUT THE DOS        Past Surgical History:   Procedure Laterality Date   • COLONOSCOPY N/A 8/7/2017    Procedure: COLONOSCOPY WITH BXS, POLYPECTOMY;  Surgeon: Fermín Hamilton MD;  Location: [facility] ENDOSCOPY;  Service:    • FACIAL RECONSTRUCTION SURGERY      FROM DOG BITE AS A CHILD   • FRACTURE SURGERY Left     WRIST AS  A CHILD   • KIDNEY STONE SURGERY     • KNEE ARTHROSCOPY Left 9/27/2018    Procedure: Knee diagnostic arthroscopy, left with partial medial menisectomy, removal of loose bodies and two compartment chondroplasty;  Surgeon: Dean Garcia MD;  Location: [facility] OR;  Service: Orthopedics   • KNEE SURGERY Right 2015       Family History   Problem Relation Age of Onset   • Other Other         brain tumor, renal disease   • Cancer Other    • Stroke Other    • Colon cancer Neg Hx  " "      Social History     Socioeconomic History   • Marital status:      Spouse name: Not on file   • Number of children: Not on file   • Years of education: Not on file   • Highest education level: Not on file   Tobacco Use   • Smoking status: Never Smoker   • Smokeless tobacco: Never Used   Substance and Sexual Activity   • Alcohol use: No   • Drug use: No   • Sexual activity: Defer         Current Outpatient Medications:   •  naproxen sodium (ALEVE) 220 MG tablet, Take 220 mg by mouth 2 (Two) Times a Day As Needed., Disp: , Rfl:   •  aspirin 81 MG tablet, Take 81 mg by mouth Daily., Disp: , Rfl:   •  diltiaZEM CD (CARDIZEM CD) 120 MG 24 hr capsule, TAKE 1 CAPSULE BY MOUTH EVERY NIGHT., Disp: 90 capsule, Rfl: 2  •  losartan (COZAAR) 50 MG tablet, Take 1 tablet by mouth Daily. (Patient taking differently: Take 50 mg by mouth Every Night.), Disp: 90 tablet, Rfl: 2    No Known Allergies    Review of Systems   Constitutional: Negative for diaphoresis, fever and unexpected weight change.   HENT: Negative for dental problem and sore throat.    Eyes: Negative for visual disturbance.   Respiratory: Negative for shortness of breath.    Cardiovascular: Negative for chest pain.   Gastrointestinal: Negative for abdominal pain, constipation, diarrhea, nausea and vomiting.   Genitourinary: Negative for difficulty urinating and frequency.   Musculoskeletal: Positive for arthralgias (left knee) and joint swelling.   Neurological: Negative for headaches.   Hematological: Does not bruise/bleed easily.       I have reviewed the above medical and surgical history, family history, social history, medications, allergies and review of systems.    Objective   Resp 18   Ht 182.9 cm (72\")   Wt 109 kg (241 lb)   BMI 32.69 kg/m²    Physical Exam   Constitutional: He is oriented to person, place, and time. He appears well-developed and well-nourished.   HENT:   Head: Normocephalic.   Eyes: Conjunctivae are normal.   Neck: Neck " supple. No tracheal deviation present.   Cardiovascular: Normal rate.   Pulmonary/Chest: Effort normal and breath sounds normal. No respiratory distress. He has no wheezes.   Abdominal: He exhibits no distension.   Musculoskeletal:        Left knee: He exhibits no swelling, no effusion, no ecchymosis and no erythema. Tenderness found. Medial joint line tenderness noted.   Neurological: He is alert and oriented to person, place, and time.   Psychiatric: He has a normal mood and affect. His behavior is normal.   Nursing note and vitals reviewed.    Left Knee Exam     Range of Motion   Extension: 5   Flexion: 110     Tests   Drawer:  Anterior - negative         Other   Erythema: absent  Sensation: normal  Effusion: no effusion present           Extremity DVT signs are Negative on physical exam with negative Rebekah sign, with no calf pain, with no palpable cords, with no increased pain with passive stretch/extension and with no skin tone change   Neurologic Exam     Mental Status   Oriented to person, place, and time.               Assessment/Plan   Independent Review of Radiographic Studies:    Indication to evaluate joint condition, and compared with prior images, shows evident chronic advanced osteoarthritis.  Prior MRI of left knee in 2018 revealed high grade cartilage loss medial and lateral femoral condyle.     Procedures       Benjie was seen today for pain.    Diagnoses and all orders for this visit:    Primary osteoarthritis of left knee  -     XR Knee 1 or 2 View Left    Hx of arthroscopy of left knee  -     XR Knee 1 or 2 View Left    Arthralgia of left knee  -     XR Knee 1 or 2 View Left       Discussion of orthopedic goals  Risk, benefits, and merits of treatment alternatives reviewed with the patient and questions answered  The nature of the proposed surgery reviewed with the patient including risks, benefits, rehabilitation, recovery timeframe, and outcome expectations    Recommendations/Plan:  Patient is  encouraged to call or return for any issues or concerns.    We discussed the option of TKA left knee- patient would like to consider this  Patient agreeable to call or return sooner for any concerns.               EMR Dragon-transcription disclaimer:  This encounter note is an electronic transcription of spoken language to printed text.  Electronic transcription of spoken language may permit erroneous or at times nonsensical words or phrases to be inadvertently transcribed.  Although I have reviewed the note for such errors, some may still exist

## 2020-01-14 ENCOUNTER — OFFICE VISIT (OUTPATIENT)
Dept: INTERNAL MEDICINE | Facility: CLINIC | Age: 55
End: 2020-01-14

## 2020-01-14 VITALS
SYSTOLIC BLOOD PRESSURE: 157 MMHG | OXYGEN SATURATION: 98 % | HEART RATE: 82 BPM | TEMPERATURE: 98.1 F | DIASTOLIC BLOOD PRESSURE: 90 MMHG | HEIGHT: 72 IN | BODY MASS INDEX: 32.91 KG/M2 | RESPIRATION RATE: 16 BRPM | WEIGHT: 243 LBS

## 2020-01-14 DIAGNOSIS — E78.2 MIXED HYPERLIPIDEMIA: ICD-10-CM

## 2020-01-14 DIAGNOSIS — I48.20 CHRONIC ATRIAL FIBRILLATION (HCC): ICD-10-CM

## 2020-01-14 DIAGNOSIS — I10 BENIGN ESSENTIAL HYPERTENSION: Primary | ICD-10-CM

## 2020-01-14 LAB
ALBUMIN SERPL-MCNC: 4.4 G/DL (ref 3.5–5.2)
ALBUMIN/GLOB SERPL: 1.7 G/DL
ALP SERPL-CCNC: 130 U/L (ref 39–117)
ALT SERPL-CCNC: 24 U/L (ref 1–41)
AST SERPL-CCNC: 20 U/L (ref 1–40)
BASOPHILS # BLD AUTO: 0.07 10*3/MM3 (ref 0–0.2)
BASOPHILS NFR BLD AUTO: 0.8 % (ref 0–1.5)
BILIRUB SERPL-MCNC: 0.3 MG/DL (ref 0.2–1.2)
BUN SERPL-MCNC: 18 MG/DL (ref 6–20)
BUN/CREAT SERPL: 19.8 (ref 7–25)
CALCIUM SERPL-MCNC: 9.4 MG/DL (ref 8.6–10.5)
CHLORIDE SERPL-SCNC: 102 MMOL/L (ref 98–107)
CHOLEST SERPL-MCNC: 183 MG/DL (ref 0–200)
CO2 SERPL-SCNC: 23.8 MMOL/L (ref 22–29)
CREAT SERPL-MCNC: 0.91 MG/DL (ref 0.76–1.27)
EOSINOPHIL # BLD AUTO: 0.07 10*3/MM3 (ref 0–0.4)
EOSINOPHIL NFR BLD AUTO: 0.8 % (ref 0.3–6.2)
ERYTHROCYTE [DISTWIDTH] IN BLOOD BY AUTOMATED COUNT: 12.6 % (ref 12.3–15.4)
GLOBULIN SER CALC-MCNC: 2.6 GM/DL
GLUCOSE SERPL-MCNC: 99 MG/DL (ref 65–99)
HCT VFR BLD AUTO: 45.9 % (ref 37.5–51)
HDLC SERPL-MCNC: 41 MG/DL (ref 40–60)
HGB BLD-MCNC: 16 G/DL (ref 13–17.7)
IMM GRANULOCYTES # BLD AUTO: 0.03 10*3/MM3 (ref 0–0.05)
IMM GRANULOCYTES NFR BLD AUTO: 0.4 % (ref 0–0.5)
LDLC SERPL CALC-MCNC: 114 MG/DL (ref 0–100)
LYMPHOCYTES # BLD AUTO: 2.09 10*3/MM3 (ref 0.7–3.1)
LYMPHOCYTES NFR BLD AUTO: 24.5 % (ref 19.6–45.3)
MCH RBC QN AUTO: 30.6 PG (ref 26.6–33)
MCHC RBC AUTO-ENTMCNC: 34.9 G/DL (ref 31.5–35.7)
MCV RBC AUTO: 87.8 FL (ref 79–97)
MONOCYTES # BLD AUTO: 0.88 10*3/MM3 (ref 0.1–0.9)
MONOCYTES NFR BLD AUTO: 10.3 % (ref 5–12)
NEUTROPHILS # BLD AUTO: 5.4 10*3/MM3 (ref 1.7–7)
NEUTROPHILS NFR BLD AUTO: 63.2 % (ref 42.7–76)
NRBC BLD AUTO-RTO: 0 /100 WBC (ref 0–0.2)
PLATELET # BLD AUTO: 244 10*3/MM3 (ref 140–450)
POTASSIUM SERPL-SCNC: 4.4 MMOL/L (ref 3.5–5.2)
PROT SERPL-MCNC: 7 G/DL (ref 6–8.5)
RBC # BLD AUTO: 5.23 10*6/MM3 (ref 4.14–5.8)
SODIUM SERPL-SCNC: 140 MMOL/L (ref 136–145)
TRIGL SERPL-MCNC: 139 MG/DL (ref 0–150)
VLDLC SERPL CALC-MCNC: 27.8 MG/DL
WBC # BLD AUTO: 8.54 10*3/MM3 (ref 3.4–10.8)

## 2020-01-14 PROCEDURE — 99214 OFFICE O/P EST MOD 30 MIN: CPT | Performed by: INTERNAL MEDICINE

## 2020-01-14 RX ORDER — DILTIAZEM HYDROCHLORIDE 120 MG/1
120 CAPSULE, COATED, EXTENDED RELEASE ORAL NIGHTLY
Qty: 90 CAPSULE | Refills: 2 | Status: SHIPPED | OUTPATIENT
Start: 2020-01-14 | End: 2020-11-17 | Stop reason: SDUPTHER

## 2020-01-14 RX ORDER — LOSARTAN POTASSIUM 50 MG/1
50 TABLET ORAL DAILY
Qty: 90 TABLET | Refills: 2 | Status: ON HOLD | OUTPATIENT
Start: 2020-01-14 | End: 2020-03-05 | Stop reason: SDUPTHER

## 2020-01-14 NOTE — PROGRESS NOTES
Subjective   Benjie Mcgovern is a 54 y.o. male.     Chief Complaint   Patient presents with   • Hypertension   • Hyperlipidemia   • Atrial Fibrillation       History of Present Illness   HPI: Patient is here to follow up on the blood pressure  The patient is taking the blood pressure medications as prescribed and has had no side effects. The patient is also here to follow up on the cholesterol and is trying to follow a diet. The patient is   due to get lab work done .  The patient also complains of atrial fibrillation and  needs refills on medications .   Hyperlipidemia   Pertinent negatives include no chest pain or shortness of breath.   Hypertension   Pertinent negatives include no chest pain, palpitations or shortness of breath.    The following portions of the patient's history were reviewed and updated as appropriate: allergies, current medications, past family history, past medical history, past social history, past surgical history and problem list.    Review of Systems   Constitutional: Negative for appetite change, fatigue and fever.   HENT: Negative for congestion, ear discharge, ear pain, sinus pressure and sore throat.    Eyes: Negative for pain and discharge.   Respiratory: Negative for cough, shortness of breath and wheezing.    Cardiovascular: Negative for chest pain, palpitations and leg swelling.   Gastrointestinal: Negative for abdominal pain, constipation, diarrhea, nausea and vomiting.   Endocrine: Negative for cold intolerance and heat intolerance.   Genitourinary: Negative for dysuria and flank pain.   Musculoskeletal: Negative for arthralgias and joint swelling.   Skin: Negative for pallor and rash.   Allergic/Immunologic: Negative for environmental allergies and food allergies.   Neurological: Negative for dizziness, weakness and numbness.   Hematological: Negative for adenopathy. Does not bruise/bleed easily.   Psychiatric/Behavioral: Negative for behavioral problems and dysphoric mood. The  "patient is not nervous/anxious.          Current Outpatient Medications:   •  aspirin 81 MG tablet, Take 81 mg by mouth Daily., Disp: , Rfl:   •  dilTIAZem CD (CARDIZEM CD) 120 MG 24 hr capsule, Take 1 capsule by mouth Every Night., Disp: 90 capsule, Rfl: 2  •  losartan (COZAAR) 50 MG tablet, Take 1 tablet by mouth Daily., Disp: 90 tablet, Rfl: 2  •  naproxen sodium (ALEVE) 220 MG tablet, Take 220 mg by mouth 2 (Two) Times a Day As Needed., Disp: , Rfl:     Objective     Blood pressure 157/90, pulse 82, temperature 98.1 °F (36.7 °C), resp. rate 16, height 182.9 cm (72.01\"), weight 110 kg (243 lb), SpO2 98 %.    Physical Exam   Constitutional: He is oriented to person, place, and time. He appears well-developed and well-nourished. No distress.   HENT:   Head: Normocephalic and atraumatic.   Right Ear: External ear normal.   Left Ear: External ear normal.   Nose: Nose normal.   Mouth/Throat: Oropharynx is clear and moist.   Eyes: Pupils are equal, round, and reactive to light. Conjunctivae and EOM are normal.   Neck: Normal range of motion. Neck supple. No thyromegaly present.   Cardiovascular: Normal rate, regular rhythm and normal heart sounds.   Pulmonary/Chest: Effort normal. No respiratory distress.   Abdominal: Soft. He exhibits no distension. There is no tenderness. There is no guarding.   Musculoskeletal: Normal range of motion. He exhibits no edema.   Lymphadenopathy:     He has no cervical adenopathy.   Neurological: He is alert and oriented to person, place, and time.   No gross motor or sensory deficits   Skin: Skin is warm and dry. He is not diaphoretic. No erythema.   Psychiatric: He has a normal mood and affect.   Nursing note and vitals reviewed.    Patient's Body mass index is 32.95 kg/m². BMI is above normal parameters. Recommendations include: educational material, exercise counseling and nutrition counseling.      Results for orders placed or performed in visit on 08/19/19   Comprehensive Metabolic " Panel   Result Value Ref Range    Glucose 94 65 - 99 mg/dL    BUN 16 6 - 20 mg/dL    Creatinine 0.78 0.76 - 1.27 mg/dL    eGFR Non African Am 104 >60 mL/min/1.73    eGFR African Am 126 >60 mL/min/1.73    BUN/Creatinine Ratio 20.5 7.0 - 25.0    Sodium 141 136 - 145 mmol/L    Potassium 4.3 3.5 - 5.2 mmol/L    Chloride 102 98 - 107 mmol/L    Total CO2 25.4 22.0 - 29.0 mmol/L    Calcium 9.3 8.6 - 10.5 mg/dL    Total Protein 7.0 6.0 - 8.5 g/dL    Albumin 4.40 3.50 - 5.20 g/dL    Globulin 2.6 gm/dL    A/G Ratio 1.7 g/dL    Total Bilirubin 0.5 0.2 - 1.2 mg/dL    Alkaline Phosphatase 136 (H) 39 - 117 U/L    AST (SGOT) 25 1 - 40 U/L    ALT (SGPT) 34 1 - 41 U/L   Lipid Panel   Result Value Ref Range    Total Cholesterol 188 0 - 200 mg/dL    Triglycerides 206 (H) 0 - 150 mg/dL    HDL Cholesterol 36 (L) 40 - 60 mg/dL    VLDL Cholesterol 41.2 mg/dL    LDL Cholesterol  111 (H) 0 - 100 mg/dL   Manual Differential   Result Value Ref Range    Neutrophil Rel % 58.2 42.7 - 76.0 %    Lymphocyte Rel % 29.6 19.6 - 45.3 %    Monocyte Rel % 10.2 5.0 - 12.0 %    Eosinophil Rel % 2.0 0.3 - 6.2 %    Neutrophils Absolute 5.66 1.70 - 7.00 10*3/mm3    Lymphocytes Absolute 2.88 0.70 - 3.10 10*3/mm3    Monocytes Absolute 0.99 (H) 0.10 - 0.90 10*3/mm3    Eosinophil Abs 0.19 0.00 - 0.40 10*3/mm3    Differential Comment Comment     Comment Comment     Plt Comment Comment    CBC & Differential   Result Value Ref Range    WBC 9.73 3.40 - 10.80 10*3/mm3    RBC 5.31 4.14 - 5.80 10*6/mm3    Hemoglobin 16.4 13.0 - 17.7 g/dL    Hematocrit 50.6 37.5 - 51.0 %    MCV 95.3 79.0 - 97.0 fL    MCH 30.9 26.6 - 33.0 pg    MCHC 32.4 31.5 - 35.7 g/dL    RDW 13.0 12.3 - 15.4 %    Platelets 219 140 - 450 10*3/mm3    Neutrophil Rel % CANCELED     Lymphocyte Rel % CANCELED     Monocyte Rel % CANCELED     Eosinophil Rel % CANCELED     Lymphocytes Absolute CANCELED     Eosinophils Absolute CANCELED     Basophils Absolute CANCELED          Assessment/Plan   Benjie was seen  today for hypertension, hyperlipidemia and atrial fibrillation.    Diagnoses and all orders for this visit:    Benign essential hypertension    Mixed hyperlipidemia  -     CBC & Differential  -     Comprehensive Metabolic Panel  -     Lipid Panel    Chronic atrial fibrillation    Other orders  -     dilTIAZem CD (CARDIZEM CD) 120 MG 24 hr capsule; Take 1 capsule by mouth Every Night.  -     losartan (COZAAR) 50 MG tablet; Take 1 tablet by mouth Daily.      Plan:  1.  Benign essential hypertension: Will continue current medication, low-sodium diet advised  2.mixed hyperlipidemia: will obtain   fasting CMP and lipid panel.  Diet and exercise counseled,    3.Atrial fibrillation: rate controlled, will continue current medications ,              Evie Martin MD

## 2020-02-13 ENCOUNTER — PREP FOR SURGERY (OUTPATIENT)
Dept: OTHER | Facility: HOSPITAL | Age: 55
End: 2020-02-13

## 2020-02-13 DIAGNOSIS — M17.12 PRIMARY OSTEOARTHRITIS OF LEFT KNEE: Primary | ICD-10-CM

## 2020-02-13 DIAGNOSIS — M25.562 CHRONIC PAIN OF LEFT KNEE: ICD-10-CM

## 2020-02-13 DIAGNOSIS — G89.29 CHRONIC PAIN OF LEFT KNEE: ICD-10-CM

## 2020-02-13 PROBLEM — M17.11 PRIMARY OSTEOARTHRITIS OF RIGHT KNEE: Status: ACTIVE | Noted: 2020-02-13

## 2020-02-13 PROBLEM — M25.561 CHRONIC PAIN OF RIGHT KNEE: Status: ACTIVE | Noted: 2020-02-13

## 2020-02-13 RX ORDER — CEFAZOLIN SODIUM 2 G/50ML
2 SOLUTION INTRAVENOUS
Status: CANCELLED | OUTPATIENT
Start: 2020-02-14 | End: 2020-02-15

## 2020-02-19 ENCOUNTER — OFFICE VISIT (OUTPATIENT)
Dept: ORTHOPEDIC SURGERY | Facility: CLINIC | Age: 55
End: 2020-02-19

## 2020-02-19 ENCOUNTER — HOSPITAL ENCOUNTER (OUTPATIENT)
Dept: GENERAL RADIOLOGY | Facility: HOSPITAL | Age: 55
Discharge: HOME | End: 2020-02-19
Admitting: PHYSICIAN ASSISTANT

## 2020-02-19 ENCOUNTER — APPOINTMENT (OUTPATIENT)
Dept: PREADMISSION TESTING | Facility: HOSPITAL | Age: 55
End: 2020-02-19

## 2020-02-19 VITALS — WEIGHT: 240 LBS | HEIGHT: 72 IN | RESPIRATION RATE: 18 BRPM | BODY MASS INDEX: 32.51 KG/M2

## 2020-02-19 VITALS
WEIGHT: 240.13 LBS | HEART RATE: 75 BPM | HEIGHT: 72 IN | SYSTOLIC BLOOD PRESSURE: 138 MMHG | DIASTOLIC BLOOD PRESSURE: 89 MMHG | OXYGEN SATURATION: 97 % | BODY MASS INDEX: 32.52 KG/M2

## 2020-02-19 DIAGNOSIS — M17.12 PRIMARY OSTEOARTHRITIS OF LEFT KNEE: Primary | ICD-10-CM

## 2020-02-19 DIAGNOSIS — M25.562 ARTHRALGIA OF LEFT KNEE: ICD-10-CM

## 2020-02-19 DIAGNOSIS — Z98.890 HX OF ARTHROSCOPY OF LEFT KNEE: ICD-10-CM

## 2020-02-19 DIAGNOSIS — M25.562 CHRONIC PAIN OF LEFT KNEE: ICD-10-CM

## 2020-02-19 DIAGNOSIS — M22.42 CHONDROMALACIA, PATELLA, LEFT: ICD-10-CM

## 2020-02-19 DIAGNOSIS — M17.12 PRIMARY OSTEOARTHRITIS OF LEFT KNEE: ICD-10-CM

## 2020-02-19 DIAGNOSIS — G89.29 CHRONIC PAIN OF LEFT KNEE: ICD-10-CM

## 2020-02-19 LAB
ABO GROUP BLD: NORMAL
ALBUMIN SERPL-MCNC: 4.6 G/DL (ref 3.5–5.2)
ALBUMIN/GLOB SERPL: 1.5 G/DL
ALP SERPL-CCNC: 131 U/L (ref 39–117)
ALT SERPL W P-5'-P-CCNC: 22 U/L (ref 1–41)
ANION GAP SERPL CALCULATED.3IONS-SCNC: 12.4 MMOL/L (ref 5–15)
APTT PPP: 28.1 SECONDS (ref 24.5–37.2)
AST SERPL-CCNC: 20 U/L (ref 1–40)
BASOPHILS # BLD AUTO: 0.07 10*3/MM3 (ref 0–0.2)
BASOPHILS NFR BLD AUTO: 0.7 % (ref 0–1.5)
BILIRUB SERPL-MCNC: 0.3 MG/DL (ref 0.2–1.2)
BILIRUB UR QL STRIP: NEGATIVE
BUN BLD-MCNC: 19 MG/DL (ref 6–20)
BUN/CREAT SERPL: 24.1 (ref 7–25)
CALCIUM SPEC-SCNC: 9.6 MG/DL (ref 8.6–10.5)
CHLORIDE SERPL-SCNC: 104 MMOL/L (ref 98–107)
CLARITY UR: CLEAR
CO2 SERPL-SCNC: 22.6 MMOL/L (ref 22–29)
COLOR UR: YELLOW
CREAT BLD-MCNC: 0.79 MG/DL (ref 0.76–1.27)
DEPRECATED RDW RBC AUTO: 42.1 FL (ref 37–54)
EOSINOPHIL # BLD AUTO: 0.13 10*3/MM3 (ref 0–0.4)
EOSINOPHIL NFR BLD AUTO: 1.3 % (ref 0.3–6.2)
ERYTHROCYTE [DISTWIDTH] IN BLOOD BY AUTOMATED COUNT: 12.5 % (ref 12.3–15.4)
GFR SERPL CREATININE-BSD FRML MDRD: 103 ML/MIN/1.73
GLOBULIN UR ELPH-MCNC: 3 GM/DL
GLUCOSE BLD-MCNC: 99 MG/DL (ref 65–99)
GLUCOSE UR STRIP-MCNC: NEGATIVE MG/DL
HBA1C MFR BLD: 5.3 % (ref 4.8–5.6)
HCT VFR BLD AUTO: 49.5 % (ref 37.5–51)
HGB BLD-MCNC: 16.6 G/DL (ref 13–17.7)
HGB UR QL STRIP.AUTO: NEGATIVE
IMM GRANULOCYTES # BLD AUTO: 0.03 10*3/MM3 (ref 0–0.05)
IMM GRANULOCYTES NFR BLD AUTO: 0.3 % (ref 0–0.5)
INR PPP: 0.99 (ref 0.9–1.1)
KETONES UR QL STRIP: NEGATIVE
LEUKOCYTE ESTERASE UR QL STRIP.AUTO: NEGATIVE
LYMPHOCYTES # BLD AUTO: 2.57 10*3/MM3 (ref 0.7–3.1)
LYMPHOCYTES NFR BLD AUTO: 25.2 % (ref 19.6–45.3)
MCH RBC QN AUTO: 30.7 PG (ref 26.6–33)
MCHC RBC AUTO-ENTMCNC: 33.5 G/DL (ref 31.5–35.7)
MCV RBC AUTO: 91.7 FL (ref 79–97)
MONOCYTES # BLD AUTO: 0.99 10*3/MM3 (ref 0.1–0.9)
MONOCYTES NFR BLD AUTO: 9.7 % (ref 5–12)
NEUTROPHILS # BLD AUTO: 6.39 10*3/MM3 (ref 1.7–7)
NEUTROPHILS NFR BLD AUTO: 62.8 % (ref 42.7–76)
NITRITE UR QL STRIP: NEGATIVE
NRBC BLD AUTO-RTO: 0 /100 WBC (ref 0–0.2)
PH UR STRIP.AUTO: <=5 [PH] (ref 5–8)
PLATELET # BLD AUTO: 255 10*3/MM3 (ref 140–450)
PMV BLD AUTO: 10.5 FL (ref 6–12)
POTASSIUM BLD-SCNC: 4.3 MMOL/L (ref 3.5–5.2)
PROT SERPL-MCNC: 7.6 G/DL (ref 6–8.5)
PROT UR QL STRIP: NEGATIVE
PROTHROMBIN TIME: 13.5 SECONDS (ref 12–15.1)
RBC # BLD AUTO: 5.4 10*6/MM3 (ref 4.14–5.8)
RH BLD: POSITIVE
SODIUM BLD-SCNC: 139 MMOL/L (ref 136–145)
SP GR UR STRIP: 1.03 (ref 1–1.03)
UROBILINOGEN UR QL STRIP: NORMAL
WBC NRBC COR # BLD: 10.18 10*3/MM3 (ref 3.4–10.8)

## 2020-02-19 PROCEDURE — 36415 COLL VENOUS BLD VENIPUNCTURE: CPT

## 2020-02-19 PROCEDURE — 85610 PROTHROMBIN TIME: CPT | Performed by: PHYSICIAN ASSISTANT

## 2020-02-19 PROCEDURE — 83036 HEMOGLOBIN GLYCOSYLATED A1C: CPT | Performed by: PHYSICIAN ASSISTANT

## 2020-02-19 PROCEDURE — 85730 THROMBOPLASTIN TIME PARTIAL: CPT | Performed by: PHYSICIAN ASSISTANT

## 2020-02-19 PROCEDURE — 85025 COMPLETE CBC W/AUTO DIFF WBC: CPT | Performed by: PHYSICIAN ASSISTANT

## 2020-02-19 PROCEDURE — 86901 BLOOD TYPING SEROLOGIC RH(D): CPT | Performed by: PHYSICIAN ASSISTANT

## 2020-02-19 PROCEDURE — 93005 ELECTROCARDIOGRAM TRACING: CPT

## 2020-02-19 PROCEDURE — 81003 URINALYSIS AUTO W/O SCOPE: CPT | Performed by: PHYSICIAN ASSISTANT

## 2020-02-19 PROCEDURE — 86900 BLOOD TYPING SEROLOGIC ABO: CPT | Performed by: PHYSICIAN ASSISTANT

## 2020-02-19 PROCEDURE — 80053 COMPREHEN METABOLIC PANEL: CPT | Performed by: PHYSICIAN ASSISTANT

## 2020-02-19 PROCEDURE — S0260 H&P FOR SURGERY: HCPCS | Performed by: PHYSICIAN ASSISTANT

## 2020-02-19 PROCEDURE — 71046 X-RAY EXAM CHEST 2 VIEWS: CPT

## 2020-02-19 RX ORDER — ACETAMINOPHEN 500 MG
500 TABLET ORAL EVERY 6 HOURS PRN
COMMUNITY
End: 2020-03-05 | Stop reason: HOSPADM

## 2020-02-19 ASSESSMENT — KOOS JR
KOOS JR SCORE: 20
KOOS JR SCORE: 36.931

## 2020-02-19 NOTE — PROGRESS NOTES
"Benjie Mcgovern is a 54 y.o. right hand dominant male   Pain and Pre-op Exam of the Left Knee (TKA scheduled 3/3/2020)       History of Present Illness      Patient presents for a planned preop appointment regarding left total knee arthroplasty.     Patient has been experiencing left throbbing/aching knee pain since July 2018.  He initially suffered a work-related injury that warranted a left knee arthroscopy.  He was doing great after his knee scope until a new work injury occurred from a twisting mechanism.  Patient tried intra-articular cortisone, Visco supplementation injection, oral anti-inflammatories, physical therapy, and knee brace and rest without improvement.  He would like to proceed with a planned left total knee arthroplasty    PAST MEDICAL HISTORY:    Past Medical History:   Diagnosis Date   • Arthritis     Left knee   • Atrial fibrillation (CMS/HCC) 2003    REPORTS WAS DIAGNOSED IN 2003 AND THAT HE HAS HAD NO EPISODES SINCE THAT TIME   • ED (erectile dysfunction) of non-organic origin    • Elevated cholesterol     REPORTS WAS TOLD \"BORDERLINE\" BUT THAT HE HAS NEVER TAKEN MEDICATION   • Foot pain    • History of exercise stress test 2003    REPORTS WAS TOLD THAT ALL WAS WNL'S   • History of fracture     HISTORY OF LEFT WRIST AS A CHILD - HAD SURGICAL INTERVENTION   • History of kidney stones     REPORTS MULTIPLE EPISODES AND THAT HE HAS REQUIRED SURGICAL INTERVENTION IN THE PAST   • Hyperlipidemia    • Hypertension    • Wears contact lenses     INSTRUCTED TO LEAVE CONTACTS OUT THE DOS         Current Outpatient Medications:   •  acetaminophen (TYLENOL) 500 MG tablet, Take 500 mg by mouth Every 6 (Six) Hours As Needed for Mild Pain ., Disp: , Rfl:   •  aspirin 81 MG tablet, Take 81 mg by mouth Daily., Disp: , Rfl:   •  dilTIAZem CD (CARDIZEM CD) 120 MG 24 hr capsule, Take 1 capsule by mouth Every Night., Disp: 90 capsule, Rfl: 2  •  losartan (COZAAR) 50 MG tablet, Take 1 tablet by mouth Daily., Disp: 90 " tablet, Rfl: 2    Past Surgical History:   Procedure Laterality Date   • COLONOSCOPY N/A 8/7/2017    Procedure: COLONOSCOPY WITH BXS, POLYPECTOMY;  Surgeon: Fermín Hamilton MD;  Location: [facility] ENDOSCOPY;  Service:    • FACIAL RECONSTRUCTION SURGERY      FROM DOG BITE AS A CHILD   • FRACTURE SURGERY Left     WRIST AS  A CHILD   • KIDNEY STONE SURGERY     • KNEE ARTHROSCOPY Left 9/27/2018    Procedure: Knee diagnostic arthroscopy, left with partial medial menisectomy, removal of loose bodies and two compartment chondroplasty;  Surgeon: Dean Garcia MD;  Location: [facility] OR;  Service: Orthopedics   • KNEE SURGERY Right 2015       Family History   Problem Relation Age of Onset   • Other Other         brain tumor, renal disease   • Cancer Other    • Stroke Other    • Colon cancer Neg Hx        Social History     Socioeconomic History   • Marital status:      Spouse name: Not on file   • Number of children: Not on file   • Years of education: Not on file   • Highest education level: Not on file   Occupational History   • Occupation:      Employer: [email or web address] [place]     Comment: since 1996   Tobacco Use   • Smoking status: Never Smoker   • Smokeless tobacco: Never Used   Substance and Sexual Activity   • Alcohol use: No   • Drug use: No   • Sexual activity: Defer        No Known Allergies    Review of Systems   Constitutional: Negative for diaphoresis, fever and unexpected weight change.   HENT: Negative for dental problem and sore throat.    Eyes: Negative for visual disturbance.   Respiratory: Negative for shortness of breath.    Cardiovascular: Negative for chest pain.   Gastrointestinal: Negative for abdominal pain, constipation, diarrhea, nausea and vomiting.   Genitourinary: Negative for difficulty urinating and frequency.   Musculoskeletal: Positive for arthralgias (left knee).   Neurological: Negative for headaches.   Hematological: Does not bruise/bleed easily.       I  "have reviewed the medical and surgical history, family history, social history, medications, and/or allergies, and the review of systems of this report.    PHYSICAL EXAMINATION:       Resp 18   Ht 182.9 cm (72\")   Wt 109 kg (240 lb)   BMI 32.55 kg/m²     GENERAL [x] Well developed  []Ill appearing [x] No acute distress    HEENT [x]No acute changes [x] Normocephalic, atraumatic    NECK [x]Supple  [] No midline tenderness    LUNGS [x]Clear bilaterally [x]No wheezes []Rhonchi [] Rales    HEART [x] Regular rate  [x] Regular rhythm [] Irregular    ABDOMEN [x] Soft [x] Not tender [x] Not distended [x] Normal sounds    VAS/EXT [x] Normal Pulses []Edema []Cyanosis             SKIN [x] Warm [x]Dry []Pink []Ecchymosis []Cool    NEURO [x] Sensation Intact [x] Motor Intact [x] Pulse intact  [] Dysesthesias:_________  []Weakness:__________             Physical Exam   Constitutional: He appears well-developed and well-nourished.   Nursing note and vitals reviewed.    Left Knee Exam     Range of Motion   Extension: 5   Flexion: 90     Tests   Drawer:  Anterior - negative         Other   Erythema: absent  Scars: present  Sensation: normal  Pulse: present          Extremity DVT signs are negative on physical exam with negative Rebekah sign, no calf pain, no palpable cords and no skin tone change   Neurologic Exam    + Patella crepitus left knee    DVT Screening: No Yes   Smoker [x] []   Personal/Family History of DVT or PE [x] []   Sedentary Lifestyle [x] []   BMI >30 [] [x]      Tranexamic acid TXA criteria for usage during arthroplasty surgery.    Previous or Active DVT/PE: NO  Cardiac Stent within 1 year: NO  Embolic/Ischemic stroke within 1 year: NO  Creatinine less than 30ml/min: NO  Congenital Thrombophilia: NO  Hypersensitivity to TXA: NO  Color Blindness: NO  NOTE:  TXA  tranexemic acid summary: THIS PATIENT IS A CANDIDATE FOR IV TXA DURING SURGERY.     Independent Review of Radiographic Studies:    Reviewed imaging with " patient at a prior visit.  Laboratory and Other Studies:  No new results reviewed today.   Medical Decision Making:    Stable neurovascular exam.    MRI from 12/3/2018 did reveal high-grade cartilage loss on the medial femoral condyle and medial tibial plateau.  There was also noted high-grade cartilage loss on the lateral femoral condyle        Benjie was seen today for pain and pre-op exam.    Diagnoses and all orders for this visit:    Primary osteoarthritis of left knee    Hx of arthroscopy of left knee    Arthralgia of left knee    Chondromalacia, patella, left         SPECIAL INSTRUCTIONS:  Multi-modal analgesia.  Tranexamic acid IV protocol (see screening parameters this report).  Multi-modal DVT prophylaxis.  Rehabilitation PT/OT protocol with same day walker ambulation with therapist.      Risks, benefits, and alternative treatments discussed with the patient: [x] Yes [] No    Risk benefits and merits of the proposed surgery were discussed and the patient's questions were answered risks discussed including and not limited to:  Anesthesia reactions  Blood loss and possible transfusion  Infection  Deep venous thrombosis and pulmonary embolus  Nerve, vascular or tendon injury  Fracture  Deformity  Stiffness  Weakness  Prosthesis and implant issues such as loosening, material wear or dislocation  Skin necrosis  Revision surgery or further treatment  Recurrence of problem and condition     Informed consent: [] Signed  [x] To be obtained at hospital  [] Both    Recommendations/Plan:  The nature of the proposed surgery reviewed with the patient including risks, benefits, rehabilitation, recovery timeframe, and outcome expectations  The nature of the proposed surgery reviewed with the patient including risks, benefits, rehabilitation, recovery timeframe, and outcome expectations          PLANNED SURGICAL PROCEDURE: Elective left total knee arthroplasty    Patient is encouraged and agreeable to call or return sooner  for any issues or concerns.

## 2020-03-03 ENCOUNTER — ANESTHESIA (OUTPATIENT)
Dept: PERIOP | Facility: HOSPITAL | Age: 55
End: 2020-03-03

## 2020-03-03 ENCOUNTER — ANESTHESIA EVENT (OUTPATIENT)
Dept: PERIOP | Facility: HOSPITAL | Age: 55
End: 2020-03-03

## 2020-03-03 ENCOUNTER — APPOINTMENT (OUTPATIENT)
Dept: GENERAL RADIOLOGY | Facility: HOSPITAL | Age: 55
End: 2020-03-03

## 2020-03-03 ENCOUNTER — HOSPITAL ENCOUNTER (OUTPATIENT)
Facility: HOSPITAL | Age: 55
Discharge: HOME HEALTH | End: 2020-03-05
Attending: ORTHOPAEDIC SURGERY | Admitting: ORTHOPAEDIC SURGERY

## 2020-03-03 DIAGNOSIS — I82.442 ACUTE DEEP VEIN THROMBOSIS (DVT) OF TIBIAL VEIN OF LEFT LOWER EXTREMITY (HCC): ICD-10-CM

## 2020-03-03 DIAGNOSIS — Z74.09 IMPAIRED MOBILITY AND ADLS: Primary | ICD-10-CM

## 2020-03-03 DIAGNOSIS — M25.562 CHRONIC PAIN OF LEFT KNEE: ICD-10-CM

## 2020-03-03 DIAGNOSIS — Z78.9 IMPAIRED MOBILITY AND ADLS: Primary | ICD-10-CM

## 2020-03-03 DIAGNOSIS — I47.1 SVT (SUPRAVENTRICULAR TACHYCARDIA) (HCC): ICD-10-CM

## 2020-03-03 DIAGNOSIS — G89.29 CHRONIC PAIN OF LEFT KNEE: ICD-10-CM

## 2020-03-03 DIAGNOSIS — M17.12 PRIMARY OSTEOARTHRITIS OF LEFT KNEE: ICD-10-CM

## 2020-03-03 PROBLEM — I48.0 PAROXYSMAL ATRIAL FIBRILLATION (HCC): Status: ACTIVE | Noted: 2017-04-20

## 2020-03-03 PROBLEM — Z96.652 STATUS POST TOTAL KNEE REPLACEMENT USING CEMENT, LEFT: Status: ACTIVE | Noted: 2020-03-03

## 2020-03-03 LAB
ABO GROUP BLD: NORMAL
BLD GP AB SCN SERPL QL: NEGATIVE
RH BLD: POSITIVE
T&S EXPIRATION DATE: NORMAL

## 2020-03-03 PROCEDURE — 25010000003 CEFAZOLIN SODIUM-DEXTROSE 2-3 GM-%(50ML) RECONSTITUTED SOLUTION: Performed by: PHYSICIAN ASSISTANT

## 2020-03-03 PROCEDURE — 86850 RBC ANTIBODY SCREEN: CPT | Performed by: PHYSICIAN ASSISTANT

## 2020-03-03 PROCEDURE — G0378 HOSPITAL OBSERVATION PER HR: HCPCS

## 2020-03-03 PROCEDURE — 73560 X-RAY EXAM OF KNEE 1 OR 2: CPT

## 2020-03-03 PROCEDURE — 25010000003 CEFAZOLIN SODIUM-DEXTROSE 1-4 GM-%(50ML) RECONSTITUTED SOLUTION: Performed by: ORTHOPAEDIC SURGERY

## 2020-03-03 PROCEDURE — 99204 OFFICE O/P NEW MOD 45 MIN: CPT | Performed by: INTERNAL MEDICINE

## 2020-03-03 PROCEDURE — 86901 BLOOD TYPING SEROLOGIC RH(D): CPT | Performed by: PHYSICIAN ASSISTANT

## 2020-03-03 PROCEDURE — 27447 TOTAL KNEE ARTHROPLASTY: CPT | Performed by: ORTHOPAEDIC SURGERY

## 2020-03-03 PROCEDURE — C1713 ANCHOR/SCREW BN/BN,TIS/BN: HCPCS | Performed by: ORTHOPAEDIC SURGERY

## 2020-03-03 PROCEDURE — 25010000002 FENTANYL CITRATE (PF) 100 MCG/2ML SOLUTION: Performed by: NURSE ANESTHETIST, CERTIFIED REGISTERED

## 2020-03-03 PROCEDURE — 25010000003 CEFAZOLIN PER 500 MG: Performed by: ORTHOPAEDIC SURGERY

## 2020-03-03 PROCEDURE — C1776 JOINT DEVICE (IMPLANTABLE): HCPCS | Performed by: ORTHOPAEDIC SURGERY

## 2020-03-03 PROCEDURE — 97161 PT EVAL LOW COMPLEX 20 MIN: CPT

## 2020-03-03 PROCEDURE — 25010000002 PROPOFOL 1000 MG/100ML EMULSION: Performed by: NURSE ANESTHETIST, CERTIFIED REGISTERED

## 2020-03-03 PROCEDURE — 25010000002 MIDAZOLAM PER 1 MG: Performed by: NURSE ANESTHETIST, CERTIFIED REGISTERED

## 2020-03-03 PROCEDURE — 86900 BLOOD TYPING SEROLOGIC ABO: CPT | Performed by: PHYSICIAN ASSISTANT

## 2020-03-03 DEVICE — IMPLANTABLE DEVICE: Type: IMPLANTABLE DEVICE | Status: FUNCTIONAL

## 2020-03-03 DEVICE — LEGION HIGHLY CROSS LINKED                                    POLYETHYLENE CRUCIATE RETAINING                                    INSERT SIZE 5-6 11MM
Type: IMPLANTABLE DEVICE | Site: KNEE | Status: FUNCTIONAL
Brand: LEGION

## 2020-03-03 DEVICE — GENESIS II RESURFACING PATELLAR                                    PROSTHESIS  32MM
Type: IMPLANTABLE DEVICE | Site: KNEE | Status: FUNCTIONAL
Brand: GENESIS II

## 2020-03-03 DEVICE — GENESIS II NON-POROUS TIBIAL                                    BASEPLATE SIZE 6 LT
Type: IMPLANTABLE DEVICE | Site: KNEE | Status: FUNCTIONAL
Brand: GENESIS II

## 2020-03-03 DEVICE — LEGION CRUCIATE RETAINING OXINIUM                                    FEMORAL SIZE 6 LEFT
Type: IMPLANTABLE DEVICE | Site: KNEE | Status: FUNCTIONAL
Brand: LEGION

## 2020-03-03 DEVICE — CMT BONE SIMPLEX/P FULL DOSE 10/PK: Type: IMPLANTABLE DEVICE | Site: KNEE | Status: FUNCTIONAL

## 2020-03-03 RX ORDER — ASPIRIN 81 MG/1
81 TABLET ORAL DAILY
Status: DISCONTINUED | OUTPATIENT
Start: 2020-03-03 | End: 2020-03-05 | Stop reason: HOSPADM

## 2020-03-03 RX ORDER — BUPIVACAINE HCL/0.9 % NACL/PF 0.125 %
4-14 PREFILLED PUMP RESERVOIR EPIDURAL CONTINUOUS
Status: DISCONTINUED | OUTPATIENT
Start: 2020-03-03 | End: 2020-03-05 | Stop reason: HOSPADM

## 2020-03-03 RX ORDER — BISACODYL 10 MG
10 SUPPOSITORY, RECTAL RECTAL DAILY PRN
Status: DISCONTINUED | OUTPATIENT
Start: 2020-03-03 | End: 2020-03-05 | Stop reason: HOSPADM

## 2020-03-03 RX ORDER — ONDANSETRON 2 MG/ML
4 INJECTION INTRAMUSCULAR; INTRAVENOUS EVERY 6 HOURS PRN
Status: DISCONTINUED | OUTPATIENT
Start: 2020-03-03 | End: 2020-03-05 | Stop reason: HOSPADM

## 2020-03-03 RX ORDER — BUPIVACAINE HYDROCHLORIDE 2.5 MG/ML
INJECTION, SOLUTION EPIDURAL; INFILTRATION; INTRACAUDAL
Status: DISPENSED
Start: 2020-03-03 | End: 2020-03-04

## 2020-03-03 RX ORDER — ONDANSETRON 4 MG/1
4 TABLET, FILM COATED ORAL EVERY 6 HOURS PRN
Status: DISCONTINUED | OUTPATIENT
Start: 2020-03-03 | End: 2020-03-05 | Stop reason: HOSPADM

## 2020-03-03 RX ORDER — SODIUM CHLORIDE, SODIUM LACTATE, POTASSIUM CHLORIDE, CALCIUM CHLORIDE 600; 310; 30; 20 MG/100ML; MG/100ML; MG/100ML; MG/100ML
100 INJECTION, SOLUTION INTRAVENOUS CONTINUOUS
Status: DISCONTINUED | OUTPATIENT
Start: 2020-03-03 | End: 2020-03-04

## 2020-03-03 RX ORDER — SODIUM CHLORIDE, SODIUM LACTATE, POTASSIUM CHLORIDE, CALCIUM CHLORIDE 600; 310; 30; 20 MG/100ML; MG/100ML; MG/100ML; MG/100ML
1000 INJECTION, SOLUTION INTRAVENOUS CONTINUOUS
Status: DISCONTINUED | OUTPATIENT
Start: 2020-03-03 | End: 2020-03-03

## 2020-03-03 RX ORDER — DILTIAZEM HYDROCHLORIDE 120 MG/1
120 CAPSULE, COATED, EXTENDED RELEASE ORAL NIGHTLY
Status: DISCONTINUED | OUTPATIENT
Start: 2020-03-03 | End: 2020-03-05 | Stop reason: HOSPADM

## 2020-03-03 RX ORDER — PROPOFOL 10 MG/ML
INJECTION, EMULSION INTRAVENOUS CONTINUOUS PRN
Status: DISCONTINUED | OUTPATIENT
Start: 2020-03-03 | End: 2020-03-03 | Stop reason: SURG

## 2020-03-03 RX ORDER — LOSARTAN POTASSIUM 50 MG/1
50 TABLET ORAL DAILY
Status: DISCONTINUED | OUTPATIENT
Start: 2020-03-04 | End: 2020-03-05 | Stop reason: HOSPADM

## 2020-03-03 RX ORDER — CEFAZOLIN SODIUM 1 G/50ML
1 SOLUTION INTRAVENOUS EVERY 8 HOURS
Status: COMPLETED | OUTPATIENT
Start: 2020-03-03 | End: 2020-03-04

## 2020-03-03 RX ORDER — BUPIVACAINE HYDROCHLORIDE 5 MG/ML
INJECTION, SOLUTION EPIDURAL; INTRACAUDAL
Status: DISCONTINUED | OUTPATIENT
Start: 2020-03-03 | End: 2020-03-03 | Stop reason: SURG

## 2020-03-03 RX ORDER — BUPIVACAINE HYDROCHLORIDE 7.5 MG/ML
INJECTION, SOLUTION EPIDURAL; RETROBULBAR
Status: COMPLETED | OUTPATIENT
Start: 2020-03-03 | End: 2020-03-03

## 2020-03-03 RX ORDER — CEFAZOLIN SODIUM 2 G/50ML
2 SOLUTION INTRAVENOUS ONCE
Status: COMPLETED | OUTPATIENT
Start: 2020-03-03 | End: 2020-03-03

## 2020-03-03 RX ORDER — MIDAZOLAM HYDROCHLORIDE 1 MG/ML
INJECTION INTRAMUSCULAR; INTRAVENOUS AS NEEDED
Status: DISCONTINUED | OUTPATIENT
Start: 2020-03-03 | End: 2020-03-03 | Stop reason: SURG

## 2020-03-03 RX ORDER — SODIUM CHLORIDE 0.9 % (FLUSH) 0.9 %
10 SYRINGE (ML) INJECTION AS NEEDED
Status: DISCONTINUED | OUTPATIENT
Start: 2020-03-03 | End: 2020-03-03 | Stop reason: HOSPADM

## 2020-03-03 RX ORDER — SODIUM CHLORIDE 0.9 % (FLUSH) 0.9 %
3 SYRINGE (ML) INJECTION EVERY 12 HOURS SCHEDULED
Status: DISCONTINUED | OUTPATIENT
Start: 2020-03-03 | End: 2020-03-05 | Stop reason: HOSPADM

## 2020-03-03 RX ORDER — FONDAPARINUX SODIUM 2.5 MG/.5ML
2.5 INJECTION SUBCUTANEOUS
Status: DISCONTINUED | OUTPATIENT
Start: 2020-03-04 | End: 2020-03-04

## 2020-03-03 RX ORDER — KETAMINE HCL IN NACL, ISO-OSM 100MG/10ML
SYRINGE (ML) INJECTION AS NEEDED
Status: DISCONTINUED | OUTPATIENT
Start: 2020-03-03 | End: 2020-03-03 | Stop reason: SURG

## 2020-03-03 RX ORDER — FENTANYL CITRATE 50 UG/ML
INJECTION, SOLUTION INTRAMUSCULAR; INTRAVENOUS AS NEEDED
Status: DISCONTINUED | OUTPATIENT
Start: 2020-03-03 | End: 2020-03-03 | Stop reason: SURG

## 2020-03-03 RX ORDER — HYDROCODONE BITARTRATE AND ACETAMINOPHEN 7.5; 325 MG/1; MG/1
1 TABLET ORAL EVERY 6 HOURS PRN
Status: DISCONTINUED | OUTPATIENT
Start: 2020-03-03 | End: 2020-03-05 | Stop reason: HOSPADM

## 2020-03-03 RX ORDER — NALOXONE HCL 0.4 MG/ML
0.1 VIAL (ML) INJECTION
Status: DISCONTINUED | OUTPATIENT
Start: 2020-03-03 | End: 2020-03-05 | Stop reason: HOSPADM

## 2020-03-03 RX ORDER — DOCUSATE SODIUM 100 MG/1
100 CAPSULE, LIQUID FILLED ORAL 2 TIMES DAILY PRN
Status: DISCONTINUED | OUTPATIENT
Start: 2020-03-03 | End: 2020-03-05 | Stop reason: HOSPADM

## 2020-03-03 RX ORDER — SODIUM CHLORIDE 0.9 % (FLUSH) 0.9 %
1-10 SYRINGE (ML) INJECTION AS NEEDED
Status: DISCONTINUED | OUTPATIENT
Start: 2020-03-03 | End: 2020-03-05 | Stop reason: HOSPADM

## 2020-03-03 RX ADMIN — SODIUM CHLORIDE, POTASSIUM CHLORIDE, SODIUM LACTATE AND CALCIUM CHLORIDE 1000 ML: 600; 310; 30; 20 INJECTION, SOLUTION INTRAVENOUS at 10:08

## 2020-03-03 RX ADMIN — SODIUM CHLORIDE, POTASSIUM CHLORIDE, SODIUM LACTATE AND CALCIUM CHLORIDE 100 ML/HR: 600; 310; 30; 20 INJECTION, SOLUTION INTRAVENOUS at 17:28

## 2020-03-03 RX ADMIN — PROPOFOL 120 MCG/KG/MIN: 10 INJECTION, EMULSION INTRAVENOUS at 11:20

## 2020-03-03 RX ADMIN — CEFAZOLIN SODIUM 2 G: 2 SOLUTION INTRAVENOUS at 11:08

## 2020-03-03 RX ADMIN — BUPIVACAINE HYDROCHLORIDE 12 ML: 5 INJECTION, SOLUTION EPIDURAL; INTRACAUDAL; PERINEURAL at 14:16

## 2020-03-03 RX ADMIN — CEFAZOLIN SODIUM 1 G: 1 SOLUTION INTRAVENOUS at 18:53

## 2020-03-03 RX ADMIN — MIDAZOLAM HYDROCHLORIDE 2 MG: 1 INJECTION, SOLUTION INTRAMUSCULAR; INTRAVENOUS at 11:07

## 2020-03-03 RX ADMIN — FAMOTIDINE 20 MG: 10 INJECTION, SOLUTION INTRAVENOUS at 10:08

## 2020-03-03 RX ADMIN — Medication 25 MG: at 11:09

## 2020-03-03 RX ADMIN — FENTANYL CITRATE 100 MCG: 50 INJECTION INTRAMUSCULAR; INTRAVENOUS at 11:07

## 2020-03-03 RX ADMIN — TRANEXAMIC ACID 1000 MG: 100 INJECTION, SOLUTION INTRAVENOUS at 11:16

## 2020-03-03 RX ADMIN — BUPIVACAINE HYDROCHLORIDE 5 ML: 5 INJECTION, SOLUTION EPIDURAL; INTRACAUDAL; PERINEURAL at 14:35

## 2020-03-03 RX ADMIN — ASPIRIN 81 MG: 81 TABLET, COATED ORAL at 18:52

## 2020-03-03 RX ADMIN — TRANEXAMIC ACID 1000 MG: 100 INJECTION, SOLUTION INTRAVENOUS at 12:33

## 2020-03-03 RX ADMIN — SODIUM CHLORIDE, POTASSIUM CHLORIDE, SODIUM LACTATE AND CALCIUM CHLORIDE: 600; 310; 30; 20 INJECTION, SOLUTION INTRAVENOUS at 13:44

## 2020-03-03 RX ADMIN — BUPIVACAINE HYDROCHLORIDE 1.8 ML: 7.5 INJECTION, SOLUTION EPIDURAL; RETROBULBAR at 11:22

## 2020-03-03 RX ADMIN — DILTIAZEM HYDROCHLORIDE 120 MG: 120 CAPSULE, COATED, EXTENDED RELEASE ORAL at 20:30

## 2020-03-03 RX ADMIN — Medication 6 ML/HR: at 14:42

## 2020-03-03 RX ADMIN — SODIUM CHLORIDE, POTASSIUM CHLORIDE, SODIUM LACTATE AND CALCIUM CHLORIDE: 600; 310; 30; 20 INJECTION, SOLUTION INTRAVENOUS at 11:35

## 2020-03-03 NOTE — ANESTHESIA PREPROCEDURE EVALUATION
Anesthesia Evaluation     Patient summary reviewed and Nursing notes reviewed   no history of anesthetic complications:  NPO Solid Status: > 8 hours  NPO Liquid Status: > 8 hours           Airway   Mallampati: I  TM distance: >3 FB  Neck ROM: full  no difficulty expected  Dental - normal exam     Pulmonary - negative pulmonary ROS and normal exam   Cardiovascular - normal exam    (+) hypertension, dysrhythmias Atrial Fib, hyperlipidemia,       Neuro/Psych  (+) psychiatric history,     GI/Hepatic/Renal/Endo - negative ROS     Musculoskeletal     Abdominal    Substance History - negative use     OB/GYN negative ob/gyn ROS         Other   arthritis,                      Anesthesia Plan    ASA 3     spinal and regional     intravenous induction     Anesthetic plan, all risks, benefits, and alternatives have been provided, discussed and informed consent has been obtained with: patient.

## 2020-03-03 NOTE — INTERVAL H&P NOTE
H&P reviewed. The patient was examined and there are no changes to the H&P, inclusive of the physical exam heart, lungs and procedure site specific exam as noted on the current (within 30 days) history and physical full detailed report.    Vitals:    03/03/20 0944   BP: 157/91   Pulse: 69   Resp: 16   Temp: 97.1 °F (36.2 °C)   SpO2: 96%       Dean Garcia MD  3/3/2020  10:20 AM

## 2020-03-03 NOTE — THERAPY EVALUATION
"Patient Name: Benjie Mcgovern  : 1965    MRN: [id number]                              Today's Date: 3/3/2020       Admit Date: 3/3/2020    Visit Dx:     ICD-10-CM ICD-9-CM   1. Primary osteoarthritis of left knee M17.12 715.16   2. Chronic pain of left knee M25.562 719.46    G89.29 338.29     Patient Active Problem List   Diagnosis   • Atrial fibrillation (CMS/HCC)   • Benign essential hypertension   • Gastroesophageal reflux disease with esophagitis   • Knee pain   • Impotence of organic origin   • Multiple-type hyperlipidemia   • Spasm   • Muscle pain   • Hand joint pain   • Pain in extremity   • Vitamin D deficiency   • Colon cancer screening   • Loose body in knee, left knee   • Primary osteoarthritis of left knee   • Chronic pain of left knee   • Status post total knee replacement using cement, left     Past Medical History:   Diagnosis Date   • Arthritis     Left knee   • Atrial fibrillation (CMS/HCC)     REPORTS WAS DIAGNOSED IN  AND THAT HE HAS HAD NO EPISODES SINCE THAT TIME   • ED (erectile dysfunction) of non-organic origin    • Elevated cholesterol     REPORTS WAS TOLD \"BORDERLINE\" BUT THAT HE HAS NEVER TAKEN MEDICATION   • Foot pain    • History of exercise stress test     REPORTS WAS TOLD THAT ALL WAS WNL'S   • History of fracture     HISTORY OF LEFT WRIST AS A CHILD - HAD SURGICAL INTERVENTION   • History of kidney stones     REPORTS MULTIPLE EPISODES AND THAT HE HAS REQUIRED SURGICAL INTERVENTION IN THE PAST   • Hyperlipidemia    • Hypertension    • Impaired functional mobility, balance, gait, and endurance    • Wears contact lenses     INSTRUCTED TO LEAVE CONTACTS OUT THE DOS     Past Surgical History:   Procedure Laterality Date   • COLONOSCOPY N/A 2017    Procedure: COLONOSCOPY WITH BXS, POLYPECTOMY;  Surgeon: Fermín Hamilton MD;  Location: [facility] ENDOSCOPY;  Service:    • FACIAL RECONSTRUCTION SURGERY      FROM DOG BITE AS A CHILD   • FRACTURE SURGERY Left     WRIST AS  A " CHILD   • KIDNEY STONE SURGERY     • KNEE ARTHROSCOPY Left 9/27/2018    Procedure: Knee diagnostic arthroscopy, left with partial medial menisectomy, removal of loose bodies and two compartment chondroplasty;  Surgeon: Dean Garcia MD;  Location: [facility];  Service: Orthopedics   • KNEE SURGERY Right 2015     General Information     Row Name 03/03/20 1644          PT Evaluation Time/Intention    Document Type  evaluation  -JR     Mode of Treatment  physical therapy  -JR     Row Name 03/03/20 1644          General Information    Patient Profile Reviewed?  yes  -JR     Prior Level of Function  independent:;community mobility  -JR     Existing Precautions/Restrictions  hip  -JR     Barriers to Rehab  none identified  -JR     Row Name 03/03/20 1644          Relationship/Environment    Lives With  spouse  -JR     Row Name 03/03/20 1644          Resource/Environmental Concerns    Current Living Arrangements  home/apartment/condo  -JR     Row Name 03/03/20 1644          Home Main Entrance    Number of Stairs, Main Entrance  four  -JR     Row Name 03/03/20 1644          Stairs Within Home, Primary    Number of Stairs, Within Home, Primary  none  -JR     Row Name 03/03/20 1644          Cognitive Assessment/Intervention- PT/OT    Orientation Status (Cognition)  oriented x 4  -JR     Row Name 03/03/20 1644          Safety Issues, Functional Mobility    Safety Issues Affecting Function (Mobility)  safety precautions follow-through/compliance  -JR     Impairments Affecting Function (Mobility)  strength;range of motion (ROM)  -JR       User Key  (r) = Recorded By, (t) = Taken By, (c) = Cosigned By    Initials Name Provider Type    JR Junie Cantu, PT Physical Therapist        Mobility     Row Name 03/03/20 1644          Bed Mobility Assessment/Treatment    Bed Mobility Assessment/Treatment  supine-sit  -JR     Supine-Sit [place] (Bed Mobility)  contact guard  -JR     Assistive Device (Bed Mobility)  head of bed  elevated;bed rails  -     Row Name 03/03/20 1644          Sit-Stand Transfer    Sit-Stand [place] (Transfers)  minimum assist (75% patient effort)  -     Assistive Device (Sit-Stand Transfers)  walker, front-wheeled  -JR     Row Name 03/03/20 1644          Gait/Stairs Assessment/Training    Gait/Stairs Assessment/Training  gait/ambulation independence;gait/ambulation assistive device  -     [place] Level (Gait)  minimum assist (75% patient effort)  -     Assistive Device (Gait)  walker, front-wheeled  -JR     Distance in Feet (Gait)  85  -JR     Pattern (Gait)  step-through  -     Deviations/Abnormal Patterns (Gait)  antalgic;jovanny decreased;base of support, wide;gait speed decreased  -       User Key  (r) = Recorded By, (t) = Taken By, (c) = Cosigned By    Initials Name Provider Type    Junie Pathak, PT Physical Therapist        Obj/Interventions     [facility] Name 03/03/20 1755          General ROM    GENERAL ROM COMMENTS  left knee=5-90  -[facility] Name 03/03/20 1755          MMT (Manual Muscle Testing)    General MMT Comments  left knee=2+/5  -[facility] Name 03/03/20 1755          Static Sitting Balance    Level of [place] (Unsupported Sitting, Static Balance)  conditional independence  -     Sitting Position (Unsupported Sitting, Static Balance)  sitting on edge of bed  -     Time Able to Maintain Position (Unsupported Sitting, Static Balance)  2 to 3 minutes  -     Row Name 03/03/20 1755          Dynamic Sitting Balance    Level of [place], Reaches Outside Midline (Sitting, Dynamic Balance)  supervision  -     Sitting Position, Reaches Outside Midline (Sitting, Dynamic Balance)  sitting on edge of bed  -[facility] Name 03/03/20 1755          Static Standing Balance    Level of [place] (Supported Standing, Static Balance)  contact guard assist  -     Time Able to Maintain Position (Supported Standing, Static Balance)  1 to 2 minutes  -     Assistive Device  Utilized (Supported Standing, Static Balance)  walker, rolling  -JR     Row Name 03/03/20 1755          Dynamic Standing Balance    Level of [place], Reaches Outside Midline (Standing, Dynamic Balance)  minimal assist, 75% patient effort  -JR     Time Able to Maintain Position, Reaches Outside Midline (Standing, Dynamic Balance)  2 to 3 minutes  -JR     Assistive Device Utilized (Supported Standing, Dynamic Balance)  walker, rolling  -JR       User Key  (r) = Recorded By, (t) = Taken By, (c) = Cosigned By    Initials Name Provider Type    Junie Pathak, PT Physical Therapist        Goals/Plan     Row Name 03/03/20 1644          Bed Mobility Goal 1 (PT)    Activity/Assistive Device (Bed Mobility Goal 1, PT)  bed mobility activities, all  -JR     [place] Level/Cues Needed (Bed Mobility Goal 1, PT)  conditional independence  -JR     Time Frame (Bed Mobility Goal 1, PT)  1 week  -JR     Progress/Outcomes (Bed Mobility Goal 1, PT)  goal ongoing  -     Row Name 03/03/20 1644          Transfer Goal 1 (PT)    Activity/Assistive Device (Transfer Goal 1, PT)  sit-to-stand/stand-to-sit  -JR     [place] Level/Cues Needed (Transfer Goal 1, PT)  conditional independence  -JR     Time Frame (Transfer Goal 1, PT)  1 week  -JR     Progress/Outcome (Transfer Goal 1, PT)  goal ongoing  -     Row Name 03/03/20 1644          Gait Training Goal 1 (PT)    Activity/Assistive Device (Gait Training Goal 1, PT)  gait (walking locomotion);assistive device use;walker, rolling  -JR     [place] Level (Gait Training Goal 1, PT)  supervision required  -JR     Distance (Gait Goal 1, PT)  400  -JR     Time Frame (Gait Training Goal 1, PT)  2 weeks  -JR     Progress/Outcome (Gait Training Goal 1, PT)  goal ongoing  -     Row Name 03/03/20 1644          Patient Education Goal (PT)    Activity (Patient Education Goal, PT)  Perform LE exercises x 15 reps  -JR     [place]/Cues/Accuracy (Memory Goal 2, PT)  independent  -JR      Time Frame (Patient Education Goal, PT)  1 week  -JR     Progress/Outcome (Patient Education Goal, PT)  goal ongoing  -JR       User Key  (r) = Recorded By, (t) = Taken By, (c) = Cosigned By    Initials Name Provider Type    Junie Patahk, PT Physical Therapist        Clinical Impression     Row Name 03/03/20 6592          Pain Assessment    Additional Documentation  Pain Scale: Numbers Pre/Post-Treatment (Group)  -     Row Name 03/03/20 7696          Pain Scale: Numbers Pre/Post-Treatment    Pain Scale: Numbers, Pretreatment  0/10 - no pain  -JR     Pain Scale: Numbers, Post-Treatment  0/10 - no pain  -JR     Pain Location - Side  Right  -JR     Pain Location  knee  -JR     Pre/Post Treatment Pain Comment  Patient is numb  -JR     Pain Intervention(s)  Ambulation/increased activity;Repositioned;Medication (See MAR)  -     Row Name 03/03/20 3580          Plan of Care Review    Plan of Care Reviewed With  patient;family  -JR     Outcome Summary  Patient participates well in skilled PT evaluation  and demonstrates decreased left knee AROM and strength.  He requires minimal assistance for transfers and gait.  He is expected to benefit from additional PT servcies while hospitalized and upon discharge to home with home health care services.  PADD score is 9, which indicates he should be discharged directly home.   -     Row Name 03/03/20 6045          Physical Therapy Clinical Impression    Patient/Family Goals Statement (PT Clinical Impression)  Patient wants to go home  -JR     Criteria for Skilled Interventions Met (PT Clinical Impression)  yes;no problems identified which require skilled intervention  -JR     Rehab Potential (PT Clinical Summary)  good, to achieve stated therapy goals  -     Row Name 03/03/20 0110          Vital Signs    Pre Patient Position  Supine  -JR     Intra Patient Position  Standing  -JR     Post Patient Position  Supine  -JR     Row Name 03/03/20 7279          Positioning and  Restraints    Pre-Treatment Position  in bed  -JR     Post Treatment Position  bed  -JR     In Bed  fowlers;call light within reach;encouraged to call for assist  -JR       User Key  (r) = Recorded By, (t) = Taken By, (c) = Cosigned By    Initials Name Provider Type    Junie Pathak, BERNY Physical Therapist        Outcome Measures     Row Name 03/03/20 1644          How much help from another person do you currently need...    Turning from your back to your side while in flat bed without using bedrails?  3  -JR     Moving from lying on back to sitting on the side of a flat bed without bedrails?  3  -JR     Moving to and from a bed to a chair (including a wheelchair)?  3  -JR     Standing up from a chair using your arms (e.g., wheelchair, bedside chair)?  3  -JR     Climbing 3-5 steps with a railing?  2  -JR     To walk in hospital room?  3  -JR     AM-PAC 6 Clicks Score (PT)  17  -JR     Row Name 03/03/20 1649          Functional Assessment    Outcome Measure Options  AM-PAC 6 Clicks Basic Mobility (PT)  -JR       User Key  (r) = Recorded By, (t) = Taken By, (c) = Cosigned By    Initials Name Provider Type    Junie Pathak, BERNY Physical Therapist          PT Recommendation and Plan  Planned Therapy Interventions (PT Eval): strengthening, ROM (range of motion), balance training, transfer training, bed mobility training, gait training, home exercise program, patient/family education  Outcome Summary/Treatment Plan (PT)  Anticipated Discharge Disposition (PT): home with home health  Plan of Care Reviewed With: patient, family  Outcome Summary: Patient participates well in skilled PT evaluation  and demonstrates decreased left knee AROM and strength.  He requires minimal assistance for transfers and gait.  He is expected to benefit from additional PT servcies while hospitalized and upon discharge to home with home health care services.  PADD score is 9, which indicates he should be discharged directly home.      Time  Calculation:   PT Charges     Row Name 03/03/20 1818             Time Calculation    Start Time  1644  -JR      PT Received On  03/03/20  -      PT Goal Re-Cert Due Date  03/13/20  -        User Key  (r) = Recorded By, (t) = Taken By, (c) = Cosigned By    Initials Name Provider Type    Junie Pathak, PT Physical Therapist        Therapy Charges for Today     Code Description Service Date Service Provider Modifiers Qty    11022468023 HC PT EVAL LOW COMPLEXITY 3 3/3/2020 Junie Cantu, PT GP 1          PT G-Codes  Outcome Measure Options: AM-PAC 6 Clicks Basic Mobility (PT)  AM-PAC 6 Clicks Score (PT): 17    Junie Cantu, PT  3/3/2020

## 2020-03-03 NOTE — ANESTHESIA PROCEDURE NOTES
Peripheral Block      Start time: 3/3/2020 2:22 PM  Stop time: 3/3/2020 2:27 PM  Performed by  CRNA: Noe Tubbs CRNA  Preanesthetic Checklist  Completed: patient identified, site marked, surgical consent, pre-op evaluation, timeout performed, IV checked, risks and benefits discussed and monitors and equipment checked  Prep:  Pt Position: supine  Sterile barriers:cap, gloves, gown, mask and sterile barriers  Prep: ChloraPrep  Patient monitoring: blood pressure monitoring, continuous pulse oximetry and EKG  Procedure  Sedation:yes  Performed under: spinal  Guidance:ultrasound guided    Laterality:leftInjection Technique:single-shot  Needle Type:echogenic  Needle Gauge:21 G      Medications Used: bupivacaine PF (MARCAINE) injection 0.5%, 5 mL      Post Assessment  Injection Assessment: negative aspiration for heme, no paresthesia on injection and incremental injection  Patient Tolerance:comfortable throughout block  Complications:no  Additional Notes  Risk, benefits alternatives described to the pt.  Risk include but not limited to infection at the site, temporary or permeant nerve damage, and cardiac arrest resulting in possible death. All questions Answered.  Ultrasound guidance, nerve stimulation as indicated, incremental injection, negative aspirate, no pain on injection, normal injection resistance , vascular puncture avoided, nerves and surrounding tissue identified, LA spread around nerves visualized.  Selective tibial using 5 mls of 0.5% marcaine

## 2020-03-03 NOTE — ANESTHESIA PROCEDURE NOTES
Spinal Block      Patient location during procedure: OR  Indication:procedure for pain  Performed By  CRNA: Noe Tubbs CRNA  Preanesthetic Checklist  Completed: patient identified, site marked, surgical consent, pre-op evaluation, timeout performed, IV checked, risks and benefits discussed and monitors and equipment checked  Spinal Block Prep:  Patient Position:sitting  Sterile Tech:cap, gloves, gown, mask and sterile barriers  Prep:Chloraprep  Patient Monitoring:blood pressure monitoring, continuous pulse oximetry and EKG  Spinal Block Procedure  Approach:midline  Guidance:landmark technique and palpation technique  Location:L3-L4  Needle Type:Quincke  Needle Gauge:25 G  Placement of Spinal needle event:cerebrospinal fluid aspirated    Fluid Appearance:clear  Medications: bupivacaine PF (MARCAINE) injection 0.75%, 1.8 mL   Post Assessment  Patient Tolerance:patient tolerated the procedure well with no apparent complications  Complications no  Additional Notes  Risks discussed , including but not limited to:  Headache, itching, n&v, infection, failure, decreased bp. Permanent chronic back pain, nerve damage, paralysis, etc.

## 2020-03-03 NOTE — PLAN OF CARE
Problem: Patient Care Overview  Goal: Plan of Care Review  Outcome: Ongoing (interventions implemented as appropriate)  Flowsheets (Taken 3/3/2020 2735)  Outcome Summary: Patient participates well in skilled PT evaluation  and demonstrates decreased left knee AROM and strength.  He requires minimal assistance for transfers and gait.  He is expected to benefit from additional PT servcies while hospitalized and upon discharge to home with home health care services.  PADD score is 9 which indicates he should be discharged directly home.

## 2020-03-03 NOTE — OP NOTE
[facility] [address], [address]  [place]  [address]   [phone]      OPERATIVE REPORT      PATIENT NAME:  Benjie Mcgovern                            YOB: 1965                     PREOP DIAGNOSIS: Left knee advanced degenerative joint disease    POSTOP DIAGNOSIS: Same.    PROCEDURE:  Left total knee replacement    SURGEON:   Dean Garcia M.D.    FIRST ASSIST:  Circulator: Lindsey Pham RN; Alecia Dugan RN  Scrub Person: Brian Palencia; Reshma Parks; Ramu Villa    ANESTHETIST:  CRNA: Noe Tubbs CRNA    ANESTHESIA:   Spinal    FLUIDS:   2000 ml crystalloid    ESTIMATED BLOOD LOSS: 150 ml    URINE OUTPUT:  200 ml    SPECIMENS:   Knee bone cuts sent to Pathology.    DRAINS:   Cr to gravity.    TOURNIQUET TIME:  72 min @ 275mm Hg    HARDWARE: A [employer] cemented cruciate retaining total knee replacement with a size # 6 oxinium femur component, a size # 6 tibia component, 11 mm CR standard polyethylene liner, and a 32 x 7.5 mm polyethylene patella    FINDINGS:                              End-stage DJD, osteophytes, erosions, cysts, bone-on-bone wear, three compartment involvement, significant varus deformity, extensor lag and ligament imbalance.    COMPLICATIONS:  None.    DISPOSITION:  Stable to recovery.     INDICATIONS/NARRATIVE:   Patient presents for elective knee arthroplasty to address painful intractable advanced degenerative joint disease with knee swelling, stiffness, deformity, instability and dysfunction.  The patient presents for planned elective total knee replacement.  Risks and benefits of the surgery were discussed and informed consent was obtained with the patient.  Risks discussed including but not limited to anesthesia, infection, nerve/vessel/tendon injury, fracture, prosthetic loosening or wear, blood loss and possible need for transfusion, DVT and pulmonary embolus.  Goals include pain relief, improved knee  alignment, improved knee range of motion and better function for ambulation and activities.      Antibiotic prophylaxis was given.  Tranexamic acid protocol followed.  Surgeon site marking and a time out were performed prior to the procedure.  Anesthesia was effective and well tolerated.  The knee was prepped and draped in the usual sterile fashion.  A padded thigh tourniquet was placed for the procedure.    After sterile prep and drape, an anterior incision was made at the knee.  A medial para-patella arthrotomy was made and dissection continued proximally along the medial one-third of the quadriceps tendon and distally along the medial border of the patella tendon.  A partial inferior patella fat pad release was performed as well as a superior synovectomy.   Next, steps were taken to prepare the femoral, tibial and patella surfaces for the knee replacement implant.  Care was taken to incorporate 4 degrees of valgus and 3 degrees of external rotation in the femoral cut, neutral varus/valgus, neutral to slight external rotation and 3-5 degrees of posterior slope of the tibial cut, and the patella cut prepared to restore the original thickness with implants.  Equal flexion and extension gaps were achieved.  The minimally invasive instruments, sizing templates, cutting blocks, and guides were used together with radiographic x-ray templates.  The knee trial components achieved good alignment, fit, motion and stability. Soft tissue balancing was achieved in flexion and extension. The bone surfaces were thoroughly irrigated with antibiotic pulsed irrigation.  The entry to the femoral canal was closed with a bone plug.      Next, using standard cement technique including vacuum mixing, centrifugation and pressurization, the actual prosthetic components as described above were cemented in place.  Again, a trial liner was placed to check for the best range of motion and stability of the knee.  The trial was removed and the  actual polyethylene liner was placed onto the tibial tray and secured with the locking mechanism.  The knee was again taken through a final range of motion, alignment and stability check which was excellent.  There was also excellent patella tracking through the entire range of motion. The knee and leg showed good alignment.  The tourniquet was taken down and there was good hemostasis.  Minor venous bleeding was controlled with electrocautery.  The wound was irrigated copiously again as well as throughout the procedure and during closure with pulse lavage antibiotic irrigation.  Routine closure was performed consisting of interrupted figure-of-eight #1 Vicryl for the extensor mechanism, 2-0 Vicryl for deep and superficial subcutaneous layers and staples for the skin.  A sterile padded Aquacel-Ag dressing was applied.  Anesthesia was effective and well tolerated.  There were no complications with the procedure.  The patient was transferred stable to recovery.      Dean Garcia MD  3/3/2020  2:00 PM

## 2020-03-03 NOTE — ANESTHESIA POSTPROCEDURE EVALUATION
Patient: Benjie Mcgovern    Procedure Summary     Date:  03/03/20 Room / Location:  [facility] OR  /  [facility]    Anesthesia Start:  1108 Anesthesia Stop:  1346    Procedure:  total knee arthroplasty, Left (Left Knee) Diagnosis:       Primary osteoarthritis of left knee      Chronic pain of left knee      (Primary osteoarthritis of right knee [M17.11])      (Chronic pain of right knee [M25.561, G89.29])    Surgeon:  Dean Garcia MD Provider:  Noe Tubbs CRNA    Anesthesia Type:  spinal, regional ASA Status:  3          Anesthesia Type: spinal, regional    Vitals  Vitals Value Taken Time   /72 3/3/2020  1:44 PM   Temp     Pulse 75 3/3/2020  1:46 PM   Resp     SpO2 97 % 3/3/2020  1:46 PM   Vitals shown include unvalidated device data.        Post Anesthesia Care and Evaluation    Patient location during evaluation: bedside  Patient participation: complete - patient participated  Level of consciousness: awake and alert  Pain score: 0  Pain management: adequate  Airway patency: patent  Anesthetic complications: No anesthetic complications  PONV Status: none  Cardiovascular status: acceptable  Respiratory status: acceptable  Hydration status: acceptable

## 2020-03-03 NOTE — ANESTHESIA PROCEDURE NOTES
Peripheral Block      Patient reassessed immediately prior to procedure    Start time: 3/3/2020 2:11 PM  Stop time: 3/3/2020 2:27 PM  Reason for block: at surgeon's request and post-op pain management  Preanesthetic Checklist  Completed: patient identified, site marked, surgical consent, pre-op evaluation, timeout performed, IV checked, risks and benefits discussed and monitors and equipment checked  Prep:  Pt Position: supine  Sterile barriers:cap, gloves, mask and sterile barriers  Prep: ChloraPrep  Patient monitoring: blood pressure monitoring, continuous pulse oximetry and EKG  Procedure    Guidance:ultrasound guided  ULTRASOUND INTERPRETATION. Using ultrasound guidance a gauge needle was placed in close proximity to the femoral nerve, at which point, under ultrasound guidance anesthetic was injected in the area of the nerve and spread of the anesthesia was seen on ultrasound in close proximity thereto.  There were no abnormalities seen on ultrasound; a digital image was taken; and the patient tolerated the procedure with no complications. Images:still images obtained    Laterality:left  Block Type:adductor canal block  Injection Technique:catheter  Needle Type:echogenic  Needle Gauge:19 G  Resistance on Injection: none  Cath Depth at skin: 8 cm    Medications Used: bupivacaine PF (MARCAINE) injection 0.5%, 12 mL      Medications  Comment:Adjuncts per total volume of LA:    Decadron 0 mg PSF    If required, intravenous sedation was given -- see meds on anesthesia record.    Post Assessment  Injection Assessment: negative aspiration for heme, no paresthesia on injection and incremental injection  Patient Tolerance:comfortable throughout block  Complications:no  Additional Notes  Procedure:          CATHETER ADDUCTOR CANAL BLOCK                                                             CATHETER at skin: 8                                Patient analgesia was achieved with SAB       The pt was placed in the Supine  position.  The Insertion site was  prepped and Draped in sterile fashion.  A  I-Flow 18g echogenic needle was then  inserted approximately midline, mid-thigh and advanced In-plane with Ultrasound guidance.  Normal Saline PSF was utilized for hydrodissection of tissue.  The Vastus medialis and Sartorius muscle where visualized and the needle tip was placed in the adductor canal,  lateral to the femoral artery.  LA injection spread was visualized, injection was incremental 1-5 ml, injection pressure was normal or little; no intraneural injection; no vascular injection.  LA dose was injected thru the needle (see dose above).  I-flow 20g catheter was placed via the needle with ultrasound visualization and confirmation with NS fluid bolus or air injection. The needle was then removed and the skin was sealed with Skin Affiix at catheter insertion site.  Skin was prepped with benzoin or mastisol and the labeled curled catheter was secured with steristrips and a transparent dressing.    +++++++++++++++++++++++++++++++++++++++++

## 2020-03-04 ENCOUNTER — APPOINTMENT (OUTPATIENT)
Dept: ULTRASOUND IMAGING | Facility: HOSPITAL | Age: 55
End: 2020-03-04

## 2020-03-04 ENCOUNTER — APPOINTMENT (OUTPATIENT)
Dept: CT IMAGING | Facility: HOSPITAL | Age: 55
End: 2020-03-04

## 2020-03-04 DIAGNOSIS — Z96.652 STATUS POST TOTAL KNEE REPLACEMENT USING CEMENT, LEFT: Primary | ICD-10-CM

## 2020-03-04 LAB
ANION GAP SERPL CALCULATED.3IONS-SCNC: 12 MMOL/L (ref 5–15)
BASOPHILS # BLD AUTO: 0.02 10*3/MM3 (ref 0–0.2)
BASOPHILS NFR BLD AUTO: 0.1 % (ref 0–1.5)
BUN BLD-MCNC: 16 MG/DL (ref 6–20)
BUN/CREAT SERPL: 22.9 (ref 7–25)
CALCIUM SPEC-SCNC: 8.7 MG/DL (ref 8.6–10.5)
CHLORIDE SERPL-SCNC: 103 MMOL/L (ref 98–107)
CO2 SERPL-SCNC: 21 MMOL/L (ref 22–29)
CREAT BLD-MCNC: 0.7 MG/DL (ref 0.76–1.27)
DEPRECATED RDW RBC AUTO: 40.2 FL (ref 37–54)
EOSINOPHIL # BLD AUTO: 0 10*3/MM3 (ref 0–0.4)
EOSINOPHIL NFR BLD AUTO: 0 % (ref 0.3–6.2)
ERYTHROCYTE [DISTWIDTH] IN BLOOD BY AUTOMATED COUNT: 12.2 % (ref 12.3–15.4)
GFR SERPL CREATININE-BSD FRML MDRD: 118 ML/MIN/1.73
GLUCOSE BLD-MCNC: 136 MG/DL (ref 65–99)
HCT VFR BLD AUTO: 43.5 % (ref 37.5–51)
HGB BLD-MCNC: 14.7 G/DL (ref 13–17.7)
IMM GRANULOCYTES # BLD AUTO: 0.07 10*3/MM3 (ref 0–0.05)
IMM GRANULOCYTES NFR BLD AUTO: 0.4 % (ref 0–0.5)
LYMPHOCYTES # BLD AUTO: 1.59 10*3/MM3 (ref 0.7–3.1)
LYMPHOCYTES NFR BLD AUTO: 8.5 % (ref 19.6–45.3)
MCH RBC QN AUTO: 30.5 PG (ref 26.6–33)
MCHC RBC AUTO-ENTMCNC: 33.8 G/DL (ref 31.5–35.7)
MCV RBC AUTO: 90.2 FL (ref 79–97)
MONOCYTES # BLD AUTO: 1.49 10*3/MM3 (ref 0.1–0.9)
MONOCYTES NFR BLD AUTO: 8 % (ref 5–12)
NEUTROPHILS # BLD AUTO: 15.44 10*3/MM3 (ref 1.7–7)
NEUTROPHILS NFR BLD AUTO: 83 % (ref 42.7–76)
NRBC BLD AUTO-RTO: 0 /100 WBC (ref 0–0.2)
PLATELET # BLD AUTO: 261 10*3/MM3 (ref 140–450)
PMV BLD AUTO: 11 FL (ref 6–12)
POTASSIUM BLD-SCNC: 4.2 MMOL/L (ref 3.5–5.2)
RBC # BLD AUTO: 4.82 10*6/MM3 (ref 4.14–5.8)
SODIUM BLD-SCNC: 136 MMOL/L (ref 136–145)
TROPONIN T SERPL-MCNC: <0.01 NG/ML (ref 0–0.03)
WBC NRBC COR # BLD: 18.61 10*3/MM3 (ref 3.4–10.8)

## 2020-03-04 PROCEDURE — 84484 ASSAY OF TROPONIN QUANT: CPT | Performed by: NURSE PRACTITIONER

## 2020-03-04 PROCEDURE — 80048 BASIC METABOLIC PNL TOTAL CA: CPT | Performed by: ORTHOPAEDIC SURGERY

## 2020-03-04 PROCEDURE — 97110 THERAPEUTIC EXERCISES: CPT

## 2020-03-04 PROCEDURE — 97116 GAIT TRAINING THERAPY: CPT

## 2020-03-04 PROCEDURE — 85025 COMPLETE CBC W/AUTO DIFF WBC: CPT | Performed by: ORTHOPAEDIC SURGERY

## 2020-03-04 PROCEDURE — G0378 HOSPITAL OBSERVATION PER HR: HCPCS

## 2020-03-04 PROCEDURE — 99024 POSTOP FOLLOW-UP VISIT: CPT | Performed by: PHYSICIAN ASSISTANT

## 2020-03-04 PROCEDURE — 93005 ELECTROCARDIOGRAM TRACING: CPT | Performed by: NURSE PRACTITIONER

## 2020-03-04 PROCEDURE — 99213 OFFICE O/P EST LOW 20 MIN: CPT | Performed by: NURSE PRACTITIONER

## 2020-03-04 PROCEDURE — 25010000002 IOPAMIDOL 61 % SOLUTION: Performed by: ORTHOPAEDIC SURGERY

## 2020-03-04 PROCEDURE — 93971 EXTREMITY STUDY: CPT

## 2020-03-04 PROCEDURE — 97165 OT EVAL LOW COMPLEX 30 MIN: CPT

## 2020-03-04 PROCEDURE — 97530 THERAPEUTIC ACTIVITIES: CPT

## 2020-03-04 PROCEDURE — 25010000003 CEFAZOLIN SODIUM-DEXTROSE 1-4 GM-%(50ML) RECONSTITUTED SOLUTION: Performed by: ORTHOPAEDIC SURGERY

## 2020-03-04 PROCEDURE — 71275 CT ANGIOGRAPHY CHEST: CPT

## 2020-03-04 PROCEDURE — 25010000002 FONDAPARINUX PER 0.5 MG: Performed by: ORTHOPAEDIC SURGERY

## 2020-03-04 RX ADMIN — SODIUM CHLORIDE, PRESERVATIVE FREE 3 ML: 5 INJECTION INTRAVENOUS at 20:49

## 2020-03-04 RX ADMIN — IOPAMIDOL 100 ML: 612 INJECTION, SOLUTION INTRAVENOUS at 16:45

## 2020-03-04 RX ADMIN — LOSARTAN POTASSIUM 50 MG: 50 TABLET, FILM COATED ORAL at 09:17

## 2020-03-04 RX ADMIN — SODIUM CHLORIDE, POTASSIUM CHLORIDE, SODIUM LACTATE AND CALCIUM CHLORIDE 100 ML/HR: 600; 310; 30; 20 INJECTION, SOLUTION INTRAVENOUS at 01:37

## 2020-03-04 RX ADMIN — DILTIAZEM HYDROCHLORIDE 120 MG: 120 CAPSULE, COATED, EXTENDED RELEASE ORAL at 20:49

## 2020-03-04 RX ADMIN — ASPIRIN 81 MG: 81 TABLET, COATED ORAL at 09:17

## 2020-03-04 RX ADMIN — SODIUM CHLORIDE, PRESERVATIVE FREE 3 ML: 5 INJECTION INTRAVENOUS at 09:18

## 2020-03-04 RX ADMIN — FONDAPARINUX SODIUM 2.5 MG: 2.5 INJECTION, SOLUTION SUBCUTANEOUS at 09:17

## 2020-03-04 RX ADMIN — HYDROCODONE BITARTRATE AND ACETAMINOPHEN 1 TABLET: 7.5; 325 TABLET ORAL at 01:37

## 2020-03-04 RX ADMIN — APIXABAN 5 MG: 5 TABLET, FILM COATED ORAL at 20:49

## 2020-03-04 RX ADMIN — HYDROCODONE BITARTRATE AND ACETAMINOPHEN 1 TABLET: 7.5; 325 TABLET ORAL at 18:17

## 2020-03-04 RX ADMIN — CEFAZOLIN SODIUM 1 G: 1 SOLUTION INTRAVENOUS at 02:52

## 2020-03-04 NOTE — PROGRESS NOTES
PROGRESS NOTE        Date of Admission: 3/3/2020  Length of Stay: 0  Primary Care Physician: Evie Martin MD    Subjective   Chief Complaint:  Follow up left total knee replacement  HPI: This is a 54-year-old male with past medical history significant for bilateral knee joint osteoarthritis, hypertension, and atrial fibrillation who was admitted 3/3/20 after undergoing a left total knee replacement performed by Dr. Garcia.  Patient had no events overnight.  He denies chest pain, SOA, abdominal pain, nausea or vomiting.  PT/OT consulted for strength and mobility.  He was diagnosed with 1 episode of atrial fibrillation several years ago and was placed on Cardizem at that time.  He has had no further episodes to his knowledge since that time however today while up with therapy patients heart rate did reach 180s per PT.  It has since returned to low 100s.  I have ordered EKG and cardiac monitoring.   EKG showed sinus rhythm with nonspecific T wave changes which were unchanged when compared to the EKG done on 2/19/2020.  Patient is asymptomatic.  He denies any chest pain, shortness of breath, abdominal pain, nausea or vomiting.           Review Of Systems:   Review of Systems  General ROS: Patient denies any fevers, chills or loss of consciousness.    ENT ROS: Denies sore throat, nasal congestion or ear pain.   Respiratory ROS: Denies cough or shortness of breath.   Cardiovascular ROS: Denies chest pain or palpitations. No history of exertional chest pain.   Gastrointestinal ROS: Denies nausea and vomiting. Denies any abdominal pain. No diarrhea.  Genito-Urinary ROS: Denies dysuria or hematuria.  Musculoskeletal ROS: Denies back pain. No muscle pain. No calf pain.   Neurological ROS: Denies any focal weakness. No loss of consciousness. Denies any numbness. Denies neck pain.   Dermatological ROS: Denies any redness or pruritis.    Objective      Temp:  [97.1 °F (36.2 °C)-98.3 °F (36.8 °C)] 97.7 °F (36.5 °C)  Heart  Rate:  [57-88] 73  Resp:  [12-18] 16  BP: (106-157)/(60-91) 126/80  Physical Exam    General Appearance:  Alert and cooperative, not in any acute distress.   Head:  Atraumatic and normocephalic, without obvious abnormality.   Eyes:          PERRLA, conjunctivae and sclerae normal, no Icterus. No pallor. Extraocular movements are within normal limits.   Ears:  Ears appear intact with no abnormalities noted.   Throat: No oral lesions, no thrush, oral mucosa moist.   Neck: Supple, trachea midline, no thyromegaly, no carotid bruit.       Lungs:   Chest shape is normal. Breath sounds heard bilaterally equally.  No crackles or wheezing. No Pleural rub or bronchial breathing.   Heart:  Normal S1 and S2, no murmur, no gallop, no rub. No JVD   Abdomen:   Normal bowel sounds, no masses, no organomegaly. Soft    non-tender, non-distended, no guarding, no rebound             tenderness   Extremities: Moves all extremities well, no edema, no cyanosis, no clubbing.  Dressing left knee intact.   Pulses: Pulses palpable and equal bilaterally   Skin: No bleeding, bruising or rash       Neurologic:    Psychiatric/Behavior:     Cranial nerves 2 - 12 grossly intact, sensation intact, Motor power is normal and equal bilaterally.  Mood normal, behavior normal       Results Review:    I have reviewed the labs, radiology results and diagnostic studies.    Results from last 7 days   Lab Units 03/04/20  0546   WBC 10*3/mm3 18.61*   HEMOGLOBIN g/dL 14.7   PLATELETS 10*3/mm3 261     Results from last 7 days   Lab Units 03/04/20  0546   SODIUM mmol/L 136   POTASSIUM mmol/L 4.2   CO2 mmol/L 21.0*   CREATININE mg/dL 0.70*   GLUCOSE mg/dL 136*       Culture Data: No results found for: BLOODCX, URINECX, WOUNDCX, MRSACX, RESPCX, STOOLCX  Radiology Data:   Cardiology Data:    I have reviewed the medications.    Assessment/Plan     Assessment/Plan:    1.  Left calf partial thrombosis of the posterior tibial vein-I will go ahead and start patient on  Eliquis 5 mg twice daily since he has had a left total knee replacement and is high risk of getting a DVT.  However given that it is below the knee he will only require treatment for 7 to 10 days and have Dr. Garcia follow-up with a duplex in 7 to 10 days to ensure no elongation of the thrombus.  If the thrombus is stable with no propagation then he can discontinue the anticoagulation.  Given that he did have some tachycardia this morning however I will go ahead and get a CT PE protocol just to ensure there is no evidence of pulmonary embolism.  He has no evidence of hypoxia he is sinus rhythm at this time with no tachycardia.  I will follow-up on the CT scan however.    2.  Bilateral knee joint osteoarthritis status post left total knee replacement performed on 3/320 by Dr. Garcia with orthopedics.  Patient is on analgesics, DVT prophylaxis.  PT OT has been consulted and case management for discharge planning    3.  Chronic essential hypertension-blood pressure stable continue home antihypertensive medications    4.  Paroxysmal atrial fibrillation x1 episode several years ago-currently sinus rhythm.  Patient is not on anticoagulation.  He has a chads 2 score of 1.  He is on Arixtra for DVT prophylaxis.    5.  Leukocytosis-  Likely secondary to steroids.      6.  Tachycardia with exertion-  EKG showed no change from prior and sinus rhythm.  Place on telemetry.      DVT prophylaxis:  Arixtra  Discharge Planning:   JOEL Escalante 03/04/20 8:30 AM

## 2020-03-04 NOTE — PROGRESS NOTES
Discharge Planning Assessment  [facility]     Patient Name: Benjie Mcgovern  MRN: [id number]  Today's Date: 3/4/2020    Admit Date: 3/3/2020    Discharge Needs Assessment     Row Name 03/04/20 1238       Living Environment    Name(s) of Who Lives With Patient  Simona    Primary Care Provided by  self    Provides Primary Care For  no one    Family Caregiver if Needed  none    Quality of Family Relationships  unable to assess    Able to Return to Prior Arrangements  yes    Living Arrangement Comments  Lives with wife and daughter in 2 story house.  Has Steps        Resource/Environmental Concerns    Transportation Concerns  car, none       Discharge Needs Assessment    Readmission Within the Last 30 Days  no previous admission in last 30 days    Concerns to be Addressed  discharge planning    Equipment Currently Used at Home  none    Anticipated Changes Related to Illness  none    Equipment Needed After Discharge  commode;walker, rolling    Outpatient/Agency/Support Group Needs  [facility] and [facility]    Discharge Facility/Level of Care Needs  home with home health    Provided Post Acute Provider List?  Yes    Post Acute Provider List  DME Supplier;Home Health    Delivered To  Patient    Method of Delivery  In person    Patient's Choice of Community Agency(s)  [facility] in [facility].        Discharge Plan     Row Name 03/04/20 6323       Plan    Plan  Spoke to pt in room.  Has no POA or Living Will.  Lives with wife and daughter in 2 story [place].  Address and phone no correct.  Independent with ADLS.  Has no medical equipment.  Plans to go home at discharge.  Has transportation.  [facility] and [facility] have [facility]. Live Close to the PT that works there [employer] and request that he visit.  Called to Emmanuelle at [facility] and Referral faxed.  Pt plans discharge tomorrow and will need a rolling walker and a bedside  commode.  Chose from list [facility] for DME.         Destination      Coordination has not been started for this encounter.             Demographic Summary     Row Name 03/04/20 1235       General Information    Admission Type  observation    Arrived From  PACU/recovery room    Expected Length of Stay (LOS)  2 nights    Referral Source  admission list    Reason for Consult  discharge planning     Used During This Interaction  no        Functional Status     Row Name 03/04/20 1237       Functional Status    Usual Activity Tolerance  good    Functional Status Comments  Independent       Functional Status, IADL    Medications  independent    Meal Preparation  independent    Housekeeping  independent    Laundry  independent    Shopping  independent    IADL Comments  Self        Mental Status Summary    Recent Changes in Mental Status/Cognitive Functioning  no changes    Mental Status Comments  Alert and oriented            Inna Trujillo RN Discharge Planning Note     Patient Name: Benjie Mcgovern  MRN: [id number]  Today's Date: 3/4/2020    Admit Date: 3/3/2020    Discharge Plan     Row Name 03/04/20 1240       Plan    Plan Spoke to pt in room.  Has no POA or Living Will.  Lives with wife and daughter in 2 story house.  Address and phone no correct.  Independent with ADLS.  Has no medical equipment.  Plans to go home at discharge.  Has transportation.  Pt lives in [facility] and request to have [facility] for Home Health. Livea close to the Physical THerapist that works there [employer] and request that he visit.  Called to Emmanuelle at [facility] and Referral faxed.  Pt thinks will be discharged tomorrow and will need a rolling walker and a bedside commode.  Chose from list [facility] for DME. Called to Emmanuelle at [facility] at  and referral Faxed to  [phone].          Discharge Codes    No documentation.             Inna Trujillo RN

## 2020-03-04 NOTE — PLAN OF CARE
Problem: Patient Care Overview  Goal: Plan of Care Review  Outcome: Ongoing (interventions implemented as appropriate)  Flowsheets (Taken 3/4/2020 2382)  Progress: improving  Plan of Care Reviewed With: patient  Outcome Summary: VSS, pain controlled, voiding well after f/c removal, tolerating diet, has not slept well d/t positioning

## 2020-03-04 NOTE — CONSULTS
[facility]      Name:  Benjie Mcgovern   Age:  54 y.o.  Sex:  male  :  1965  MRN:  [id number]   Visit Number:  [id number]  Admission Date:  3/3/2020  Date Of Service:  20  Primary Care Physician:  Evie Martin MD    Consulting Physician:    Raymon Cabrera MD    Referring Physician:    Dean Garcia MD    Reason For Consult:    Medical management.    Chief Complaint:     Left knee joint pain.    History Of Presenting Illness:      This is a 54-year-old pleasant male with bilateral knee joint osteoarthritis, hypertension, lone atrial fibrillation was admitted by orthopedic services for elective left total knee arthroplasty which was done earlier today.  I am seeing the patient postoperatively.  Patient is currently sitting up on the bed and is comfortable at rest.  He states that he did walk in the hallway with the help of physical therapy earlier today.  He denies any chest pain or shortness of breath.  He does have history of hypertension and takes losartan.  He also has history of lone atrial fibrillation that was diagnosed several years ago and he was placed on Cardizem at that time.  He states that he has not had any further episodes of recurrence.  He does not take any anticoagulation other than low-dose aspirin therapy.  He works as a  for [employer].  He denies smoking.    Review Of Systems:     General ROS: Patient denies any fevers, chills or loss of consciousness.  Psychological ROS: Denies any hallucinations and delusions.  Ophthalmic ROS: Denies any diplopia or transient loss of vision.  ENT ROS: Denies sore throat, nasal congestion or ear pain.   Allergy and Immunology ROS: Denies rash or itching.  Hematological and Lymphatic ROS: Denies neck swelling or easy bleeding.  Endocrine ROS: Denies any recent unintentional weight gain or loss.  Respiratory ROS: Denies cough or shortness of breath.  Cardiovascular ROS: Denies chest pain or  "palpitations. No history of exertional chest pain.  Gastrointestinal ROS: Denies nausea and vomiting. Denies any abdominal pain. No diarrhea.  Genito-Urinary ROS: Denies dysuria or hematuria.  Musculoskeletal ROS: As per history of presenting illness.  Neurological ROS: Denies any focal weakness. No loss of consciousness. Denies any numbness.  Dermatological ROS: Denies any redness or pruritis.     Past Medical History:    Past Medical History:   Diagnosis Date   • Arthritis     Left knee   • Atrial fibrillation (CMS/HCC) 2003    REPORTS WAS DIAGNOSED IN 2003 AND THAT HE HAS HAD NO EPISODES SINCE THAT TIME   • ED (erectile dysfunction) of non-organic origin    • Elevated cholesterol     REPORTS WAS TOLD \"BORDERLINE\" BUT THAT HE HAS NEVER TAKEN MEDICATION   • Foot pain    • History of exercise stress test 2003    REPORTS WAS TOLD THAT ALL WAS WNL'S   • History of fracture     HISTORY OF LEFT WRIST AS A CHILD - HAD SURGICAL INTERVENTION   • History of kidney stones     REPORTS MULTIPLE EPISODES AND THAT HE HAS REQUIRED SURGICAL INTERVENTION IN THE PAST   • Hyperlipidemia    • Hypertension    • Impaired functional mobility, balance, gait, and endurance    • Wears contact lenses     INSTRUCTED TO LEAVE CONTACTS OUT THE DOS     Past Surgical history:    Past Surgical History:   Procedure Laterality Date   • COLONOSCOPY N/A 8/7/2017    Procedure: COLONOSCOPY WITH BXS, POLYPECTOMY;  Surgeon: Fermín Hamilton MD;  Location: [facility] ENDOSCOPY;  Service:    • FACIAL RECONSTRUCTION SURGERY      FROM DOG BITE AS A CHILD   • FRACTURE SURGERY Left     WRIST AS  A CHILD   • KIDNEY STONE SURGERY     • KNEE ARTHROSCOPY Left 9/27/2018    Procedure: Knee diagnostic arthroscopy, left with partial medial menisectomy, removal of loose bodies and two compartment chondroplasty;  Surgeon: Dean Garcia MD;  Location: [facility] OR;  Service: Orthopedics   • KNEE SURGERY Right 2015     Social History:    Social History     Socioeconomic " History   • Marital status:      Spouse name: Not on file   • Number of children: Not on file   • Years of education: Not on file   • Highest education level: Not on file   Occupational History   • Occupation:      Employer: [employer] [place]     Comment: since 1996   Tobacco Use   • Smoking status: Never Smoker   • Smokeless tobacco: Never Used   Substance and Sexual Activity   • Alcohol use: No   • Drug use: No   • Sexual activity: Defer     Family History:    Family History   Problem Relation Age of Onset   • Other Other         brain tumor, renal disease   • Cancer Other    • Stroke Other    • Colon cancer Neg Hx      Allergies:      Patient has no known allergies.    Home Medications:    Prior to Admission Medications     Prescriptions Last Dose Informant Patient Reported? Taking?    acetaminophen (TYLENOL) 500 MG tablet 3/2/2020 Self Yes Yes    Take 500 mg by mouth Every 6 (Six) Hours As Needed for Mild Pain .    aspirin 81 MG tablet Past Week Self Yes Yes    Take 81 mg by mouth Daily.    dilTIAZem CD (CARDIZEM CD) 120 MG 24 hr capsule 3/2/2020  No Yes    Take 1 capsule by mouth Every Night.    losartan (COZAAR) 50 MG tablet 3/3/2020 Self No Yes    Take 1 tablet by mouth Daily.          Hospital Scheduled Meds:      aspirin 81 mg Oral Daily   bupivacaine (PF)      ceFAZolin 1 g Intravenous Q8H   dilTIAZem  mg Oral Nightly   [START ON 3/4/2020] fondaparinux 2.5 mg Subcutaneous Q24H   [START ON 3/4/2020] losartan 50 mg Oral Daily   sodium chloride 3 mL Intravenous Q12H     Bupivacaine HCl-NaCl 4-14 mL/hr Last Rate: 6 mL/hr (03/03/20 1442)   lactated ringers 100 mL/hr Last Rate: 100 mL/hr (03/03/20 1728)      Vital Signs:    Temp:  [97.1 °F (36.2 °C)-98.3 °F (36.8 °C)] 97.7 °F (36.5 °C)  Heart Rate:  [57-82] 75  Resp:  [12-18] 14  BP: (106-157)/(60-91) 132/73        03/03/20  1530   Weight: 109 kg (240 lb 14.4 oz)     Body mass index is 32.67 kg/m².    Physical  Exam:    General Appearance:  Alert and cooperative, not in any acute distress.   Head:  Atraumatic and normocephalic, without obvious abnormality.   Eyes:          PERRLA, conjunctivae and sclerae normal, no Icterus. No pallor. Extraocular movements are within normal limits.   Ears:  Ears appear intact with no abnormalities noted.   Throat: No oral lesions, no thrush, oral mucosa moist.   Neck: Supple, trachea midline, no thyromegaly, no carotid bruit.   Back:   No kyphoscoliosis present. No tenderness to palpation,   range of motion normal.   Lungs:   Chest shape is normal. Breath sounds heard bilaterally equally.  No crackles or wheezing. No Pleural rub or bronchial breathing.   Heart:  Normal S1 and S2, no murmur, no gallop, no rub. No JVD.   Abdomen:   Normal bowel sounds, no masses, no organomegaly. Soft, non-tender, non-distended, no guarding, no rebound tenderness.  Cr catheter is in place.   Extremities: Moves all extremities well, no edema, no cyanosis, no clubbing.  Surgical bandage noted on the anterior part of the left knee.  Subcutaneous local anesthesia pump noted on the left thigh.   Pulses: Pulses palpable and equal bilaterally.   Skin: No bleeding, bruising or rash.   Neurologic: Alert and oriented x 3. Moves all four limbs equally. No tremors. No facial asymmetry.     Laboratory data:    No laboratory data from today.  Last preoperative laboratory done on 2/19/2020 was reviewed by me.  All laboratory data including CMP, CBC, PT/INR, hemoglobin A1c and urine analysis are fairly within normal range.    EKG:      EKG done on 2/19/2020 was reviewed by me.  It shows sinus rhythm at 75 bpm.  Left axis deviation noted.  Inverted T waves noted only in lead III.  No other significant ST-T changes were noted.    Radiology:    Imaging Results (Last 72 Hours)     Procedure Component Value Units Date/Time    XR Knee 1 or 2 View Left [[id number]] Collected:  03/03/20 1447     Updated:  03/03/20 1457     Narrative:       PROCEDURE: XR KNEE 1 OR 2 VW LEFT-     HISTORY: Post-op L TKR; M17.12-Unilateral primary osteoarthritis, left  knee; M25.562-Pain in left knee; G89.29-Other chronic pain     COMPARISON: 01/03/2020     FINDINGS:     Two views left knee show surgical changes of arthroplasty. Hardware has  an unremarkable appearance. No fracture is present.     CONCLUSION: Surgical changes of left knee arthroplasty.     This report was finalized on 3/3/2020 2:47 PM by Anuel Chapa MD.        Assessment:     1.  Bilateral knee joint osteoarthritis status post left knee joint total arthroplasty on 3/3/2020.  2.  Essential hypertension, chronic stable.  3.  Lone paroxysmal atrial fibrillation, currently in sinus rhythm.    Recommendations/Plan:     Mr. Mcgovern is currently comfortable at rest and hemodynamically stable.  He is in sinus rhythm at this time.  We will continue him on his home medications including his losartan and Cardizem.  He will be continued on low-dose aspirin therapy.  He is not a candidate for anticoagulation for his atrial fibrillation as his chads 2 score is 1.  However, he is currently on Arixtra for DVT prophylaxis.    Patient has been ambulatory today.  I will discontinue the Cr catheter at this time.  I have discussed this with the nursing staff.  I have advised the patient to use the incentive spirometry to prevent atelectasis.  We will repeat his blood work in the morning.  Discussed with the family members were at the bedside.  Thank you for allowing me to participate in the care of Mr. Mcgovern.    Raymon Cabrera MD  03/03/20  7:23 PM    Dictated utilizing Dragon dictation.

## 2020-03-04 NOTE — PLAN OF CARE
Problem: Patient Care Overview  Goal: Plan of Care Review  Flowsheets (Taken 3/4/2020 6499)  Outcome Summary: PT tolerated treatment well advancing gait distance to 256' cga with rw.  Pt did arsh one episode of L knee buckling  but pt was able to correct under own assistance. Pt partially completed HEP. PT was noted to have HR of 189 bpm and did decline with rest however did fluctuate from low 90's to 100 bpm.  Nurse was informed.  See flowsheet for details.

## 2020-03-04 NOTE — PLAN OF CARE
Problem: Patient Care Overview  Goal: Plan of Care Review  Outcome: Ongoing (interventions implemented as appropriate)  Flowsheets (Taken 3/4/2020 0912)  Progress: no change  Plan of Care Reviewed With: patient  Outcome Summary: Pt seen for OT evaluation today.  Pt able to walk 304' with sba using RW. Pt able to perform LB dressing tasks with supervision.  Pt has no skilled OT needs at this time.

## 2020-03-04 NOTE — PLAN OF CARE
Problem: Patient Care Overview  Goal: Plan of Care Review  3/4/2020 1550 by Vladimir Cao, PTA  Flowsheets (Taken 3/4/2020 1550)  Outcome Summary: Pt treatment held d/t partial DVT. Will resume treatment tomorrow.

## 2020-03-04 NOTE — CONSULTS
Patient: Benjie Mcgovern  Procedure(s):  total knee arthroplasty, Left  Anesthesia type: spinal, regional    Patient location: [facility] Surgical Floor  Last vitals:   Vitals:    03/04/20 0700   BP: 126/80   Pulse: 73   Resp: 16   Temp: 97.7 °F (36.5 °C)   SpO2: 98%     Level of consciousness: awake, alert and oriented    Post-anesthesia pain: adequate analgesia  Airway patency: patent  Respiratory: unassisted  Cardiovascular: stable and blood pressure at baseline  Hydration: euvolemic    Anesthetic complications: no

## 2020-03-04 NOTE — THERAPY TREATMENT NOTE
Acute Care - Physical Therapy Treatment Note  [facility]     Patient Name: Benjie Mcgovern  : 1965  MRN: [id number]  Today's Date: 3/4/2020  Onset of Illness/Injury or Date of Surgery: 20     Referring Physician: Dr. Garcia    Admit Date: 3/3/2020    Visit Dx:    ICD-10-CM ICD-9-CM   1. Impaired mobility and ADLs Z74.09 799.89   2. Primary osteoarthritis of left knee M17.12 715.16   3. Chronic pain of left knee M25.562 719.46    G89.29 338.29     Patient Active Problem List   Diagnosis   • Paroxysmal atrial fibrillation (CMS/HCC)   • Benign essential hypertension   • Gastroesophageal reflux disease with esophagitis   • Knee pain   • Impotence of organic origin   • Multiple-type hyperlipidemia   • Spasm   • Muscle pain   • Hand joint pain   • Pain in extremity   • Vitamin D deficiency   • Colon cancer screening   • Loose body in knee, left knee   • Primary osteoarthritis of left knee   • Chronic pain of left knee   • Status post total knee replacement using cement, left       Therapy Treatment    Rehabilitation Treatment Summary     Row Name 20 0914             Treatment Time/Intention    Discipline  physical therapy assistant  -RM      Document Type  therapy note (daily note)  -RM      Subjective Information  complains of;weakness  -RM      Mode of Treatment  physical therapy  -RM      Care Plan Review  care plan/treatment goals reviewed  -RM      Patient Effort  good  -RM      Existing Precautions/Restrictions  fall  -RM      Recorded by [RM] Vladimir Cao, PTA 20 1307      Row Name 20 0914             Vital Signs    Intratreatment Heart Rate (beats/min)  189  -RM      Posttreatment Heart Rate (beats/min)  91  -RM      Recorded by [RM] Vladimir Cao, PTA 20 1307      Row Name 2014             Safety Issues, Functional Mobility    Safety Issues Affecting Function (Mobility)  safety precaution awareness;positioning of assistive device;sequencing abilities  -RM       Impairments Affecting Function (Mobility)  balance;pain;strength  -RM      Recorded by [] Vladimir Cao, PTA 03/04/20 1307      Row Name 03/04/20 0914             Mobility Assessment/Intervention    Left Lower Extremity (Weight-bearing Status)  weight-bearing as tolerated (WBAT)  -RM      Recorded by [] Vladimir Cao, PTA 03/04/20 1307      Row Name 03/04/20 0914             Sit-Stand Transfer    Sit-Stand [place] (Transfers)  contact guard;verbal cues  -RM      Assistive Device (Sit-Stand Transfers)  walker, front-wheeled  -RM      Recorded by [] Vladimir Cao, PTA 03/04/20 1307      Row Name 03/04/20 0914             Stand-Sit Transfer    Stand-Sit [place] (Transfers)  contact guard;verbal cues  -RM      Assistive Device (Stand-Sit Transfers)  walker, front-wheeled  -RM      Recorded by [] Vladimir Cao, PTA 03/04/20 1307      Row Name 03/04/20 0914             Gait/Stairs Assessment/Training    [place] Level (Gait)  contact guard;verbal cues  -RM      Assistive Device (Gait)  walker, front-wheeled  -RM      Distance in Feet (Gait)  256  -RM      Pattern (Gait)  step-to;step-through  -RM      Deviations/Abnormal Patterns (Gait)  antalgic;base of support, wide  -RM      Left Sided Gait Deviations  knee buckling, left side;heel strike decreased;weight shift ability decreased  -RM      Recorded by [] Vladimir Cao, PTA 03/04/20 1307      Row Name 03/04/20 0914             Motor Skills Assessment/Interventions    Additional Documentation  Therapeutic Exercise (Group)  -RM      Recorded by [RM] Vladimir Cao, PTA 03/04/20 1307      Row Name 03/04/20 0914             Therapeutic Exercise    Lower Extremity (Therapeutic Exercise)  gastroc stretch, bilateral;heel slides, left;quad sets, left;SAQ (short arc quad), left  -RM      Exercise Type (Therapeutic Exercise)  AROM (active range of motion)  -RM      Position (Therapeutic Exercise)  seated  -RM      Sets/Reps  (Therapeutic Exercise)  x15  -RM      Recorded by [RM] Vladimir Cao, PTA 03/04/20 1307      Row Name 03/04/20 0914             Positioning and Restraints    Pre-Treatment Position  sitting in chair/recliner  -RM      Post Treatment Position  chair  -RM      In Chair  reclined;call light within reach;encouraged to call for assist;notified nsg  -RM      Recorded by [RM] Vladimir Cao, PTA 03/04/20 1307      Row Name 03/04/20 0914             Pain Scale: Numbers Pre/Post-Treatment    Pain Scale: Numbers, Pretreatment  0/10 - no pain  -RM      Pain Scale: Numbers, Post-Treatment  2/10  -RM      Pain Location - Side  Left  -RM      Pain Location  knee  -RM      Pre/Post Treatment Pain Comment  continued partial numbness of L LE   -RM      Pain Intervention(s)  Repositioned;Ambulation/increased activity;Cold applied  -RM      Recorded by [RM] Vladimir Coa, PTA 03/04/20 1307      Row Name                [REMOVED] Wound 09/27/18 1121 Left knee    Wound - Properties Group Date first assessed: 09/27/18 [PB] Time first assessed: 1121 [PB] Side: Left [PB] Location: knee [PB] Resolution Date: 03/04/20 [JM] Resolution Time: 1222 [JM] Type: incision [PB] Recorded by:  [JM] Katie Bernardo RN 03/04/20 1222 [PB] Lindsey Pham RN 09/27/18 1121    Row Name                Wound 03/03/20 1530 Left anterior knee Incision    Wound - Properties Group Date first assessed: 03/03/20 [JM] Time first assessed: 1530 [JM] Present on Hospital Admission: Y [JM] Side: Left [JM] Orientation: anterior [JM] Location: knee [JM] Primary Wound Type: Incision [JM] Recorded by:  [RAVINDRA] Katie Bernardo RN 03/04/20 1223    Row Name 03/04/20 0914             Plan of Care Review    Plan of Care Reviewed With  patient  -RM      Progress  improving  -RM      Outcome Summary  PT tolerated treatment well advancing gait distance to 256' cga with rw.  Pt did arsh one episode of L knee buckling  but pt was able to correct under own assistance. Pt  partially completed HEP. PT was noted to have HR of 189 bpm and did decline with rest however did fluctuate from low 90's to 100 bpm.  Nurse was informed.  See flowsheet for details.   -RM      Recorded by [] Vladimir Cao, PTA 03/04/20 1307      Row Name 03/04/20 0914             Outcome Summary/Treatment Plan (PT)    Daily Summary of Progress (PT)  progress toward functional goals is good  -RM      Plan for Continued Treatment (PT)  Cont PT per POC.   -RM      Recorded by [] Vladimir Cao, PTA 03/04/20 1307        User Key  (r) = Recorded By, (t) = Taken By, (c) = Cosigned By    Initials Name Effective Dates Discipline     Vladimir Cao, PTA 03/07/18 -  PT    Lindsey Gray RN 11/07/16 -  Nurse    Katie Torres RN - Nurse    Katie Torres RN 03/18/19 -  Nurse          Wound 03/03/20 1530 Left anterior knee Incision (Active)   Dressing Appearance dry;intact;no drainage 3/3/2020  3:30 PM   Closure SONAL 3/3/2020  3:30 PM               PT Recommendation and Plan     Outcome Summary/Treatment Plan (PT)  Daily Summary of Progress (PT): progress toward functional goals is good  Plan for Continued Treatment (PT): Cont PT per POC.   Plan of Care Reviewed With: patient  Progress: improving  Outcome Summary: PT tolerated treatment well advancing gait distance to 256' cga with rw.  Pt did arsh one episode of L knee buckling  but pt was able to correct under own assistance. Pt partially completed HEP. PT was noted to have HR of 189 bpm and did decline with rest however did fluctuate from low 90's to 100 bpm.  Nurse was informed.  See flowsheet for details.   Outcome Measures     Row Name 03/04/20 0914 03/04/20 0912          How much help from another person do you currently need...    Turning from your back to your side while in flat bed without using bedrails?  4  -RM  --     Moving from lying on back to sitting on the side of a flat bed without bedrails?  4  -RM  --     Moving to and from  a bed to a chair (including a wheelchair)?  3  -RM  --     Standing up from a chair using your arms (e.g., wheelchair, bedside chair)?  3  -RM  --     Climbing 3-5 steps with a railing?  3  -RM  --     To walk in hospital room?  3  -RM  --     AM-PAC 6 Clicks Score (PT)  20  -RM  --        How much help from another is currently needed...    Putting on and taking off regular lower body clothing?  --  3  -AH     Bathing (including washing, rinsing, and drying)  --  3  -AH     Toileting (which includes using toilet bed pan or urinal)  --  4  -AH     Putting on and taking off regular upper body clothing  --  4  -AH     Taking care of personal grooming (such as brushing teeth)  --  4  -AH     Eating meals  --  4  -AH     AM-PAC 6 Clicks Score (OT)  --  22  -AH        Functional Assessment    Outcome Measure Options  AM-PAC 6 Clicks Basic Mobility (PT)  -RM  AM-PAC 6 Clicks Daily Activity (OT)  -       User Key  (r) = Recorded By, (t) = Taken By, (c) = Cosigned By    Initials Name Provider Type     Maya Winters Occupational Therapist    Vladimir Le, SUYAPA Physical Therapy Assistant         Time Calculation:   PT Charges     Row Name 03/04/20 1309             Time Calculation    Start Time  0914  -RM      Stop Time  1006  -RM      Time Calculation (min)  52 min  -RM      PT Received On  03/04/20  -RM      PT Goal Re-Cert Due Date  03/13/20  -RM         Time Calculation- PT    Total Timed Code Minutes- PT  52 minute(s)  -RM         Timed Charges    15092 - PT Therapeutic Exercise Minutes  18  -RM      18836 - Gait Training Minutes   23  -RM      13857 - PT Therapeutic Activity Minutes  11  -RM        User Key  (r) = Recorded By, (t) = Taken By, (c) = Cosigned By    Initials Name Provider Type    Vladimir Le, SUYAPA Physical Therapy Assistant        Therapy Charges for Today     Code Description Service Date Service Provider Modifiers Qty    99477828113  PT THER PROC EA 15 MIN 3/4/2020 Vladimir Cao  SAM, PTA GP 1    48679709424 HC GAIT TRAINING EA 15 MIN 3/4/2020 Vladimir Cao, PTA GP 1    31506512253 HC PT THERAPEUTIC ACT EA 15 MIN 3/4/2020 Vladimir Cao, PTA GP 1          PT G-Codes  Outcome Measure Options: AM-PAC 6 Clicks Basic Mobility (PT)  AM-PAC 6 Clicks Score (PT): 20  AM-PAC 6 Clicks Score (OT): 22    Vladimir Cao PTA  3/4/2020

## 2020-03-04 NOTE — THERAPY DISCHARGE NOTE
"Acute Care - Occupational Therapy Initial Eval/Discharge   [facility]     Patient Name: Benjie Mcgovern  : 1965  MRN: [id number]  Today's Date: 3/4/2020  Onset of Illness/Injury or Date of Surgery: 20  Date of Referral to OT: 20  Referring Physician: Dr. Garcia      Admit Date: 3/3/2020       ICD-10-CM ICD-9-CM   1. Impaired mobility and ADLs Z74.09 799.89   2. Primary osteoarthritis of left knee M17.12 715.16   3. Chronic pain of left knee M25.562 719.46    G89.29 338.29     Patient Active Problem List   Diagnosis   • Paroxysmal atrial fibrillation (CMS/HCC)   • Benign essential hypertension   • Gastroesophageal reflux disease with esophagitis   • Knee pain   • Impotence of organic origin   • Multiple-type hyperlipidemia   • Spasm   • Muscle pain   • Hand joint pain   • Pain in extremity   • Vitamin D deficiency   • Colon cancer screening   • Loose body in knee, left knee   • Primary osteoarthritis of left knee   • Chronic pain of left knee   • Status post total knee replacement using cement, left     Past Medical History:   Diagnosis Date   • Arthritis     Left knee   • Atrial fibrillation (CMS/HCC)     REPORTS WAS DIAGNOSED IN  AND THAT HE HAS HAD NO EPISODES SINCE THAT TIME   • ED (erectile dysfunction) of non-organic origin    • Elevated cholesterol     REPORTS WAS TOLD \"BORDERLINE\" BUT THAT HE HAS NEVER TAKEN MEDICATION   • Foot pain    • History of exercise stress test     REPORTS WAS TOLD THAT ALL WAS WNL'S   • History of fracture     HISTORY OF LEFT WRIST AS A CHILD - HAD SURGICAL INTERVENTION   • History of kidney stones     REPORTS MULTIPLE EPISODES AND THAT HE HAS REQUIRED SURGICAL INTERVENTION IN THE PAST   • Hyperlipidemia    • Hypertension    • Impaired functional mobility, balance, gait, and endurance    • Wears contact lenses     INSTRUCTED TO LEAVE CONTACTS OUT THE DOS     Past Surgical History:   Procedure Laterality Date   • COLONOSCOPY N/A 2017    Procedure: " COLONOSCOPY WITH BXS, POLYPECTOMY;  Surgeon: Fermín Hamilton MD;  Location: [facility] ENDOSCOPY;  Service:    • FACIAL RECONSTRUCTION SURGERY      FROM DOG BITE AS A CHILD   • FRACTURE SURGERY Left     WRIST AS  A CHILD   • KIDNEY STONE SURGERY     • KNEE ARTHROSCOPY Left 9/27/2018    Procedure: Knee diagnostic arthroscopy, left with partial medial menisectomy, removal of loose bodies and two compartment chondroplasty;  Surgeon: Dean Garcia MD;  Location: [facility] OR;  Service: Orthopedics   • KNEE SURGERY Right 2015   • TOTAL KNEE ARTHROPLASTY Left 3/3/2020    Procedure: total knee arthroplasty, Left;  Surgeon: Dean Garcia MD;  Location: [facility] OR;  Service: Orthopedics;  Laterality: Left;          OT ASSESSMENT FLOWSHEET (last 12 hours)      Occupational Therapy Evaluation     Row Name 03/04/20 0912                   OT Evaluation Time/Intention    Subjective Information  no complaints  -        Document Type  discharge evaluation/summary  -        Mode of Treatment  occupational therapy  -        Patient Effort  good  -        Symptoms Noted During/After Treatment  increased pain  -           General Information    Patient Profile Reviewed?  yes  -        Onset of Illness/Injury or Date of Surgery  03/03/20  -        Referring Physician  Dr. Garcia  -        Patient Observations  alert;cooperative;agree to therapy  -        Patient/Family Observations  Pt alone  -        General Observations of Patient  Pt received sitting reclined in his chair  -        Prior Level of Function  independent:;community mobility;ADL's  -        Pertinent History of Current Functional Problem  OA L knee s/p L TKA  -        Existing Precautions/Restrictions  fall  -        Risks Reviewed  patient:;increased discomfort  -        Benefits Reviewed  patient:;improve function;increase independence;increase strength  -           Relationship/Environment    Lives With  spouse  -            Resource/Environmental Concerns    Current Living Arrangements  home/apartment/condo  -           Home Main Entrance    Number of Stairs, Main Entrance  four  -        Surface of Stairs, Main Entrance  concrete  -        Stair Railings, Main Entrance  none  -           Stairs Within Home, Primary    Stairs, Within Home, Primary  to basement, pt able to stay on main level  -           Cognitive Assessment/Intervention- PT/OT    Orientation Status (Cognition)  oriented x 4  -        Follows Commands (Cognition)  WFL  -           Safety Issues, Functional Mobility    Impairments Affecting Function (Mobility)  pain;strength  -           Mobility Assessment/Treatment    Extremity Weight-bearing Status  left lower extremity  -        Left Lower Extremity (Weight-bearing Status)  weight-bearing as tolerated (WBAT)  -           Bed Mobility Assessment/Treatment    Comment (Bed Mobility)  Pt up in his chair  -           Functional Mobility    Functional Mobility- Ind. Level  standby assist  -        Functional Mobility- Device  rolling walker  -        Functional Mobility-Distance (Feet)  304  -           Transfer Assessment/Treatment    Transfer Assessment/Treatment  sit-stand transfer;stand-sit transfer  -           Sit-Stand Transfer    Sit-Stand [place] (Transfers)  contact guard  -        Assistive Device (Sit-Stand Transfers)  walker, front-wheeled  -           Stand-Sit Transfer    Stand-Sit [place] (Transfers)  contact guard  -        Assistive Device (Stand-Sit Transfers)  walker, front-wheeled  -           ADL Assessment/Intervention    BADL Assessment/Intervention  bathing;upper body dressing;lower body dressing;grooming;feeding;toileting  -           Bathing Assessment/Intervention    Bathing [place] Level  set up  -           Upper Body Dressing Assessment/Training    Upper Body Dressing [place] Level  independent  -           Lower Body Dressing  Assessment/Training    Lower Body Dressing [place] Level  set up  -           Grooming Assessment/Training    [place] Level (Grooming)  set up  -           Self-Feeding Assessment/Training    [place] Level (Feeding)  independent  -           Toileting Assessment/Training    [place] Level (Toileting)  supervision  -           BADL Safety/Performance    Impairments, BADL Safety/Performance  range of motion  -           General ROM    GENERAL ROM COMMENTS  BUE WFL  -           MMT (Manual Muscle Testing)    General MMT Comments  [place] WFL  -           Positioning and Restraints    Pre-Treatment Position  sitting in chair/recliner  -        Post Treatment Position  chair  -        In Chair  reclined;call light within reach;encouraged to call for assist;with PT  -           Pain Scale: Numbers Pre/Post-Treatment    Pain Scale: Numbers, Pretreatment  0/10 - no pain  -        Pain Scale: Numbers, Post-Treatment  0/10 - no pain  -           Coping    Observed Emotional State  accepting;cooperative  -        Verbalized Emotional State  acceptance  -           Plan of Care Review    Plan of Care Reviewed With  patient  -        Progress  no change  -        Outcome Summary  Pt seen for OT evaluation today.  Pt able to walk 304' with sba using RW. Pt able to perform LB dressing tasks with supervision.  Pt has no skilled OT needs at this time.  -           Clinical Impression (OT)    Date of Referral to OT  03/03/20  -        OT Diagnosis  ADL decline  -        Patient/Family Goals Statement (OT Eval)  Pt wants to d/c home with home health  -        Criteria for Skilled Therapeutic Interventions Met (OT Eval)  no problems identified which require skilled intervention  -        Therapy Frequency (OT Eval)  evaluation only  -        Care Plan Review (OT)  evaluation/treatment results reviewed;patient/other agree to care plan  -        Anticipated Discharge  Disposition (OT)  home with home health  -           Living Environment    Home Accessibility  stairs to enter home;stairs within home  -          User Key  (r) = Recorded By, (t) = Taken By, (c) = Cosigned By    Initials Name Effective Dates    Maya Julian 03/07/18 -               OT Recommendation and Plan  Outcome Summary/Treatment Plan (OT)  Anticipated Discharge Disposition (OT): home with home health  Therapy Frequency (OT Eval): evaluation only  Plan of Care Review  Plan of Care Reviewed With: patient  Plan of Care Reviewed With: patient  Outcome Summary: Pt seen for OT evaluation today.  Pt able to walk 304' with sba using RW. Pt able to perform LB dressing tasks with supervision.  Pt has no skilled OT needs at this time.         Outcome Measures     Row Name 03/04/20 0912             How much help from another is currently needed...    Putting on and taking off regular lower body clothing?  3  -AH      Bathing (including washing, rinsing, and drying)  3  -AH      Toileting (which includes using toilet bed pan or urinal)  4  -AH      Putting on and taking off regular upper body clothing  4  -AH      Taking care of personal grooming (such as brushing teeth)  4  -AH      Eating meals  4  -AH      AM-PAC 6 Clicks Score (OT)  22  -         Functional Assessment    Outcome Measure Options  AM-PAC 6 Clicks Daily Activity (OT)  -        User Key  (r) = Recorded By, (t) = Taken By, (c) = Cosigned By    Initials Name Provider Type    Maya Julian Occupational Therapist          Time Calculation:   Time Calculation- OT     Row Name 03/04/20 1027             Time Calculation- OT    OT Start Time  0912  -      OT Received On  03/04/20  -        User Key  (r) = Recorded By, (t) = Taken By, (c) = Cosigned By    Initials Name Provider Type    Maya Julian Occupational Therapist        Therapy Suggested Charges     Code   Minutes Charges    None           Therapy Charges for Today     Code  Description Service Date Service Provider Modifiers Qty    23639392315 HC OT EVAL LOW COMPLEXITY 3 3/4/2020 Maya Winters GO 1               OT Discharge Summary  Anticipated Discharge Disposition (OT): home with home health    Maya Winters  3/4/2020

## 2020-03-04 NOTE — PROGRESS NOTES
"      [facility]  PROGRESS NOTE    Name:  Benjie Mcgovern   Age:  54 y.o.  Sex:  male  :  1965  MRN:  [id number]   Visit Number:  [id number]  Admission Date:  3/3/2020  Date Of Service:  20  Primary Care Physician:  Evie Martin MD     LOS: 0 days :  Patient Care Team:  Evie Martin MD as PCP - General:    Chief Complaint:      Patient being evaluated POD # 1 LEFT TKA    Subjective / Interval History:     Patient is postop day #1 elective left total knee arthroplasty.  Patient states his pain is well controlled. He does note some left calf pain and \"swelling\"  He denies chest pain, shortness of breath, dizziness or palpitations.  He tells me that this morning when working with physical therapy they checked his heart rate for routine vitals and noted his heart rate to be in the 180s.  The hospitalist was contacted and an EKG with blood work was ordered immediately.  EKG showed sinus rhythm without acute changes.  Patient denied having any trouble breathing or palpitations when his heart rate was noted to be in the 180s.  He thinks his heart rate was elevated because the pulse ox monitor was not on his finger \"good enough\"    Review of Systems:     General ROS: No fever spike, no acute cognitive change.  Ophthalmic ROS: no acute visual disturbance.  ENT ROS: No acute sinus congestion.   Respiratory ROS: No new shortness of breath.   Cardiovascular ROS: No new chest pain.  Gastrointestinal ROS: No acute abdominal change. Non-distended.  Genito-Urinary ROS: No reported dysuria.  Musculoskeletal ROS: No deep calf pain. No acute focal motor deficit  Neurological ROS: No new numbness or tingling.  Dermatological ROS: No erythema.  No cyanosis.  No rash or pruritis.    Vital Signs:    Temp:  [97.7 °F (36.5 °C)-98.2 °F (36.8 °C)] 98.2 °F (36.8 °C)  Heart Rate:  [73-88] 73  Resp:  [14-18] 18  BP: (110-139)/(73-84) 119/74    Intake and output:    I/O last 3 completed shifts:  In: 4417 [P.O.:720; " I.V.:3697]  Out: 2450 [Urine:2450]  I/O this shift:  In: 360 [P.O.:360]  Out: 400 [Urine:400]    Physical Examination:    General Appearance:    Alert, cooperative, and no acute distress.   Head:    Atraumatic and normocephalic, without obvious abnormality.   Eyes:    Extraocular movements are within normal limits.   ENT:   No acute change.   Neck:   Supple, trachea midline.   Lungs:     Normal respirations, unlabored.    Heart:    Regular rate.   Abdomen:     Soft, non-distended.   Extremities:   No new motor deficit, no calf pain, Homans sign + Left calf pain   Skin:     No rash.  No cyanosis.  No erythema.  No active drainage.       Neurologic:   Sensation intact, pulses intact.     Laboratory results:    Lab Results (last 24 hours)     Procedure Component Value Units Date/Time    Troponin [[id number]]  (Normal) Collected:  03/04/20 1023    Specimen:  Blood Updated:  03/04/20 1137     Troponin T <0.010 ng/mL     Narrative:       Troponin T Reference Range:  <= 0.03 ng/mL-   Negative for AMI  >0.03 ng/mL-     Abnormal for myocardial necrosis.  Clinicians would have to utilize clinical acumen, EKG, Troponin and serial changes to determine if it is an Acute Myocardial Infarction or myocardial injury due to an underlying chronic condition.       Results may be falsely decreased if patient taking Biotin.      Basic Metabolic Panel [[id number]]  (Abnormal) Collected:  03/04/20 0546    Specimen:  Blood Updated:  03/04/20 0642     Glucose 136 mg/dL      BUN 16 mg/dL      Creatinine 0.70 mg/dL      Sodium 136 mmol/L      Potassium 4.2 mmol/L      Chloride 103 mmol/L      CO2 21.0 mmol/L      Calcium 8.7 mg/dL      eGFR Non African Amer 118 mL/min/1.73      BUN/Creatinine Ratio 22.9     Anion Gap 12.0 mmol/L     Narrative:       GFR Normal >60  Chronic Kidney Disease <60  Kidney Failure <15      CBC & Differential [[id number]] Collected:  03/04/20 0546    Specimen:  Blood Updated:  03/04/20 0616    Narrative:       The  following orders were created for panel order CBC & Differential.  Procedure                               Abnormality         Status                     ---------                               -----------         ------                     CBC Auto Differential[[id number]]        Abnormal            Final result                 Please view results for these tests on the individual orders.    CBC Auto Differential [[id number]]  (Abnormal) Collected:  03/04/20 0546    Specimen:  Blood Updated:  03/04/20 0616     WBC 18.61 10*3/mm3      RBC 4.82 10*6/mm3      Hemoglobin 14.7 g/dL      Hematocrit 43.5 %      MCV 90.2 fL      MCH 30.5 pg      MCHC 33.8 g/dL      RDW 12.2 %      RDW-SD 40.2 fl      MPV 11.0 fL      Platelets 261 10*3/mm3      Neutrophil % 83.0 %      Lymphocyte % 8.5 %      Monocyte % 8.0 %      Eosinophil % 0.0 %      Basophil % 0.1 %      Immature Grans % 0.4 %      Neutrophils, Absolute 15.44 10*3/mm3      Lymphocytes, Absolute 1.59 10*3/mm3      Monocytes, Absolute 1.49 10*3/mm3      Eosinophils, Absolute 0.00 10*3/mm3      Basophils, Absolute 0.02 10*3/mm3      Immature Grans, Absolute 0.07 10*3/mm3      nRBC 0.0 /100 WBC           I have reviewed the patient's laboratory results.    Radiology results:    Imaging Results (Last 24 Hours)     Procedure Component Value Units Date/Time    CT Chest Pulmonary Embolism [[id number]] Resulted:  03/04/20 1558     Updated:  03/04/20 1558    US Venous Doppler Lower Extremity Left (duplex) [[id number]] Collected:  03/04/20 1451     Updated:  03/04/20 1454    Narrative:       PROCEDURE: US VENOUS DOPPLER LOWER EXTREMITY LEFT (DUPLEX)-     HISTORY: left calf pain and swelling s/p left tka; Z74.09-Other reduced  mobility; M17.12-Unilateral primary osteoarthritis, left knee;  M25.562-Pain in left knee; G89.29-Other chronic pain     Multiple transverse and longitudinal scans were performed of the  femoropopliteal deep venous system, with augmentation and  compression  maneuvers.     FINDINGS: The femoral and popliteal veins are patent. Normal flow is  noted by Doppler exam.     Assessment of the calf vein shows partial thrombosis of the posterior  tibial vein. Anterior tibial and peroneal veins are patent by Doppler  exam.       Impression:       Isolated calf vein thrombosis. Consider follow-up duplex  examination 5-7 days to assess for potential propagation     This report was finalized on 3/4/2020 2:52 PM by Anuel Chapa MD.          I have reviewed the patient's radiology reports.    AP and lateral taken perioperative shows no acute concern.  Good position and alignment of implants and/or fracture.       Assessment/Plan      Paroxysmal atrial fibrillation (CMS/HCC)    Benign essential hypertension    Primary osteoarthritis of left knee    Chronic pain of left knee    Status post total knee replacement using cement, left      S/P Left TKA:    Continue current management per the detailed orders and instructions, including skin, safety and fall precautions, respiratory therapy with incentive spirometer, advance diet as tolerated, bowel regimen, multi-modal analgesia, DVT prophylaxis, Hospitalist medical management, PT/OT following post-surgical rehab protocol, and Case Management for discharge and rehabilitation plans.    Do not remove the initial tan post op dressing.   Will keep patient overnight to monitor on telemetry   Will order STAT venous doppler LLE.    Spoke with Liza Becker regarding the US report. She has consulted with Dr. Bridges and recommends Eliquis 5 mg twice daily for 10 days.  Repeat the venous Doppler in 10 days to make certain the DVT has not migrated/enlarged.  If this is improved he can resume aspirin 325 for 30 days and follow up with PCP    Cecil Tellez PA-C  03/04/20  4:00 PM

## 2020-03-05 ENCOUNTER — READMISSION MANAGEMENT (OUTPATIENT)
Dept: CALL CENTER | Facility: HOSPITAL | Age: 55
End: 2020-03-05

## 2020-03-05 ENCOUNTER — APPOINTMENT (OUTPATIENT)
Dept: CARDIOLOGY | Facility: HOSPITAL | Age: 55
End: 2020-03-05

## 2020-03-05 VITALS
TEMPERATURE: 98.1 F | HEART RATE: 85 BPM | WEIGHT: 244 LBS | HEIGHT: 72 IN | OXYGEN SATURATION: 93 % | RESPIRATION RATE: 18 BRPM | BODY MASS INDEX: 33.05 KG/M2 | SYSTOLIC BLOOD PRESSURE: 135 MMHG | DIASTOLIC BLOOD PRESSURE: 68 MMHG

## 2020-03-05 LAB
ANION GAP SERPL CALCULATED.3IONS-SCNC: 11.1 MMOL/L (ref 5–15)
BASOPHILS # BLD AUTO: 0.04 10*3/MM3 (ref 0–0.2)
BASOPHILS NFR BLD AUTO: 0.3 % (ref 0–1.5)
BUN BLD-MCNC: 22 MG/DL (ref 6–20)
BUN/CREAT SERPL: 25 (ref 7–25)
CALCIUM SPEC-SCNC: 8.7 MG/DL (ref 8.6–10.5)
CHLORIDE SERPL-SCNC: 101 MMOL/L (ref 98–107)
CO2 SERPL-SCNC: 24.9 MMOL/L (ref 22–29)
CREAT BLD-MCNC: 0.88 MG/DL (ref 0.76–1.27)
DEPRECATED RDW RBC AUTO: 42.2 FL (ref 37–54)
EOSINOPHIL # BLD AUTO: 0.02 10*3/MM3 (ref 0–0.4)
EOSINOPHIL NFR BLD AUTO: 0.1 % (ref 0.3–6.2)
ERYTHROCYTE [DISTWIDTH] IN BLOOD BY AUTOMATED COUNT: 12.7 % (ref 12.3–15.4)
GFR SERPL CREATININE-BSD FRML MDRD: 91 ML/MIN/1.73
GLUCOSE BLD-MCNC: 118 MG/DL (ref 65–99)
HCT VFR BLD AUTO: 39.6 % (ref 37.5–51)
HGB BLD-MCNC: 13.3 G/DL (ref 13–17.7)
IMM GRANULOCYTES # BLD AUTO: 0.05 10*3/MM3 (ref 0–0.05)
IMM GRANULOCYTES NFR BLD AUTO: 0.4 % (ref 0–0.5)
LYMPHOCYTES # BLD AUTO: 2.14 10*3/MM3 (ref 0.7–3.1)
LYMPHOCYTES NFR BLD AUTO: 15.4 % (ref 19.6–45.3)
MAGNESIUM SERPL-MCNC: 2.2 MG/DL (ref 1.6–2.6)
MAXIMAL PREDICTED HEART RATE: 167 BPM
MCH RBC QN AUTO: 30.6 PG (ref 26.6–33)
MCHC RBC AUTO-ENTMCNC: 33.6 G/DL (ref 31.5–35.7)
MCV RBC AUTO: 91.2 FL (ref 79–97)
MONOCYTES # BLD AUTO: 2.08 10*3/MM3 (ref 0.1–0.9)
MONOCYTES NFR BLD AUTO: 15 % (ref 5–12)
NEUTROPHILS # BLD AUTO: 9.58 10*3/MM3 (ref 1.7–7)
NEUTROPHILS NFR BLD AUTO: 68.8 % (ref 42.7–76)
NRBC BLD AUTO-RTO: 0 /100 WBC (ref 0–0.2)
PLATELET # BLD AUTO: 229 10*3/MM3 (ref 140–450)
PMV BLD AUTO: 11.3 FL (ref 6–12)
POTASSIUM BLD-SCNC: 4.2 MMOL/L (ref 3.5–5.2)
RBC # BLD AUTO: 4.34 10*6/MM3 (ref 4.14–5.8)
SODIUM BLD-SCNC: 137 MMOL/L (ref 136–145)
STRESS TARGET HR: 142 BPM
TROPONIN T SERPL-MCNC: <0.01 NG/ML (ref 0–0.03)
WBC NRBC COR # BLD: 13.91 10*3/MM3 (ref 3.4–10.8)

## 2020-03-05 PROCEDURE — G0378 HOSPITAL OBSERVATION PER HR: HCPCS

## 2020-03-05 PROCEDURE — 99213 OFFICE O/P EST LOW 20 MIN: CPT | Performed by: NURSE PRACTITIONER

## 2020-03-05 PROCEDURE — 84484 ASSAY OF TROPONIN QUANT: CPT | Performed by: INTERNAL MEDICINE

## 2020-03-05 PROCEDURE — 97116 GAIT TRAINING THERAPY: CPT

## 2020-03-05 PROCEDURE — 85025 COMPLETE CBC W/AUTO DIFF WBC: CPT | Performed by: NURSE PRACTITIONER

## 2020-03-05 PROCEDURE — 25010000002 MAGNESIUM SULFATE 2 GM/50ML SOLUTION: Performed by: NURSE PRACTITIONER

## 2020-03-05 PROCEDURE — 99024 POSTOP FOLLOW-UP VISIT: CPT | Performed by: PHYSICIAN ASSISTANT

## 2020-03-05 PROCEDURE — 93306 TTE W/DOPPLER COMPLETE: CPT

## 2020-03-05 PROCEDURE — 83735 ASSAY OF MAGNESIUM: CPT | Performed by: NURSE PRACTITIONER

## 2020-03-05 PROCEDURE — 80048 BASIC METABOLIC PNL TOTAL CA: CPT | Performed by: NURSE PRACTITIONER

## 2020-03-05 RX ORDER — PSEUDOEPHEDRINE HCL 30 MG
100 TABLET ORAL 2 TIMES DAILY PRN
Qty: 14 EACH | Refills: 0 | Status: SHIPPED | OUTPATIENT
Start: 2020-03-05 | End: 2020-06-25

## 2020-03-05 RX ORDER — LOSARTAN POTASSIUM 50 MG/1
25 TABLET ORAL DAILY
Qty: 90 TABLET | Refills: 2
Start: 2020-03-05 | End: 2020-06-24 | Stop reason: SDUPTHER

## 2020-03-05 RX ORDER — HYDROCODONE BITARTRATE AND ACETAMINOPHEN 7.5; 325 MG/1; MG/1
1 TABLET ORAL EVERY 6 HOURS PRN
Qty: 28 TABLET | Refills: 0 | Status: SHIPPED | OUTPATIENT
Start: 2020-03-05 | End: 2020-06-24

## 2020-03-05 RX ORDER — MAGNESIUM SULFATE HEPTAHYDRATE 40 MG/ML
2 INJECTION, SOLUTION INTRAVENOUS ONCE
Status: COMPLETED | OUTPATIENT
Start: 2020-03-05 | End: 2020-03-05

## 2020-03-05 RX ADMIN — ASPIRIN 81 MG: 81 TABLET, COATED ORAL at 09:06

## 2020-03-05 RX ADMIN — HYDROCODONE BITARTRATE AND ACETAMINOPHEN 1 TABLET: 7.5; 325 TABLET ORAL at 04:32

## 2020-03-05 RX ADMIN — HYDROCODONE BITARTRATE AND ACETAMINOPHEN 1 TABLET: 7.5; 325 TABLET ORAL at 10:39

## 2020-03-05 RX ADMIN — MAGNESIUM SULFATE HEPTAHYDRATE 2 G: 40 INJECTION, SOLUTION INTRAVENOUS at 08:45

## 2020-03-05 RX ADMIN — LOSARTAN POTASSIUM 50 MG: 50 TABLET, FILM COATED ORAL at 09:06

## 2020-03-05 RX ADMIN — SODIUM CHLORIDE, PRESERVATIVE FREE 3 ML: 5 INJECTION INTRAVENOUS at 09:06

## 2020-03-05 RX ADMIN — APIXABAN 5 MG: 5 TABLET, FILM COATED ORAL at 09:06

## 2020-03-05 NOTE — PROGRESS NOTES
[facility]    Acute pain service Inpatient Progress Note    Patient Name: Benjie Mcgovern  :  1965  MRN:  [id number]        Acute Pain  Service Inpatient Progress Note:    Analgesia:Good  Pain Score:0/10  LOC: alert and awake  Resp Status: room air  Cardiac: VS stable  Side Effects:None  Catheter Site:clean  Cath type: peripheral nerve cath with ON Q  Infusion rate: 10ml/hr  Catheter Plan:Catheter to remain Insitu and Continue catheter infusion rate unchanged        Rate was at 0ml/hr. Asked rn to increse to 14ml/hr for one hour then reduce to basal rate of 6ml/hr

## 2020-03-05 NOTE — PROGRESS NOTES
PROGRESS NOTE        Date of Admission: 3/3/2020  Length of Stay: 0  Primary Care Physician: Evie Martin MD    Subjective   Chief Complaint:  Follow up left total knee replacement  HPI: This is a 54-year-old male with past medical history significant for bilateral knee joint osteoarthritis, hypertension, and atrial fibrillation who was admitted 3/3/20 after undergoing a left total knee replacement performed by Dr. Garcia.  Patient had no events overnight.  He denies chest pain, SOA, abdominal pain, nausea or vomiting.  PT/OT consulted for strength and mobility.  He was diagnosed with 1 episode of atrial fibrillation several years ago and was placed on Cardizem at that time.  He has had no further episodes to his knowledge since that time however today while up with therapy patients heart rate did reach 180s per PT.   Patient was noted to have some edema in his left calf yesterday for which an ultrasound was done which showed a partial thrombosis of the posterior tibial vein.  They did recommend duplex follow-up in 5 to 7 days to assess for potential propagation.  Given that the patient had some tachycardia previously in the morning a CT PE protocol was done which was negative for PE.  Patient was placed on telemetry overnight for which he was noted to have an 8 beat run of V. tach this morning.  I will get a 2D echo and if his LV is normal then we will plan to discharge him.  I have recommended that he follow-up with cardiology as an outpatient for an event monitor.  His troponin was negative.  He denies any chest pain, shortness breath, abdominal pain, nausea or vomiting.           Review Of Systems:   Review of Systems  General ROS: Patient denies any fevers, chills or loss of consciousness.    ENT ROS: Denies sore throat, nasal congestion or ear pain.   Respiratory ROS: Denies cough or shortness of breath.   Cardiovascular ROS: Denies chest pain or palpitations. No history of exertional chest pain.      Gastrointestinal ROS: Denies nausea and vomiting. Denies any abdominal pain. No diarrhea.  Genito-Urinary ROS: Denies dysuria or hematuria.  Musculoskeletal ROS: Denies back pain. No muscle pain. No calf pain.   Neurological ROS: Denies any focal weakness. No loss of consciousness. Denies any numbness. Denies neck pain.   Dermatological ROS: Denies any redness or pruritis.    Objective      Temp:  [98.2 °F (36.8 °C)-99.8 °F (37.7 °C)] 99.2 °F (37.3 °C)  Heart Rate:  [82-91] 82  Resp:  [16-18] 16  BP: (112-123)/(67-77) 114/77  Physical Exam    General Appearance:  Alert and cooperative, not in any acute distress.   Head:  Atraumatic and normocephalic, without obvious abnormality.   Eyes:          PERRLA, conjunctivae and sclerae normal, no Icterus. No pallor. Extraocular movements are within normal limits.   Ears:  Ears appear intact with no abnormalities noted.   Throat: No oral lesions, no thrush, oral mucosa moist.   Neck: Supple, trachea midline, no thyromegaly, no carotid bruit.       Lungs:   Chest shape is normal. Breath sounds heard bilaterally equally.  No crackles or wheezing. No Pleural rub or bronchial breathing.   Heart:  Normal S1 and S2, no murmur, no gallop, no rub. No JVD   Abdomen:   Normal bowel sounds, no masses, no organomegaly. Soft    non-tender, non-distended, no guarding, no rebound             tenderness   Extremities: Moves all extremities well,  Edema noted to left calf, no cyanosis, positive pulses.  Dressing left knee intact.   Pulses: Pulses palpable and equal bilaterally   Skin: No bleeding, bruising or rash       Neurologic:    Psychiatric/Behavior:     Cranial nerves 2 - 12 grossly intact, sensation intact, Motor power is normal and equal bilaterally.  Mood normal, behavior normal       Results Review:    I have reviewed the labs, radiology results and diagnostic studies.    Results from last 7 days   Lab Units 03/05/20  0541   WBC 10*3/mm3 13.91*   HEMOGLOBIN g/dL 13.3   PLATELETS  10*3/mm3 229     Results from last 7 days   Lab Units 03/04/20  0546   SODIUM mmol/L 136   POTASSIUM mmol/L 4.2   CO2 mmol/L 21.0*   CREATININE mg/dL 0.70*   GLUCOSE mg/dL 136*       Culture Data: No results found for: BLOODCX, URINECX, WOUNDCX, MRSACX, RESPCX, STOOLCX  Radiology Data:   Cardiology Data:    I have reviewed the medications.    Assessment/Plan     Assessment/Plan:    1.  Left calf partial thrombosis of the posterior tibial vein-I will go ahead and start patient on Eliquis 5 mg twice daily since he has had a left total knee replacement and is high risk of getting a DVT.  CT PE protocol was done which was negative.  He has been placed on Eliquis 5 mg twice daily to continue for 7 to 10 days.  He will need a repeat ultrasound duplex of the left lower extremity when he follows up with Dr. Garcia.  If there is no extension of the DVT then patient can discontinue the anticoagulation and continued therapy per orthopedics.    2.  Wide-complex tachycardia-patient did have an 8 beat run of wide-complex tachycardia this morning.  I have ordered a magnesium and will give 2 g of IV magnesium.  I have also ordered a 2D echo.  If the LV is normal then we can plan to discharge the patient home and have him follow-up with cardiology as an outpatient for an event monitor.    3.  Bilateral knee joint osteoarthritis status post left total knee replacement performed on 3/320 by Dr. Garcia with orthopedics.  Patient is on analgesics, DVT prophylaxis.  PT OT has been consulted and case management for discharge planning    4.  Chronic essential hypertension-blood pressure stable continue home antihypertensive medications    5.  Paroxysmal atrial fibrillation x1 episode several years ago-currently sinus rhythm.  Patient is not on anticoagulation.  He has a chads 2 score of 1.  He is on Arixtra for DVT prophylaxis.    6.  Leukocytosis-  Likely secondary to steroids.      7.  Tachycardia with exertion-  EKG showed no change  from prior and sinus rhythm.  Place on telemetry.      DVT prophylaxis:  Arixtra  Discharge Planning:   JOEL Escalante 03/05/20 7:36 AM

## 2020-03-05 NOTE — PROGRESS NOTES
Continued Stay Note  [facility]     Patient Name: Benjie Mcgovern  MRN: [id number]  Today's Date: 3/5/2020    Admit Date: 3/3/2020    Discharge Plan     Row Name 03/05/20 1302       Plan    Plan  Received order's for DME and HH.  Called to Emmanuelle at [facility] who services [facility].  Order Faxed to [phone] Phone no is [phone]. Called to Joelle at [facility] to [facility] and will take care of coupons for Oriana.      Called to Symone at [facility] in [place] and [facility] receive order and will see pt tomorrow.            Discharge Codes    No documentation.       Expected Discharge Date and Time     Expected Discharge Date Expected Discharge Time    Mar 5, 2020             Inna Trujillo RN

## 2020-03-05 NOTE — PLAN OF CARE
Problem: Patient Care Overview  Goal: Plan of Care Review  Flowsheets (Taken 3/5/2020 1033)  Outcome Summary: Pt tolerates stair training well using  axillary crutches to ascend and descend.  Pt reports has crutches at home. Pt was also able to ambulate 156' cga with rw.  Reviewed HEP program with pt.  See flowsheet for details.

## 2020-03-05 NOTE — PLAN OF CARE
Problem: Patient Care Overview  Goal: Plan of Care Review  Outcome: Ongoing (interventions implemented as appropriate)  Flowsheets (Taken 3/5/2020 0251)  Plan of Care Reviewed With: patient  Note:   PO PAIN MEDICATION EFFECTIVE.ON Q PUMP.RESTED WELL.

## 2020-03-05 NOTE — THERAPY TREATMENT NOTE
Acute Care - Physical Therapy Treatment Note  [facility]     Patient Name: Benjie Mcgovern  : 1965  MRN: [id number]  Today's Date: 3/5/2020  Onset of Illness/Injury or Date of Surgery: 20     Referring Physician: Dr. Garcia    Admit Date: 3/3/2020    Visit Dx:    ICD-10-CM ICD-9-CM   1. Impaired mobility and ADLs Z74.09 799.89   2. Primary osteoarthritis of left knee M17.12 715.16   3. Chronic pain of left knee M25.562 719.46    G89.29 338.29   4. Acute deep vein thrombosis (DVT) of tibial vein of left lower extremity (CMS/Formerly [facility]) I82.442 453.42   5. SVT (supraventricular tachycardia) (CMS/Formerly [facility]) I47.1 427.89     Patient Active Problem List   Diagnosis   • Paroxysmal atrial fibrillation (CMS/Formerly [facility])   • Benign essential hypertension   • Gastroesophageal reflux disease with esophagitis   • Knee pain   • Impotence of organic origin   • Multiple-type hyperlipidemia   • Spasm   • Muscle pain   • Hand joint pain   • Pain in extremity   • Vitamin D deficiency   • Colon cancer screening   • Loose body in knee, left knee   • Primary osteoarthritis of left knee   • Chronic pain of left knee   • Status post total knee replacement using cement, left       Therapy Treatment    Rehabilitation Treatment Summary     Row Name 20 1033             Treatment Time/Intention    Discipline  physical therapy assistant  -RM      Document Type  therapy note (daily note)  -RM      Subjective Information  complains of;pain;weakness  -RM      Mode of Treatment  physical therapy  -RM      Care Plan Review  care plan/treatment goals reviewed  -RM      Patient Effort  adequate  -RM      Existing Precautions/Restrictions  fall  -RM      Recorded by [RM] Vladimir Cao, PTA 20 1312      Row Name 20 1033             Safety Issues, Functional Mobility    Safety Issues Affecting Function (Mobility)  positioning of assistive device  -RM      Impairments Affecting Function (Mobility)  balance;endurance/activity  tolerance;pain;strength  -RM      Recorded by [RM] Vladimir Cao, PTA 03/05/20 1312      Row Name 03/05/20 1033             Mobility Assessment/Intervention    Left Lower Extremity (Weight-bearing Status)  weight-bearing as tolerated (WBAT)  -RM      Recorded by [RM] Vladimir Cao, PTA 03/05/20 1312      Row Name 03/05/20 1033             Bed Mobility Assessment/Treatment    Supine-Sit [place]one[place] (Bed Mobility)  supervision  -RM      Assistive Device (Bed Mobility)  head of bed elevated  -RM      Recorded by [RM] Vladimir Cao, PTA 03/05/20 1312      Row Name 03/05/20 1033             Sit-Stand Transfer    Sit-Stand [place]onejos (Transfers)  contact guard;verbal cues  -RM      Assistive Device (Sit-Stand Transfers)  walker, front-wheeled  -RM      Recorded by [RM] Vladimir Cao, PTA 03/05/20 1312      Row Name 03/05/20 1033             Stand-Sit Transfer    Stand-Sit [place]one[place] (Transfers)  contact guard;verbal cues  -RM      Assistive Device (Stand-Sit Transfers)  walker, front-wheeled  -RM      Recorded by [RM] Vladimir Cao, PTA 03/05/20 1312      Row Name 03/05/20 1033             Gait/Stairs Assessment/Training    Conejos Level (Gait)  contact guard;stand by assist  -RM      Assistive Device (Gait)  walker, front-wheeled  -RM      Distance in Feet (Gait)  156  -RM      Pattern (Gait)  step-to;step-through  -RM      Deviations/Abnormal Patterns (Gait)  antalgic;base of support, wide  -RM      Left Sided Gait Deviations  heel strike decreased;forward flexed posture;weight shift ability decreased  -RM      Conejos Level (Stairs)  contact guard  -RM      Assistive Device (Stairs)  crutches, axillary  -RM      Number of Steps (Stairs)  12  -RM      Ascending Technique (Stairs)  step-to-step  -RM      Descending Technique (Stairs)  step-to-step  -RM      Stairs, Safety Issues  sequencing ability decreased  -RM      Stairs, Impairments  pain  -RM      Recorded by [RM] Vladimir Cao  SAM, PTA 03/05/20 1312      Row Name 03/05/20 1033             Positioning and Restraints    Pre-Treatment Position  in bed  -RM      Post Treatment Position  chair  -RM      In Chair  reclined;call light within reach;encouraged to call for assist;notified nsg  -RM      Recorded by [] Vladimir Cao, PTA 03/05/20 1312      Row Name 03/05/20 1033             Pain Scale: Numbers Pre/Post-Treatment    Pain Scale: Numbers, Pretreatment  4/10  -RM      Pain Scale: Numbers, Post-Treatment  6/10  -RM      Pain Location - Side  Left  -RM      Pain Location  knee  -RM      Recorded by [] Vladimir Cao, PTA 03/05/20 1312      Row Name                Wound 03/03/20 1530 Left anterior knee Incision    Wound - Properties Group Date first assessed: 03/03/20 [JM] Time first assessed: 1530 [JM] Present on Hospital Admission: Y [JM] Side: Left [JM] Orientation: anterior [JM] Location: knee [JM] Primary Wound Type: Incision [JM] Recorded by:  [JM] Katie Bernardo RN 03/04/20 1223    Row Name 03/05/20 1033             Plan of Care Review    Plan of Care Reviewed With  patient  -RM      Progress  improving  -RM      Outcome Summary  Pt tolerates stair training well using  axillary crutches to ascend and descend.  Pt reports has crutches at home. Pt was also able to ambulate 156' cga with rw.  Reviewed HEP program with pt.  See flowsheet for details.   -RM      Recorded by [] Vladimir Cao, PTA 03/05/20 1312      Row Name 03/05/20 1033             Outcome Summary/Treatment Plan (PT)    Daily Summary of Progress (PT)  progress toward functional goals is good  -RM      Plan for Continued Treatment (PT)  Cont PT per POC.   -RM      Recorded by [] Vladimir Cao, PTA 03/05/20 1312        User Key  (r) = Recorded By, (t) = Taken By, (c) = Cosigned By    Initials Name Effective Dates Discipline    Vladimir Le, PTA 03/07/18 -  PT    Katie Torres RN 03/18/19 -  Nurse          Wound 03/03/20 1530 Left  anterior knee Incision (Active)   Dressing Appearance dry;intact 3/5/2020  4:00 AM   Closure SONAL 3/5/2020  4:00 AM   Periwound Temperature warm 3/5/2020  4:00 AM   Periwound Skin Turgor other (see comments) 3/5/2020  4:00 AM   Drainage Amount none 3/5/2020  4:00 AM               PT Recommendation and Plan     Outcome Summary/Treatment Plan (PT)  Daily Summary of Progress (PT): progress toward functional goals is good  Plan for Continued Treatment (PT): Cont PT per POC.   Plan of Care Reviewed With: patient  Progress: improving  Outcome Summary: Pt tolerates stair training well using  axillary crutches to ascend and descend.  Pt reports has crutches at home. Pt was also able to ambulate 156' cga with rw.  Reviewed HEP program with pt.  See flowsheet for details.   Outcome Measures     Row Name 03/05/20 1033 03/04/20 0914 03/04/20 0912       How much help from another person do you currently need...    Turning from your back to your side while in flat bed without using bedrails?  4  -RM  4  -RM  --    Moving from lying on back to sitting on the side of a flat bed without bedrails?  4  -RM  4  -RM  --    Moving to and from a bed to a chair (including a wheelchair)?  4  -RM  3  -RM  --    Standing up from a chair using your arms (e.g., wheelchair, bedside chair)?  4  -RM  3  -RM  --    Climbing 3-5 steps with a railing?  3  -RM  3  -RM  --    To walk in hospital room?  3  -RM  3  -RM  --    AM-PAC 6 Clicks Score (PT)  22  -RM  20  -RM  --       How much help from another is currently needed...    Putting on and taking off regular lower body clothing?  --  --  3  -AH    Bathing (including washing, rinsing, and drying)  --  --  3  -AH    Toileting (which includes using toilet bed pan or urinal)  --  --  4  -AH    Putting on and taking off regular upper body clothing  --  --  4  -AH    Taking care of personal grooming (such as brushing teeth)  --  --  4  -AH    Eating meals  --  --  4  -AH    AM-PAC 6 Clicks Score (OT)  --   --  22  -       Functional Assessment    Outcome Measure Options  AM-PAC 6 Clicks Basic Mobility (PT)  -  AM-PAC 6 Clicks Basic Mobility (PT)  -  AM-PAC 6 Clicks Daily Activity (OT)  -      User Key  (r) = Recorded By, (t) = Taken By, (c) = Cosigned By    Initials Name Provider Type     Maya Winters Occupational Therapist    Vladimir Le, SUYAPA Physical Therapy Assistant         Time Calculation:   PT Charges     Row Name 03/05/20 1313             Time Calculation    Start Time  1033  -RM      Stop Time  1126  -RM      Time Calculation (min)  53 min  -RM      PT Received On  03/05/20  -RM      PT Goal Re-Cert Due Date  03/13/20  -RM         Time Calculation- PT    Total Timed Code Minutes- PT  53 minute(s)  -RM         Timed Charges    08612 - Gait Training Minutes   53  -RM        User Key  (r) = Recorded By, (t) = Taken By, (c) = Cosigned By    Initials Name Provider Type    Vladimir Le, SUYAPA Physical Therapy Assistant        Therapy Charges for Today     Code Description Service Date Service Provider Modifiers Qty    30871305937 HC PT THER PROC EA 15 MIN 3/4/2020 Vladimir Cao, PTA GP 1    14204979061 HC GAIT TRAINING EA 15 MIN 3/4/2020 Vladimir Cao, PTA GP 1    50179089716 HC PT THERAPEUTIC ACT EA 15 MIN 3/4/2020 Vladimir Cao, PTA GP 1    25438998773 HC GAIT TRAINING EA 15 MIN 3/5/2020 Vladimir Cao, PTA GP 4          PT G-Codes  Outcome Measure Options: AM-PAC 6 Clicks Basic Mobility (PT)  AM-PAC 6 Clicks Score (PT): 22  AM-PAC 6 Clicks Score (OT): 22    Vladimir Cao PTA  3/5/2020

## 2020-03-06 ENCOUNTER — TRANSITIONAL CARE MANAGEMENT TELEPHONE ENCOUNTER (OUTPATIENT)
Dept: CALL CENTER | Facility: HOSPITAL | Age: 55
End: 2020-03-06

## 2020-03-06 LAB
LAB AP CASE REPORT: NORMAL
PATH REPORT.FINAL DX SPEC: NORMAL

## 2020-03-06 NOTE — OUTREACH NOTE
Call Center TCM Note      Responses   [facility] patient discharged from?  [facility]   Does the patient have one of the following disease processes/diagnoses(primary or secondary)?  Total Joint Replacement   Joint surgery performed?  Knee   TCM attempt successful?  Yes   Call start time  0928   Call end time  0940   Discharge diagnosis  Total knee arthro, left   Does the patient have all medications related to this admission filled (includes all antibiotics, pain medications, etc.)  Yes   Is the patient taking all medications as directed (includes completed medication regime)?  Yes   Is the patient able to teach back alternate methods of pain control?  Knee-elevation/no pillow under knee, Ice, Reposition, Short, frequent activity, Correct alignment   Comments regarding appointments  Followup with surgeon on March 16th    Does the patient have a follow up appointment with their surgeon?  Yes   Has the patient kept scheduled appointments due by today?  N/A   Comments  Encouraged patient to make followup with PCP. Attempted to schedule, but no availablility in 1-2 week timeframe.    What is the Home health agency?   [facility] in [place]   Has home health visited the patient within 72 hours of discharge?  Yes   What DME was ordered?  Rolling walker and bedside commode per Alissa   Has all DME been delivered?  Yes   Psychosocial issues?  No   Has the patient began therapy sessions (either in the home or as an out patient)?  Yes   If the patient has started attending therapy, what post op day did they begin to attend (either in home or as an out patient)?    HH/PT is starting therapy today in home 3/6/2020 (post op Day 3)   Does the patient have a wound vac in place?  No   Has the patient fallen since discharge?  No   Comments  Patient is doing well. Pain is minimal with pain medications. No questions or concerns at this time.    Did the patient receive a copy of their discharge instructions?  Yes   Nursing  interventions  Reviewed instructions with patient   What is the patient's perception of their functional status since discharge?  Improving   Is the patient able to teach back signs and symptoms of infection?  Temp >100.4 for 24h or longer, Incisional drainage, Increased swelling or redness around incision (not associated with surgical edema), Severe discomfort or pain, Changes in mobility, Shortness of breath or chest pain, Blisters around incision   Is the patient able to teach back how to prevent infection?  Check incision daily, Wash hands before and after touching incision, Keep incision covered if drainage, No tub baths, hot tub or swimming, Keep incision covered if pets in house, Shower only as directed by surgeon, Eat well-balanced diet   Is the patient able to teach back signs and symptoms of DVT?  Redness in calf, Area hot to touch, Shortness of breath or chest pain, Severe pain in calf, Swelling in calf   Is the patient able to teach back home safety measures?  Ability to shower, Accessibility to necessary areas in home   TCM call completed?  Yes          Wallace Keller RN    3/6/2020, 9:42 AM

## 2020-03-06 NOTE — OUTREACH NOTE
Prep Survey      Responses   [facility] facility patient discharged from?  [facility]   Is LACE score < 7 ?  Yes   Eligibility  [facility]   Date of Admission  03/03/20   Date of Discharge  03/05/20   Discharge Disposition  Home-Health Care Svc   Discharge diagnosis  Total knee arthro, left   Does the patient have one of the following disease processes/diagnoses(primary or secondary)?  Total Joint Replacement   Does the patient have Home health ordered?  Yes   What is the Home health agency?   [facility] in [place]   Is there a DME ordered?  Yes   What DME was ordered?  Rolling walker and bedside commode per Alissa   Prep survey completed?  Yes          Kathy Perez RN

## 2020-03-06 NOTE — PROGRESS NOTES
Case Management Discharge Note           Provided Post Acute Provider List?: Yes  Post Acute Provider List: DME Supplier, Home Health  Delivered To: Patient  Method of Delivery: In person    Destination      No service has been selected for the patient.      Durable Medical Equipment      No service has been selected for the patient.      Dialysis/Infusion      No service has been selected for the patient.      Home Medical Care - Selection Complete      Service Provider Request Status Selected Services Address Phone Number Fax Number    [facility] Selected Home Health Services [address] [place], [place] [id number] [phone]      Therapy      No service has been selected for the patient.      Community Resources      No service has been selected for the patient.             Final Discharge Disposition Code: 06 - home with home health care

## 2020-03-06 NOTE — PROGRESS NOTES
RAFI Fajardo    Nerve Cath Post Op Call    Patient Name: Benjie Mcgovern  :  1965  MRN:  [id number]  Date of Discharge: 3/5/2020    Nerve Cath Post Op Call:    Analgesia:Poor  Pain Score:4/10  Side Effects:None  Catheter Site:clean  Patient Controlled ON Q pump infusion rate: 8ml/hr  Catheter Plan: Will continue with plan at home without changes    Pt reports limited pain control. Will remove tomorrow as instructed. Anesthesia department will continue to follow.

## 2020-03-08 NOTE — PROGRESS NOTES
RAFI Fajardo    Nerve Cath Post Op Call    Patient Name: Benjie Mcgovern  :  1965  MRN:  [id number]  Date of Discharge: 3/5/2020    Nerve Cath Post Op Call:    Analgesia:Fair  Pain Score:5/10  Catheter Site:clean  Catheter Plan: Patient/Family member was instructed to remove the catheter during telephone contact

## 2020-03-16 ENCOUNTER — OFFICE VISIT (OUTPATIENT)
Dept: ORTHOPEDIC SURGERY | Facility: CLINIC | Age: 55
End: 2020-03-16

## 2020-03-16 ENCOUNTER — HOSPITAL ENCOUNTER (OUTPATIENT)
Dept: ULTRASOUND IMAGING | Facility: HOSPITAL | Age: 55
Discharge: HOME | End: 2020-03-16
Admitting: PHYSICIAN ASSISTANT

## 2020-03-16 VITALS — BODY MASS INDEX: 33.05 KG/M2 | WEIGHT: 244 LBS | HEIGHT: 72 IN | RESPIRATION RATE: 18 BRPM

## 2020-03-16 DIAGNOSIS — I82.442 ACUTE DEEP VEIN THROMBOSIS (DVT) OF TIBIAL VEIN OF LEFT LOWER EXTREMITY (HCC): ICD-10-CM

## 2020-03-16 DIAGNOSIS — Z96.652 STATUS POST TOTAL KNEE REPLACEMENT USING CEMENT, LEFT: Primary | ICD-10-CM

## 2020-03-16 PROCEDURE — 93971 EXTREMITY STUDY: CPT

## 2020-03-16 PROCEDURE — 99024 POSTOP FOLLOW-UP VISIT: CPT | Performed by: PHYSICIAN ASSISTANT

## 2020-03-16 NOTE — PROGRESS NOTES
"Subjective   Patient ID: Benjie Mcgovern is a 54 y.o. right hand dominant male is here today for a post-operative visit.  Post-op and Pain of the Left Knee (03/03/2020 left total knee replacement. He states his pain level is 4/10. He states he is doing physical therapy at home and had therapy this morning.)          CHIEF COMPLAINT:  History of Present Illness      Pain controlled: [] no   [x] yes   Medication refill requested: [x] no   [] yes    Patient compliant with instructions: [] no   [x] yes   Other: Reports good progress since surgery.  Patient denies chest pain or shortness of breath.  He states his pain is well controlled.  Patient did have a posterior tibial DVT noted while in the hospital he was placed on Eliquis.     Past Medical History:   Diagnosis Date   • Arthritis     Left knee   • Atrial fibrillation (CMS/HCC) 2003    REPORTS WAS DIAGNOSED IN 2003 AND THAT HE HAS HAD NO EPISODES SINCE THAT TIME   • ED (erectile dysfunction) of non-organic origin    • Elevated cholesterol     REPORTS WAS TOLD \"BORDERLINE\" BUT THAT HE HAS NEVER TAKEN MEDICATION   • Foot pain    • History of exercise stress test 2003    REPORTS WAS TOLD THAT ALL WAS WNL'S   • History of fracture     HISTORY OF LEFT WRIST AS A CHILD - HAD SURGICAL INTERVENTION   • History of kidney stones     REPORTS MULTIPLE EPISODES AND THAT HE HAS REQUIRED SURGICAL INTERVENTION IN THE PAST   • Hyperlipidemia    • Hypertension    • Impaired functional mobility, balance, gait, and endurance    • Wears contact lenses     INSTRUCTED TO LEAVE CONTACTS OUT THE DOS        Past Surgical History:   Procedure Laterality Date   • COLONOSCOPY N/A 8/7/2017    Procedure: COLONOSCOPY WITH BXS, POLYPECTOMY;  Surgeon: Fermín Hamilton MD;  Location: [facility] ENDOSCOPY;  Service:    • FACIAL RECONSTRUCTION SURGERY      FROM DOG BITE AS A CHILD   • FRACTURE SURGERY Left     WRIST AS  A CHILD   • KIDNEY STONE SURGERY     • KNEE ARTHROSCOPY Left 9/27/2018    Procedure: Knee " "diagnostic arthroscopy, left with partial medial menisectomy, removal of loose bodies and two compartment chondroplasty;  Surgeon: Dean Garcia MD;  Location: [facility];  Service: Orthopedics   • KNEE SURGERY Right 2015   • TOTAL KNEE ARTHROPLASTY Left 3/3/2020    Procedure: total knee arthroplasty, Left;  Surgeon: Dean Garcia MD;  Location: [facility];  Service: Orthopedics;  Laterality: Left;       No Known Allergies    Review of Systems   Constitutional: Negative for fever.   HENT: Negative for dental problem and voice change.    Eyes: Negative for visual disturbance.   Respiratory: Negative for shortness of breath.    Cardiovascular: Negative for chest pain.   Gastrointestinal: Negative for abdominal pain.   Genitourinary: Negative for dysuria.   Musculoskeletal: Positive for arthralgias. Negative for gait problem and joint swelling.   Skin: Negative for rash.   Neurological: Negative for speech difficulty.   Hematological: Does not bruise/bleed easily.   Psychiatric/Behavioral: Negative for confusion.     I have reviewed the medical and surgical history, family history, social history, medications, and/or allergies, and the review of systems of this report.    Objective   Resp 18   Ht 182.9 cm (72\")   Wt 111 kg (244 lb)   BMI 33.09 kg/m²       Signs of infection: [x] no                    [] yes   Drainage: [x] no                    [] yes   Incision: [x] healing well     []healed well   Motor exam intact: [] no                    [x] yes   Neurovascular exam intact: [] no                    [x] yes   Signs of compartment syndrome: [x] no                    [] yes   Signs of DVT: [x] no                    [] yes   Other:      Physical Exam  Ortho Exam    Extremity DVT signs are negative on physical exam with negative Rebekah sign, no calf pain, no palpable cords and no skin tone change  Neurologic Exam    Assessment/Plan     Independent Review of Radiographic Studies:    No new imaging done " today.    Laboratory and Other Studies:  No new results reviewed today.   The repeat ultrasound venous Doppler performed today reveals no evidence of acute DVT  Medical Decision Making:    Stable neurovascular exam.       Procedures     Benjie was seen today for post-op and pain.    Diagnoses and all orders for this visit:    Status post total knee replacement using cement, left         Recommendations/Plan:     Sutures Staples or Pins [] Removed today  [] At prior visit  [] Plan removal later   Physical therapy: []rehab facility  []outpatient referral  [] therapy ongoing   Ultrasound: []not ordered         []order given to patient   Labs: []not ordered         []order given to patient   Weight Bearing status: []Full []WBAT []PWB []NWB []Other       Follow up in 6 weeks   Continue the Eliquis until completed.  Did not start full dose 325 aspirin daily x2 weeks    Patient is encouraged and agreeable to call or return sooner for any issues or concerns.

## 2020-04-07 ENCOUNTER — TELEPHONE (OUTPATIENT)
Dept: ORTHOPEDIC SURGERY | Facility: CLINIC | Age: 55
End: 2020-04-07

## 2020-04-07 DIAGNOSIS — T81.31XA POSTOPERATIVE DEHISCENCE OF SKIN WOUND, INITIAL ENCOUNTER: Primary | ICD-10-CM

## 2020-04-07 RX ORDER — CEPHALEXIN 500 MG/1
500 CAPSULE ORAL 3 TIMES DAILY
Qty: 30 CAPSULE | Refills: 0 | Status: SHIPPED | OUTPATIENT
Start: 2020-04-07 | End: 2020-06-24

## 2020-04-07 NOTE — TELEPHONE ENCOUNTER
I spoke with patient after receiving call from BERNY Mcduffie stating patient had a tiny spot on knee that was not healing, I offered patient option of coming in, however, he would like to try antibiotic and see how it heals, he will let us know of any changes

## 2020-05-01 DIAGNOSIS — M17.12 PRIMARY OSTEOARTHRITIS OF LEFT KNEE: ICD-10-CM

## 2020-05-01 DIAGNOSIS — Z96.652 STATUS POST TOTAL KNEE REPLACEMENT USING CEMENT, LEFT: Primary | ICD-10-CM

## 2020-05-04 ENCOUNTER — OFFICE VISIT (OUTPATIENT)
Dept: ORTHOPEDIC SURGERY | Facility: CLINIC | Age: 55
End: 2020-05-04

## 2020-05-04 VITALS — HEIGHT: 72 IN | BODY MASS INDEX: 33.05 KG/M2 | WEIGHT: 244 LBS | RESPIRATION RATE: 18 BRPM

## 2020-05-04 DIAGNOSIS — Z96.652 STATUS POST TOTAL KNEE REPLACEMENT USING CEMENT, LEFT: Primary | ICD-10-CM

## 2020-05-04 PROCEDURE — 99024 POSTOP FOLLOW-UP VISIT: CPT | Performed by: PHYSICIAN ASSISTANT

## 2020-05-04 NOTE — PROGRESS NOTES
"Subjective   Patient ID: Benjie Mcgovern is a 54 y.o.  male is here today for a post-operative visit.  Post-op of the Left Knee (Patient here today to follow up s/p total knee arthroplasty, Left. He states he is doing better, doing Pt with home health. )          CHIEF COMPLAINT:    History of Present Illness      Pain controlled: [] no   [x] yes   Medication refill requested: [x] no   [] yes    Patient compliant with instructions: [] no   [x] yes   Other: Reports good progress since surgery.  He has not been able to attend formal PT ( has been doing HEP with home health PT)  He did have a small scab that was red ( he phoned in with this concern and we called in keflex, which he states completley eradicated the redness.   He does have swelling at times but this subsides with rest. ( he notes the stiffness and soreness of the left knee is improving)       Past Medical History:   Diagnosis Date   • Arthritis     Left knee   • Atrial fibrillation (CMS/HCC) 2003    REPORTS WAS DIAGNOSED IN 2003 AND THAT HE HAS HAD NO EPISODES SINCE THAT TIME   • ED (erectile dysfunction) of non-organic origin    • Elevated cholesterol     REPORTS WAS TOLD \"BORDERLINE\" BUT THAT HE HAS NEVER TAKEN MEDICATION   • Foot pain    • History of exercise stress test 2003    REPORTS WAS TOLD THAT ALL WAS WNL'S   • History of fracture     HISTORY OF LEFT WRIST AS A CHILD - HAD SURGICAL INTERVENTION   • History of kidney stones     REPORTS MULTIPLE EPISODES AND THAT HE HAS REQUIRED SURGICAL INTERVENTION IN THE PAST   • Hyperlipidemia    • Hypertension    • Impaired functional mobility, balance, gait, and endurance    • Wears contact lenses     INSTRUCTED TO LEAVE CONTACTS OUT THE DOS        Past Surgical History:   Procedure Laterality Date   • COLONOSCOPY N/A 8/7/2017    Procedure: COLONOSCOPY WITH BXS, POLYPECTOMY;  Surgeon: Fermín Hamilton MD;  Location: [facility] ENDOSCOPY;  Service:    • FACIAL RECONSTRUCTION SURGERY      FROM DOG BITE AS A CHILD " "  • FRACTURE SURGERY Left     WRIST AS  A CHILD   • KIDNEY STONE SURGERY     • KNEE ARTHROSCOPY Left 9/27/2018    Procedure: Knee diagnostic arthroscopy, left with partial medial menisectomy, removal of loose bodies and two compartment chondroplasty;  Surgeon: Dean Garcia MD;  Location: [facility];  Service: Orthopedics   • KNEE SURGERY Right 2015   • TOTAL KNEE ARTHROPLASTY Left 3/3/2020    Procedure: total knee arthroplasty, Left;  Surgeon: Dean Garcia MD;  Location: [facility];  Service: Orthopedics;  Laterality: Left;       No Known Allergies    Review of Systems   Constitutional: Negative for fever.   HENT: Negative for dental problem and voice change.    Eyes: Negative for visual disturbance.   Respiratory: Negative for shortness of breath.    Cardiovascular: Negative for chest pain.   Gastrointestinal: Negative for abdominal pain.   Genitourinary: Negative for dysuria.   Musculoskeletal: Positive for arthralgias. Negative for gait problem and joint swelling.   Skin: Negative for rash.   Neurological: Negative for speech difficulty.   Hematological: Does not bruise/bleed easily.   Psychiatric/Behavioral: Negative for confusion.     I have reviewed the medical and surgical history, family history, social history, medications, and/or allergies, and the review of systems of this report.    Objective   Resp 18   Ht 182.9 cm (72\")   Wt 111 kg (244 lb)   BMI 33.09 kg/m²       Signs of infection: [x] no                    [] yes   Drainage: [x] no                    [] yes   Incision: [x] healing well     []healed well   Motor exam intact: [] no                    [x] yes   Neurovascular exam intact: [] no                    [x] yes   Signs of compartment syndrome: [x] no                    [] yes   Signs of DVT: [x] no                    [] yes   Other:      Physical Exam   Constitutional: He is oriented to person, place, and time. He appears well-developed and well-nourished.   Pulmonary/Chest: " Effort normal.   Neurological: He is alert and oriented to person, place, and time.   Psychiatric: He has a normal mood and affect.   Nursing note and vitals reviewed.    Right Knee Exam     Range of Motion   Right knee extension: 8.   Right knee flexion: 125.       Left Knee Exam     Range of Motion   Left knee extension: 10.   Left knee flexion: 102.     Other   Erythema: absent  Scars: present  Sensation: normal  Pulse: present            Extremity DVT signs are negative on physical exam with negative Rebekah sign, no calf pain, no palpable cords and no skin tone change  Neurologic Exam     Mental Status   Oriented to person, place, and time.   there is some left quad muscle soreness as well has hamstring tightness.       Assessment/Plan     Independent Review of Radiographic Studies:    No new imaging done today.    Laboratory and Other Studies:  No new results reviewed today.     Medical Decision Making:    Stable neurovascular exam.       Procedures     Benjie was seen today for post-op.    Diagnoses and all orders for this visit:    Status post total knee replacement using cement, left         Recommendations/Plan:     Sutures Staples or Pins [] Removed today  [x] At prior visit  [] Plan removal later   Physical therapy: []rehab facility  []outpatient referral  [x] therapy ongoing   Ultrasound: [x]not ordered         []order given to patient   Labs: [x]not ordered         []order given to patient   Weight Bearing status: []Full [x]WBAT []PWB []NWB []Other     Guided on proper techniques for mobility, strength, agility and/or conditioning exercises  Ice, heat, and/or modalities as beneficial  Watch for signs and symptoms of infection  Weight bearing parameters reviewed  Physical therapy program ongoing  Take prescribed medications as instructed only as tolerated     Instructed patient to use warm heat prior to HEP.   I have low suspicion for arthrofibrosis as this time.   Follow up in 8 weeks        Patient is  encouraged and agreeable to call or return sooner for any issues or concerns.

## 2020-06-24 ENCOUNTER — OFFICE VISIT (OUTPATIENT)
Dept: INTERNAL MEDICINE | Facility: CLINIC | Age: 55
End: 2020-06-24

## 2020-06-24 VITALS
TEMPERATURE: 98.4 F | SYSTOLIC BLOOD PRESSURE: 152 MMHG | HEART RATE: 69 BPM | DIASTOLIC BLOOD PRESSURE: 92 MMHG | HEIGHT: 72 IN | RESPIRATION RATE: 16 BRPM | OXYGEN SATURATION: 98 % | BODY MASS INDEX: 32.1 KG/M2 | WEIGHT: 237 LBS

## 2020-06-24 DIAGNOSIS — I48.20 CHRONIC ATRIAL FIBRILLATION (HCC): ICD-10-CM

## 2020-06-24 DIAGNOSIS — E78.2 MIXED HYPERLIPIDEMIA: ICD-10-CM

## 2020-06-24 DIAGNOSIS — I10 BENIGN ESSENTIAL HYPERTENSION: Primary | ICD-10-CM

## 2020-06-24 PROCEDURE — 99214 OFFICE O/P EST MOD 30 MIN: CPT | Performed by: INTERNAL MEDICINE

## 2020-06-24 RX ORDER — ASPIRIN 81 MG/1
81 TABLET ORAL DAILY
COMMUNITY
End: 2022-11-23 | Stop reason: HOSPADM

## 2020-06-24 RX ORDER — LOSARTAN POTASSIUM 50 MG/1
50 TABLET ORAL DAILY
Qty: 90 TABLET | Refills: 2 | Status: SHIPPED | OUTPATIENT
Start: 2020-06-24 | End: 2021-01-07 | Stop reason: SDUPTHER

## 2020-06-24 NOTE — PROGRESS NOTES
Subjective     Benjie Mcgovern is a 54 y.o. male.     Chief Complaint   Patient presents with   • Hypertension   • Hyperlipidemia   • Atrial Fibrillation       History of Present Illness   HPI: Patient is here to follow up on the blood pressure  The patient is taking the blood pressure medications as prescribed and has had no side effects. The patient is also here to follow up on the cholesterol and is trying to follow a diet. The patient is   due to get lab work done .  The patient also here to follow-up on atrial fibrillation and needs refills on medications .  He recently underwent replacement of left knee and follows up with orthopedist  Hyperlipidemia   Pertinent negatives include no chest pain or shortness of breath.   Hypertension   Pertinent negatives include no chest pain, palpitations or shortness of breath.    The following portions of the patient's history were reviewed and updated as appropriate: allergies, current medications, past family history, past medical history, past social history, past surgical history and problem list.    Review of Systems   Constitutional: Negative for appetite change, fatigue and fever.   HENT: Negative for congestion, ear discharge, ear pain, sinus pressure and sore throat.    Eyes: Negative for pain and discharge.   Respiratory: Negative for cough, shortness of breath and wheezing.    Cardiovascular: Negative for chest pain, palpitations and leg swelling.   Gastrointestinal: Negative for abdominal pain, constipation, diarrhea, nausea and vomiting.   Endocrine: Negative for cold intolerance and heat intolerance.   Genitourinary: Negative for dysuria and flank pain.   Musculoskeletal: Negative for arthralgias and joint swelling.   Skin: Negative for pallor and rash.   Allergic/Immunologic: Negative for environmental allergies and food allergies.   Neurological: Negative for dizziness, weakness and numbness.   Hematological: Negative for adenopathy. Does not bruise/bleed  "easily.   Psychiatric/Behavioral: Negative for behavioral problems and dysphoric mood. The patient is not nervous/anxious.          Current Outpatient Medications:   •  aspirin 81 MG EC tablet, Take 81 mg by mouth Daily., Disp: , Rfl:   •  dilTIAZem CD (CARDIZEM CD) 120 MG 24 hr capsule, Take 1 capsule by mouth Every Night., Disp: 90 capsule, Rfl: 2  •  losartan (COZAAR) 50 MG tablet, Take 1 tablet by mouth Daily., Disp: 90 tablet, Rfl: 2    Objective     Blood pressure 152/92, pulse 69, temperature 98.4 °F (36.9 °C), resp. rate 16, height 182.9 cm (72.01\"), weight 108 kg (237 lb), SpO2 98 %.    Physical Exam   Constitutional: He is oriented to person, place, and time. He appears well-developed and well-nourished. No distress.   HENT:   Head: Normocephalic and atraumatic.   Right Ear: External ear normal.   Left Ear: External ear normal.   Nose: Nose normal.   Mouth/Throat: Oropharynx is clear and moist.   Eyes: Pupils are equal, round, and reactive to light. Conjunctivae and EOM are normal.   Neck: Normal range of motion. Neck supple. No thyromegaly present.   Cardiovascular: Normal rate, regular rhythm and normal heart sounds.   Pulmonary/Chest: Effort normal. No respiratory distress.   Abdominal: Soft. He exhibits no distension. There is no tenderness. There is no guarding.   Musculoskeletal: He exhibits no edema.   Left knee scar   Lymphadenopathy:     He has no cervical adenopathy.   Neurological: He is alert and oriented to person, place, and time.   No gross motor or sensory deficits   Skin: Skin is warm and dry. He is not diaphoretic. No erythema.   Psychiatric: He has a normal mood and affect.   Nursing note and vitals reviewed.    Patient's Body mass index is 32.14 kg/m². BMI is above normal parameters. Recommendations include: educational material, exercise counseling and nutrition counseling.      Results for orders placed or performed during the hospital encounter of 03/03/20   Basic Metabolic Panel "   Result Value Ref Range    Glucose 136 (H) 65 - 99 mg/dL    BUN 16 6 - 20 mg/dL    Creatinine 0.70 (L) 0.76 - 1.27 mg/dL    Sodium 136 136 - 145 mmol/L    Potassium 4.2 3.5 - 5.2 mmol/L    Chloride 103 98 - 107 mmol/L    CO2 21.0 (L) 22.0 - 29.0 mmol/L    Calcium 8.7 8.6 - 10.5 mg/dL    eGFR Non African Amer 118 >60 mL/min/1.73    BUN/Creatinine Ratio 22.9 7.0 - 25.0    Anion Gap 12.0 5.0 - 15.0 mmol/L   CBC Auto Differential   Result Value Ref Range    WBC 18.61 (H) 3.40 - 10.80 10*3/mm3    RBC 4.82 4.14 - 5.80 10*6/mm3    Hemoglobin 14.7 13.0 - 17.7 g/dL    Hematocrit 43.5 37.5 - 51.0 %    MCV 90.2 79.0 - 97.0 fL    MCH 30.5 26.6 - 33.0 pg    MCHC 33.8 31.5 - 35.7 g/dL    RDW 12.2 (L) 12.3 - 15.4 %    RDW-SD 40.2 37.0 - 54.0 fl    MPV 11.0 6.0 - 12.0 fL    Platelets 261 140 - 450 10*3/mm3    Neutrophil % 83.0 (H) 42.7 - 76.0 %    Lymphocyte % 8.5 (L) 19.6 - 45.3 %    Monocyte % 8.0 5.0 - 12.0 %    Eosinophil % 0.0 (L) 0.3 - 6.2 %    Basophil % 0.1 0.0 - 1.5 %    Immature Grans % 0.4 0.0 - 0.5 %    Neutrophils, Absolute 15.44 (H) 1.70 - 7.00 10*3/mm3    Lymphocytes, Absolute 1.59 0.70 - 3.10 10*3/mm3    Monocytes, Absolute 1.49 (H) 0.10 - 0.90 10*3/mm3    Eosinophils, Absolute 0.00 0.00 - 0.40 10*3/mm3    Basophils, Absolute 0.02 0.00 - 0.20 10*3/mm3    Immature Grans, Absolute 0.07 (H) 0.00 - 0.05 10*3/mm3    nRBC 0.0 0.0 - 0.2 /100 WBC   Troponin   Result Value Ref Range    Troponin T <0.010 0.000 - 0.030 ng/mL   CBC Auto Differential   Result Value Ref Range    WBC 13.91 (H) 3.40 - 10.80 10*3/mm3    RBC 4.34 4.14 - 5.80 10*6/mm3    Hemoglobin 13.3 13.0 - 17.7 g/dL    Hematocrit 39.6 37.5 - 51.0 %    MCV 91.2 79.0 - 97.0 fL    MCH 30.6 26.6 - 33.0 pg    MCHC 33.6 31.5 - 35.7 g/dL    RDW 12.7 12.3 - 15.4 %    RDW-SD 42.2 37.0 - 54.0 fl    MPV 11.3 6.0 - 12.0 fL    Platelets 229 140 - 450 10*3/mm3    Neutrophil % 68.8 42.7 - 76.0 %    Lymphocyte % 15.4 (L) 19.6 - 45.3 %    Monocyte % 15.0 (H) 5.0 - 12.0 %     Eosinophil % 0.1 (L) 0.3 - 6.2 %    Basophil % 0.3 0.0 - 1.5 %    Immature Grans % 0.4 0.0 - 0.5 %    Neutrophils, Absolute 9.58 (H) 1.70 - 7.00 10*3/mm3    Lymphocytes, Absolute 2.14 0.70 - 3.10 10*3/mm3    Monocytes, Absolute 2.08 (H) 0.10 - 0.90 10*3/mm3    Eosinophils, Absolute 0.02 0.00 - 0.40 10*3/mm3    Basophils, Absolute 0.04 0.00 - 0.20 10*3/mm3    Immature Grans, Absolute 0.05 0.00 - 0.05 10*3/mm3    nRBC 0.0 0.0 - 0.2 /100 WBC   Magnesium   Result Value Ref Range    Magnesium 2.2 1.6 - 2.6 mg/dL   Basic Metabolic Panel   Result Value Ref Range    Glucose 118 (H) 65 - 99 mg/dL    BUN 22 (H) 6 - 20 mg/dL    Creatinine 0.88 0.76 - 1.27 mg/dL    Sodium 137 136 - 145 mmol/L    Potassium 4.2 3.5 - 5.2 mmol/L    Chloride 101 98 - 107 mmol/L    CO2 24.9 22.0 - 29.0 mmol/L    Calcium 8.7 8.6 - 10.5 mg/dL    eGFR Non African Amer 91 >60 mL/min/1.73    BUN/Creatinine Ratio 25.0 7.0 - 25.0    Anion Gap 11.1 5.0 - 15.0 mmol/L   Troponin   Result Value Ref Range    Troponin T <0.010 0.000 - 0.030 ng/mL   Adult Transthoracic Echo Complete W/ Cont if Necessary Per Protocol   Result Value Ref Range    Target HR (85%) 142 bpm    Max. Pred. HR (100%) 167 bpm   Type & Screen   Result Value Ref Range    ABO Type O     RH type Positive     Antibody Screen Negative     T&S Expiration Date 3/6/2020 11:59:59 PM    Tissue Pathology Exam   Result Value Ref Range    Case Report       Surgical Pathology Report                         Case: [id number]                                  Authorizing Provider:  Dean Garcia MD     Collected:           03/03/2020 11:48 AM          Ordering Location:     [facility] OR Received:            03/03/2020 02:21 PM          Pathologist:           Nash Hamm MD                                                          Specimen:    Knee, Left, LEFT KNEE BONE FRAGMENTS                                                       Final Diagnosis       See Scanned Report              Assessment/Plan   Benjie was seen today for hypertension, hyperlipidemia and atrial fibrillation.    Diagnoses and all orders for this visit:    Benign essential hypertension    Mixed hyperlipidemia  -     CBC & Differential  -     Comprehensive Metabolic Panel  -     Lipid Panel    Chronic atrial fibrillation (CMS/HCC)    Other orders  -     losartan (COZAAR) 50 MG tablet; Take 1 tablet by mouth Daily.        Plan:  1.  Benign essential hypertension: Will continue current medication, low-sodium diet advised, Counseled to regularly check BP at home with goal averaging <130/80.   2.mixed hyperlipidemia: will obtain   fasting CMP and lipid panel.  Diet and exercise counseled,    3. Atrial fibrillation: rate controlled,  will continue current medications , per cardiology , labs reviewed           Evie Martin MD

## 2020-06-29 ENCOUNTER — OFFICE VISIT (OUTPATIENT)
Dept: ORTHOPEDIC SURGERY | Facility: CLINIC | Age: 55
End: 2020-06-29

## 2020-06-29 VITALS — HEIGHT: 72 IN | RESPIRATION RATE: 18 BRPM | WEIGHT: 237 LBS | BODY MASS INDEX: 32.1 KG/M2

## 2020-06-29 DIAGNOSIS — Z96.652 STATUS POST TOTAL KNEE REPLACEMENT USING CEMENT, LEFT: Primary | ICD-10-CM

## 2020-06-29 PROCEDURE — 99212 OFFICE O/P EST SF 10 MIN: CPT | Performed by: PHYSICIAN ASSISTANT

## 2020-06-29 NOTE — PROGRESS NOTES
"Subjective   Patient ID: Benjie Mcgovern is a 54 y.o. male  Follow-up of the Left Knee (TKA 3/3/20, denies pain, reports he is progressing well)         History of Present Illness  Patient presents for scheduled appointment regarding left total knee arthroplasty.  Date of surgery 3/3/2020.  Patient states overall he is doing much better.  He does have occasional stiffness but denies pain, redness or warmth.  He does occasional home exercises but completed home health PT previously.   He notes he is having more right knee pain than left knee pain currently.  He anticipates returning to work in the near future.       Past Medical History:   Diagnosis Date   • Arthritis     Left knee   • Atrial fibrillation (CMS/HCC) 2003    REPORTS WAS DIAGNOSED IN 2003 AND THAT HE HAS HAD NO EPISODES SINCE THAT TIME   • ED (erectile dysfunction) of non-organic origin    • Elevated cholesterol     REPORTS WAS TOLD \"BORDERLINE\" BUT THAT HE HAS NEVER TAKEN MEDICATION   • Foot pain    • History of exercise stress test 2003    REPORTS WAS TOLD THAT ALL WAS WNL'S   • History of fracture     HISTORY OF LEFT WRIST AS A CHILD - HAD SURGICAL INTERVENTION   • History of kidney stones     REPORTS MULTIPLE EPISODES AND THAT HE HAS REQUIRED SURGICAL INTERVENTION IN THE PAST   • Hyperlipidemia    • Hypertension    • Impaired functional mobility, balance, gait, and endurance    • Wears contact lenses     INSTRUCTED TO LEAVE CONTACTS OUT THE DOS        Past Surgical History:   Procedure Laterality Date   • COLONOSCOPY N/A 8/7/2017    Procedure: COLONOSCOPY WITH BXS, POLYPECTOMY;  Surgeon: Fermín Hamilton MD;  Location: [facility] ENDOSCOPY;  Service:    • FACIAL RECONSTRUCTION SURGERY      FROM DOG BITE AS A CHILD   • FRACTURE SURGERY Left     WRIST AS  A CHILD   • KIDNEY STONE SURGERY     • KNEE ARTHROSCOPY Left 9/27/2018    Procedure: Knee diagnostic arthroscopy, left with partial medial menisectomy, removal of loose bodies and two compartment " chondroplasty;  Surgeon: Dean Garcia MD;  Location: [facility];  Service: Orthopedics   • KNEE SURGERY Right 2015   • TOTAL KNEE ARTHROPLASTY Left 3/3/2020    Procedure: total knee arthroplasty, Left;  Surgeon: Dean Garcia MD;  Location: [facility];  Service: Orthopedics;  Laterality: Left;       Family History   Problem Relation Age of Onset   • Other Other         brain tumor, renal disease   • Cancer Other    • Stroke Other    • Colon cancer Neg Hx        Social History     Socioeconomic History   • Marital status:      Spouse name: Not on file   • Number of children: Not on file   • Years of education: Not on file   • Highest education level: Not on file   Occupational History   • Occupation:      Employer: [employer]     Comment: since 1996   Tobacco Use   • Smoking status: Never Smoker   • Smokeless tobacco: Never Used   Substance and Sexual Activity   • Alcohol use: No   • Drug use: No   • Sexual activity: Defer         Current Outpatient Medications:   •  aspirin 81 MG EC tablet, Take 81 mg by mouth Daily., Disp: , Rfl:   •  dilTIAZem CD (CARDIZEM CD) 120 MG 24 hr capsule, Take 1 capsule by mouth Every Night., Disp: 90 capsule, Rfl: 2  •  losartan (COZAAR) 50 MG tablet, Take 1 tablet by mouth Daily., Disp: 90 tablet, Rfl: 2    No Known Allergies    Review of Systems   Constitutional: Negative for diaphoresis, fever and unexpected weight change.   HENT: Negative for dental problem and sore throat.    Eyes: Negative for visual disturbance.   Respiratory: Negative for shortness of breath.    Cardiovascular: Negative for chest pain.   Gastrointestinal: Negative for abdominal pain, constipation, diarrhea, nausea and vomiting.   Genitourinary: Negative for difficulty urinating and frequency.   Musculoskeletal: Positive for arthralgias (right knee).   Neurological: Negative for headaches.   Hematological: Does not bruise/bleed easily.       I have reviewed  "the medical and surgical history, family history, social history, medications, and/or allergies, and the review of systems of this report.    Objective   Resp 18   Ht 182.9 cm (72\")   Wt 108 kg (237 lb)   BMI 32.14 kg/m²    Physical Exam   Constitutional: He is oriented to person, place, and time. He appears well-developed and well-nourished.   Neck: No tracheal deviation present.   Pulmonary/Chest: Effort normal.   Musculoskeletal:        Left knee: He exhibits no ecchymosis, no deformity and no erythema. No tenderness found.   Neurological: He is alert and oriented to person, place, and time.   Psychiatric: He has a normal mood and affect. His behavior is normal.   Nursing note and vitals reviewed.    Left Knee Exam     Range of Motion   Left knee extension: 4.   Left knee flexion: 118.     Other   Erythema: absent  Scars: present  Sensation: normal            Extremity DVT signs are negative on physical exam with negative Rebekah sign, no calf pain, no palpable cords and no skin tone change   Neurologic Exam     Mental Status   Oriented to person, place, and time.              Assessment/Plan   Independent Review of Radiographic Studies:    No new imaging done today.      Procedures       Benjie was seen today for follow-up.    Diagnoses and all orders for this visit:    Status post total knee replacement using cement, left       Orthopedic activities reviewed and patient expressed appreciation  Discussion of orthopedic goals  Risk, benefits, and merits of treatment alternatives reviewed with the patient and questions answered  Ice, heat, and/or modalities as beneficial    Recommendations/Plan:  Patient is encouraged to call or return for any issues or concerns.    Follow up in 3 months  A work status was updated and provided to the patient  Patient agreeable to call or return sooner for any concerns.           EMR Dragon-transcription disclaimer:  This encounter note is an electronic transcription of spoken " language to printed text.  Electronic transcription of spoken language may permit erroneous or at times nonsensical words or phrases to be inadvertently transcribed.  Although I have reviewed the note for such errors, some may still exist

## 2020-11-17 RX ORDER — DILTIAZEM HYDROCHLORIDE 120 MG/1
120 CAPSULE, COATED, EXTENDED RELEASE ORAL NIGHTLY
Qty: 90 CAPSULE | Refills: 0 | Status: SHIPPED | OUTPATIENT
Start: 2020-11-17 | End: 2021-01-07 | Stop reason: SDUPTHER

## 2021-01-07 ENCOUNTER — OFFICE VISIT (OUTPATIENT)
Dept: INTERNAL MEDICINE | Facility: CLINIC | Age: 56
End: 2021-01-07

## 2021-01-07 VITALS
RESPIRATION RATE: 16 BRPM | DIASTOLIC BLOOD PRESSURE: 80 MMHG | HEIGHT: 72 IN | BODY MASS INDEX: 33.18 KG/M2 | OXYGEN SATURATION: 99 % | HEART RATE: 81 BPM | SYSTOLIC BLOOD PRESSURE: 148 MMHG | WEIGHT: 245 LBS | TEMPERATURE: 97.1 F

## 2021-01-07 DIAGNOSIS — E78.2 MIXED HYPERLIPIDEMIA: ICD-10-CM

## 2021-01-07 DIAGNOSIS — I10 BENIGN ESSENTIAL HYPERTENSION: Primary | ICD-10-CM

## 2021-01-07 DIAGNOSIS — M25.50 ARTHRALGIA OF MULTIPLE JOINTS: ICD-10-CM

## 2021-01-07 PROCEDURE — 99214 OFFICE O/P EST MOD 30 MIN: CPT | Performed by: INTERNAL MEDICINE

## 2021-01-07 RX ORDER — LOSARTAN POTASSIUM 50 MG/1
50 TABLET ORAL DAILY
Qty: 90 TABLET | Refills: 2 | Status: SHIPPED | OUTPATIENT
Start: 2021-01-07 | End: 2021-05-10 | Stop reason: SDUPTHER

## 2021-01-07 RX ORDER — DILTIAZEM HYDROCHLORIDE 120 MG/1
120 CAPSULE, COATED, EXTENDED RELEASE ORAL NIGHTLY
Qty: 90 CAPSULE | Refills: 2 | Status: SHIPPED | OUTPATIENT
Start: 2021-01-07 | End: 2021-05-10 | Stop reason: SDUPTHER

## 2021-01-07 NOTE — PATIENT INSTRUCTIONS
MyPlate from USDA    MyPlate is an outline of a general healthy diet based on the 2010 Dietary Guidelines for Americans, from the U.S. Department of Agriculture (USDA). It sets guidelines for how much food you should eat from each food group based on your age, sex, and level of physical activity.  What are tips for following MyPlate?  To follow MyPlate recommendations:  · Eat a wide variety of fruits and vegetables, grains, and protein foods.  · Serve smaller portions and eat less food throughout the day.  · Limit portion sizes to avoid overeating.  · Enjoy your food.  · Get at least 150 minutes of exercise every week. This is about 30 minutes each day, 5 or more days per week.  It can be difficult to have every meal look like MyPlate. Think about MyPlate as eating guidelines for an entire day, rather than each individual meal.  Fruits and vegetables  · Make half of your plate fruits and vegetables.  · Eat many different colors of fruits and vegetables each day.  · For a 2,000 calorie daily food plan, eat:  ? 2½ cups of vegetables every day.  ? 2 cups of fruit every day.  · 1 cup is equal to:  ? 1 cup raw or cooked vegetables.  ? 1 cup raw fruit.  ? 1 medium-sized orange, apple, or banana.  ? 1 cup 100% fruit or vegetable juice.  ? 2 cups raw leafy greens, such as lettuce, spinach, or kale.  ? ½ cup dried fruit.  Grains  · One fourth of your plate should be grains.  · Make at least half of the grains you eat each day whole grains.  · For a 2,000 calorie daily food plan, eat 6 oz of grains every day.  · 1 oz is equal to:  ? 1 slice bread.  ? 1 cup cereal.  ? ½ cup cooked rice, cereal, or pasta.  Protein  · One fourth of your plate should be protein.  · Eat a wide variety of protein foods, including meat, poultry, fish, eggs, beans, nuts, and tofu.  · For a 2,000 calorie daily food plan, eat 5½ oz of protein every day.  · 1 oz is equal to:  ? 1 oz meat, poultry, or fish.  ? ¼ cup cooked beans.  ? 1 egg.  ? ½ oz nuts  or seeds.  ? 1 Tbsp peanut butter.  Dairy  · Drink fat-free or low-fat (1%) milk.  · Eat or drink dairy as a side to meals.  · For a 2,000 calorie daily food plan, eat or drink 3 cups of dairy every day.  · 1 cup is equal to:  ? 1 cup milk, yogurt, cottage cheese, or soy milk (soy beverage).  ? 2 oz processed cheese.  ? 1½ oz natural cheese.  Fats, oils, salt, and sugars  · Only small amounts of oils are recommended.  · Avoid foods that are high in calories and low in nutritional value (empty calories), like foods high in fat or added sugars.  · Choose foods that are low in salt (sodium). Choose foods that have less than 140 milligrams (mg) of sodium per serving.  · Drink water instead of sugary drinks. Drink enough water each day to keep your urine pale yellow.  Where to find support  · Work with your health care provider or a nutrition specialist (dietitian) to develop a customized eating plan that is right for you.  · Download an krysta (mobile application) to help you track your daily food intake.  Where to find more information  · Go to ChooseMyPlate.gov for more information.  Summary  · MyPlate is a general guideline for healthy eating from the USDA. It is based on the 2010 Dietary Guidelines for Americans.  · In general, fruits and vegetables should take up ½ of your plate, grains should take up ¼ of your plate, and protein should take up ¼ of your plate.  This information is not intended to replace advice given to you by your health care provider. Make sure you discuss any questions you have with your health care provider.  Document Revised: 05/21/2020 Document Reviewed: 03/19/2018  Elsevier Patient Education © 2020 Elsevier Inc.

## 2021-01-07 NOTE — PROGRESS NOTES
"Chief Complaint  Hypertension and Hyperlipidemia    Subjective          Benjie Mcgovern presents to [facility] for   History of Present Illness  HPI: Patient is here to follow up on the blood pressure  The patient is taking the blood pressure medications as prescribed and has had no side effects. The patient is also here to follow up on the cholesterol and is trying to follow a diet. The patient   is  due to get lab work done .  The patient also needs refills on medications .  He also complains of joint pain for which he is taking ibuprofen 3 times a day  Hyperlipidemia   Pertinent negatives include no chest pain or shortness of breath.   Hypertension   Pertinent negatives include no chest pain, palpitations or shortness of breath.    Objective   Vital Signs:   /80   Pulse 81   Temp 97.1 °F (36.2 °C)   Resp 16   Ht 182.9 cm (72.01\")   Wt 111 kg (245 lb)   SpO2 99%   BMI 33.22 kg/m²     Physical Exam  Vitals signs and nursing note reviewed.   Constitutional:       General: He is not in acute distress.     Appearance: Normal appearance. He is not diaphoretic.   HENT:      Head: Normocephalic and atraumatic.      Right Ear: External ear normal.      Left Ear: External ear normal.      Nose: Nose normal.   Eyes:      Extraocular Movements: Extraocular movements intact.      Conjunctiva/sclera: Conjunctivae normal.   Neck:      Musculoskeletal: Neck supple.      Trachea: Trachea normal.   Cardiovascular:      Rate and Rhythm: Normal rate and regular rhythm.      Heart sounds: Normal heart sounds.   Pulmonary:      Effort: Pulmonary effort is normal. No respiratory distress.   Abdominal:      General: Abdomen is flat.   Musculoskeletal:         General: Deformity present.      Comments: Moves all limbs   Skin:     General: Skin is warm and dry.      Findings: No erythema.   Neurological:      Mental Status: He is alert and oriented to person, place, and time.      Comments: No " gross motor or sensory deficits        Result Review :     Common labs    Common Labsle 2/19/20 2/19/20 2/19/20 3/4/20 3/4/20 3/5/20 3/5/20    1120 1120 1120 0546 0546 0541 0541   BUN   19  16  22 (A)   Creatinine   0.79  0.70 (A)  0.88   eGFR Non African Am   103  118  91   Sodium   139  136  137   Potassium   4.3  4.2  4.2   Chloride   104  103  101   Calcium   9.6  8.7  8.7   Albumin   4.60       Total Bilirubin   0.3       Alkaline Phosphatase   131 (A)       AST (SGOT)   20       ALT (SGPT)   22       WBC 10.18   18.61 (A)  13.91 (A)    Hemoglobin 16.6   14.7  13.3    Hematocrit 49.5   43.5  39.6    Platelets 255   261  229    Hemoglobin A1C  5.30        (A) Abnormal value                      Assessment and Plan    Problem List Items Addressed This Visit        Cardiac and Vasculature    Benign essential hypertension - Primary    Relevant Medications    dilTIAZem CD (CARDIZEM CD) 120 MG 24 hr capsule    losartan (COZAAR) 50 MG tablet      Other Visit Diagnoses     Mixed hyperlipidemia        Relevant Orders    CBC & Differential    Comprehensive Metabolic Panel    Lipid Panel    Arthralgia of multiple joints        Relevant Medications    Diclofenac Sodium (VOLTAREN) 1 % gel gel      Plan:  1.  Benign essential hypertension: Will continue current medication, low-sodium diet advised, Counseled to regularly check BP at home with goal averaging <130/80.   2.mixed hyperlipidemia: will obtain   fasting CMP and lipid panel.  Diet and exercise counseled,    3.  Multiple joint pain, mostly left knee: Patient advised to take Tylenol arthritis and stop NSAIDs, will give a trial of diclofenac gel  I spent 30 minutes caring for Benjie on this date of service. This time includes time spent by me in the following activities:preparing for the visit, reviewing tests, performing a medically appropriate examination and/or evaluation , counseling and educating the patient/family/caregiver, ordering medications, tests, or  procedures and documenting information in the medical record  Follow Up {Instructions Charge Capture  Follow-up Communications :23}  Return in about 4 months (around 5/17/2021).  Patient was given instructions and counseling regarding his condition or for health maintenance advice. Please see specific information pulled into the AVS if appropriate.

## 2021-01-08 LAB
ALBUMIN SERPL-MCNC: 4.4 G/DL (ref 3.5–5.2)
ALBUMIN/GLOB SERPL: 1.6 G/DL
ALP SERPL-CCNC: 136 U/L (ref 39–117)
ALT SERPL-CCNC: 24 U/L (ref 1–41)
AST SERPL-CCNC: 21 U/L (ref 1–40)
BASOPHILS # BLD AUTO: 0.08 10*3/MM3 (ref 0–0.2)
BASOPHILS NFR BLD AUTO: 0.9 % (ref 0–1.5)
BILIRUB SERPL-MCNC: 0.5 MG/DL (ref 0–1.2)
BUN SERPL-MCNC: 20 MG/DL (ref 6–20)
BUN/CREAT SERPL: 23.8 (ref 7–25)
CALCIUM SERPL-MCNC: 9.6 MG/DL (ref 8.6–10.5)
CHLORIDE SERPL-SCNC: 105 MMOL/L (ref 98–107)
CHOLEST SERPL-MCNC: 177 MG/DL (ref 0–200)
CO2 SERPL-SCNC: 24.8 MMOL/L (ref 22–29)
CREAT SERPL-MCNC: 0.84 MG/DL (ref 0.76–1.27)
EOSINOPHIL # BLD AUTO: 0.14 10*3/MM3 (ref 0–0.4)
EOSINOPHIL NFR BLD AUTO: 1.6 % (ref 0.3–6.2)
ERYTHROCYTE [DISTWIDTH] IN BLOOD BY AUTOMATED COUNT: 12.3 % (ref 12.3–15.4)
GLOBULIN SER CALC-MCNC: 2.8 GM/DL
GLUCOSE SERPL-MCNC: 105 MG/DL (ref 65–99)
HCT VFR BLD AUTO: 48.2 % (ref 37.5–51)
HDLC SERPL-MCNC: 36 MG/DL (ref 40–60)
HGB BLD-MCNC: 16.8 G/DL (ref 13–17.7)
IMM GRANULOCYTES # BLD AUTO: 0.02 10*3/MM3 (ref 0–0.05)
IMM GRANULOCYTES NFR BLD AUTO: 0.2 % (ref 0–0.5)
LDLC SERPL CALC-MCNC: 118 MG/DL (ref 0–100)
LYMPHOCYTES # BLD AUTO: 2.2 10*3/MM3 (ref 0.7–3.1)
LYMPHOCYTES NFR BLD AUTO: 25.9 % (ref 19.6–45.3)
MCH RBC QN AUTO: 30.8 PG (ref 26.6–33)
MCHC RBC AUTO-ENTMCNC: 34.9 G/DL (ref 31.5–35.7)
MCV RBC AUTO: 88.4 FL (ref 79–97)
MONOCYTES # BLD AUTO: 0.91 10*3/MM3 (ref 0.1–0.9)
MONOCYTES NFR BLD AUTO: 10.7 % (ref 5–12)
NEUTROPHILS # BLD AUTO: 5.16 10*3/MM3 (ref 1.7–7)
NEUTROPHILS NFR BLD AUTO: 60.7 % (ref 42.7–76)
NRBC BLD AUTO-RTO: 0 /100 WBC (ref 0–0.2)
PLATELET # BLD AUTO: 259 10*3/MM3 (ref 140–450)
POTASSIUM SERPL-SCNC: 4.5 MMOL/L (ref 3.5–5.2)
PROT SERPL-MCNC: 7.2 G/DL (ref 6–8.5)
RBC # BLD AUTO: 5.45 10*6/MM3 (ref 4.14–5.8)
SODIUM SERPL-SCNC: 138 MMOL/L (ref 136–145)
TRIGL SERPL-MCNC: 125 MG/DL (ref 0–150)
VLDLC SERPL CALC-MCNC: 23 MG/DL (ref 5–40)
WBC # BLD AUTO: 8.51 10*3/MM3 (ref 3.4–10.8)

## 2021-05-10 ENCOUNTER — OFFICE VISIT (OUTPATIENT)
Dept: INTERNAL MEDICINE | Facility: CLINIC | Age: 56
End: 2021-05-10

## 2021-05-10 VITALS
WEIGHT: 240 LBS | HEIGHT: 72 IN | DIASTOLIC BLOOD PRESSURE: 84 MMHG | SYSTOLIC BLOOD PRESSURE: 129 MMHG | OXYGEN SATURATION: 98 % | TEMPERATURE: 98 F | BODY MASS INDEX: 32.51 KG/M2 | HEART RATE: 66 BPM | RESPIRATION RATE: 14 BRPM

## 2021-05-10 DIAGNOSIS — E78.2 MIXED HYPERLIPIDEMIA: ICD-10-CM

## 2021-05-10 DIAGNOSIS — I10 BENIGN ESSENTIAL HYPERTENSION: Primary | ICD-10-CM

## 2021-05-10 DIAGNOSIS — M25.561 PAIN IN JOINT OF RIGHT KNEE: ICD-10-CM

## 2021-05-10 DIAGNOSIS — I48.20 CHRONIC ATRIAL FIBRILLATION (HCC): ICD-10-CM

## 2021-05-10 DIAGNOSIS — R73.01 IMPAIRED FASTING GLUCOSE: ICD-10-CM

## 2021-05-10 PROCEDURE — 99214 OFFICE O/P EST MOD 30 MIN: CPT | Performed by: INTERNAL MEDICINE

## 2021-05-10 RX ORDER — NAPROXEN SODIUM 220 MG
220 TABLET ORAL 2 TIMES DAILY PRN
COMMUNITY
End: 2022-03-08

## 2021-05-10 RX ORDER — LOSARTAN POTASSIUM 50 MG/1
50 TABLET ORAL DAILY
Qty: 90 TABLET | Refills: 2 | Status: SHIPPED | OUTPATIENT
Start: 2021-05-10 | End: 2022-03-02

## 2021-05-10 RX ORDER — DILTIAZEM HYDROCHLORIDE 120 MG/1
120 CAPSULE, COATED, EXTENDED RELEASE ORAL NIGHTLY
Qty: 90 CAPSULE | Refills: 2 | Status: SHIPPED | OUTPATIENT
Start: 2021-05-10 | End: 2022-03-08 | Stop reason: SDUPTHER

## 2021-05-10 NOTE — PATIENT INSTRUCTIONS
MyPlate from USDA    MyPlate is an outline of a general healthy diet based on the 2010 Dietary Guidelines for Americans, from the U.S. Department of Agriculture (USDA). It sets guidelines for how much food you should eat from each food group based on your age, sex, and level of physical activity.  What are tips for following MyPlate?  To follow MyPlate recommendations:  · Eat a wide variety of fruits and vegetables, grains, and protein foods.  · Serve smaller portions and eat less food throughout the day.  · Limit portion sizes to avoid overeating.  · Enjoy your food.  · Get at least 150 minutes of exercise every week. This is about 30 minutes each day, 5 or more days per week.  It can be difficult to have every meal look like MyPlate. Think about MyPlate as eating guidelines for an entire day, rather than each individual meal.  Fruits and vegetables  · Make half of your plate fruits and vegetables.  · Eat many different colors of fruits and vegetables each day.  · For a 2,000 calorie daily food plan, eat:  ? 2½ cups of vegetables every day.  ? 2 cups of fruit every day.  · 1 cup is equal to:  ? 1 cup raw or cooked vegetables.  ? 1 cup raw fruit.  ? 1 medium-sized orange, apple, or banana.  ? 1 cup 100% fruit or vegetable juice.  ? 2 cups raw leafy greens, such as lettuce, spinach, or kale.  ? ½ cup dried fruit.  Grains  · One fourth of your plate should be grains.  · Make at least half of the grains you eat each day whole grains.  · For a 2,000 calorie daily food plan, eat 6 oz of grains every day.  · 1 oz is equal to:  ? 1 slice bread.  ? 1 cup cereal.  ? ½ cup cooked rice, cereal, or pasta.  Protein  · One fourth of your plate should be protein.  · Eat a wide variety of protein foods, including meat, poultry, fish, eggs, beans, nuts, and tofu.  · For a 2,000 calorie daily food plan, eat 5½ oz of protein every day.  · 1 oz is equal to:  ? 1 oz meat, poultry, or fish.  ? ¼ cup cooked beans.  ? 1 egg.  ? ½ oz nuts  or seeds.  ? 1 Tbsp peanut butter.  Dairy  · Drink fat-free or low-fat (1%) milk.  · Eat or drink dairy as a side to meals.  · For a 2,000 calorie daily food plan, eat or drink 3 cups of dairy every day.  · 1 cup is equal to:  ? 1 cup milk, yogurt, cottage cheese, or soy milk (soy beverage).  ? 2 oz processed cheese.  ? 1½ oz natural cheese.  Fats, oils, salt, and sugars  · Only small amounts of oils are recommended.  · Avoid foods that are high in calories and low in nutritional value (empty calories), like foods high in fat or added sugars.  · Choose foods that are low in salt (sodium). Choose foods that have less than 140 milligrams (mg) of sodium per serving.  · Drink water instead of sugary drinks. Drink enough water each day to keep your urine pale yellow.  Where to find support  · Work with your health care provider or a nutrition specialist (dietitian) to develop a customized eating plan that is right for you.  · Download an krysta (mobile application) to help you track your daily food intake.  Where to find more information  · Go to ChooseMyPlate.gov for more information.  Summary  · MyPlate is a general guideline for healthy eating from the USDA. It is based on the 2010 Dietary Guidelines for Americans.  · In general, fruits and vegetables should take up ½ of your plate, grains should take up ¼ of your plate, and protein should take up ¼ of your plate.  This information is not intended to replace advice given to you by your health care provider. Make sure you discuss any questions you have with your health care provider.  Document Revised: 05/21/2020 Document Reviewed: 03/19/2018  Elsevier Patient Education © 2021 Elsevier Inc.

## 2021-05-10 NOTE — PROGRESS NOTES
"Chief Complaint  Hypertension, Hyperlipidemia, and Atrial Fibrillation    Subjective          Benjie Mcgovern presents to [facility]  History of Present Illness  HPI: Patient is here to follow up on the blood pressure  The patient is taking the blood pressure medications as prescribed and has had no side effects. The patient is also here to follow up on the cholesterol and is trying to follow a diet. The patient   is  due to get lab work done .  The patient also is here to follow-up on atrial fibrillation and needs refills on medications .  He complains of Right knee which is worsening   Hyperlipidemia   Pertinent negatives include no chest pain or shortness of breath.   Hypertension   Pertinent negatives include no chest pain, palpitations or shortness of breath.    Objective   Vital Signs:   /84   Pulse 66   Temp 98 °F (36.7 °C)   Resp 14   Ht 182.9 cm (72.01\")   Wt 109 kg (240 lb)   SpO2 98%   BMI 32.54 kg/m²     Physical Exam  Vitals and nursing note reviewed.   Constitutional:       General: He is not in acute distress.     Appearance: Normal appearance. He is not diaphoretic.   HENT:      Head: Normocephalic and atraumatic.      Right Ear: External ear normal.      Left Ear: External ear normal.      Nose: Nose normal.   Eyes:      Extraocular Movements: Extraocular movements intact.      Conjunctiva/sclera: Conjunctivae normal.   Neck:      Trachea: Trachea normal.   Cardiovascular:      Rate and Rhythm: Normal rate and regular rhythm.      Heart sounds: Normal heart sounds.   Pulmonary:      Effort: Pulmonary effort is normal. No respiratory distress.   Abdominal:      General: Abdomen is flat.   Musculoskeletal:         General: Deformity present.      Cervical back: Neck supple.      Comments: Moves all limbs  Right knee     Skin:     General: Skin is warm and dry.      Findings: No erythema.   Neurological:      Mental Status: He is alert and oriented to person, " place, and time.      Comments: No gross motor or sensory deficits        Result Review :     Common labs    Common Labsle 1/7/21 1/7/21 1/7/21    1003 1003 1003   Glucose  105 (A)    BUN  20    Creatinine  0.84    eGFR Non  Am  95    eGFR African Am  115    Sodium  138    Potassium  4.5    Chloride  105    Calcium  9.6    Total Protein  7.2    Albumin  4.40    Total Bilirubin  0.5    Alkaline Phosphatase  136 (A)    AST (SGOT)  21    ALT (SGPT)  24    WBC 8.51     Hemoglobin 16.8     Hematocrit 48.2     Platelets 259     Total Cholesterol   177   Triglycerides   125   HDL Cholesterol   36 (A)   LDL Cholesterol    118 (A)   (A) Abnormal value       Comments are available for some flowsheets but are not being displayed.                     Assessment and Plan    Diagnoses and all orders for this visit:    1. Benign essential hypertension (Primary)  -     dilTIAZem CD (CARDIZEM CD) 120 MG 24 hr capsule; Take 1 capsule by mouth Every Night.  Dispense: 90 capsule; Refill: 2  -     losartan (COZAAR) 50 MG tablet; Take 1 tablet by mouth Daily.  Dispense: 90 tablet; Refill: 2    2. Mixed hyperlipidemia  -     CBC & Differential  -     Comprehensive Metabolic Panel  -     Lipid Panel    3. Impaired fasting glucose  -     Hemoglobin A1c    4. Chronic atrial fibrillation (CMS/HCC)    5. Pain in joint of right knee    Plan:  1.  Benign essential hypertension: Will continue current medication, low-sodium diet advised, Counseled to regularly check BP at home with goal averaging <130/80.   2.mixed hyperlipidemia: will obtain   fasting CMP and lipid panel.  Diet and exercise counseled,  ldl 118  3.  Impaired glucose: Will obtain CMP and hemoglobin A1c: Diet and exercise counseled  4.  Atrial fibrillation: Rate controlled, will continue current medication  5. Right knee pain : will monitor   I spent 30 minutes caring for Benjie on this date of service. This time includes time spent by me in the following activities:preparing  for the visit, reviewing tests, performing a medically appropriate examination and/or evaluation , counseling and educating the patient/family/caregiver, ordering medications, tests, or procedures and documenting information in the medical record  Follow Up   Return in about 19 weeks (around 9/20/2021).  Patient was given instructions and counseling regarding his condition or for health maintenance advice. Please see specific information pulled into the AVS if appropriate.

## 2022-03-02 DIAGNOSIS — I10 BENIGN ESSENTIAL HYPERTENSION: ICD-10-CM

## 2022-03-02 RX ORDER — LOSARTAN POTASSIUM 50 MG/1
TABLET ORAL
Qty: 90 TABLET | Refills: 2 | Status: SHIPPED | OUTPATIENT
Start: 2022-03-02 | End: 2022-03-08 | Stop reason: SDUPTHER

## 2022-03-02 NOTE — TELEPHONE ENCOUNTER
Rx Refill Note  Requested Prescriptions     Pending Prescriptions Disp Refills   • losartan (COZAAR) 50 MG tablet [Pharmacy Med Name: LOSARTAN POTASSIUM 50 MG TAB] 90 tablet 2     Sig: TAKE 1 TABLET BY MOUTH EVERY DAY      Last office visit with prescribing clinician: 5/10/2021      Next office visit with prescribing clinician: Visit date not found            GONSALO FREEDMAN MA  03/02/22, 09:24 EST

## 2022-03-08 ENCOUNTER — TELEMEDICINE (OUTPATIENT)
Dept: INTERNAL MEDICINE | Facility: CLINIC | Age: 57
End: 2022-03-08

## 2022-03-08 DIAGNOSIS — J01.01 ACUTE RECURRENT MAXILLARY SINUSITIS: ICD-10-CM

## 2022-03-08 DIAGNOSIS — R73.01 IMPAIRED FASTING GLUCOSE: ICD-10-CM

## 2022-03-08 DIAGNOSIS — I10 BENIGN ESSENTIAL HYPERTENSION: Primary | ICD-10-CM

## 2022-03-08 DIAGNOSIS — E78.2 MIXED HYPERLIPIDEMIA: ICD-10-CM

## 2022-03-08 PROCEDURE — 99214 OFFICE O/P EST MOD 30 MIN: CPT | Performed by: INTERNAL MEDICINE

## 2022-03-08 RX ORDER — LOSARTAN POTASSIUM 50 MG/1
50 TABLET ORAL DAILY
Qty: 90 TABLET | Refills: 2 | Status: SHIPPED | OUTPATIENT
Start: 2022-03-08 | End: 2022-07-19 | Stop reason: SDUPTHER

## 2022-03-08 RX ORDER — CEPHALEXIN 500 MG/1
500 CAPSULE ORAL 3 TIMES DAILY
Qty: 30 CAPSULE | Refills: 0 | Status: SHIPPED | OUTPATIENT
Start: 2022-03-08 | End: 2022-03-18

## 2022-03-08 RX ORDER — DILTIAZEM HYDROCHLORIDE 120 MG/1
120 CAPSULE, COATED, EXTENDED RELEASE ORAL NIGHTLY
Qty: 90 CAPSULE | Refills: 2 | Status: SHIPPED | OUTPATIENT
Start: 2022-03-08 | End: 2022-07-19 | Stop reason: SDUPTHER

## 2022-03-08 NOTE — PROGRESS NOTES
You have chosen to receive care through a video  visit. Do you consent to use a video visit for your medical care today? Yes  Parties active with  visit via My Chart  Physician: Evie West MD  Nurse: Duane Saenz CMA  Patient :benjie nuñez   : 1965  Location of Provider : Clinic - [place], [place]  Location of Patient : at home  Patient presents during the COVID-19 pandemic/federally declared state of public health emergency.   This service was conducted via  Video Visit          Chief Complaint  Hypertension and Hyperlipidemia    Subjective          Benjie Nuñez presents to [facility]  History of Present Illness  HPI: Patient is following up on the blood pressure  The patient is taking the blood pressure medications as prescribed and has had no side effects. The patient is following up on the cholesterol and is trying to follow a diet. The patient is   due to get lab work done .  The patient also needs refills on medications .  He also complains of sinus congestion and nasal discharge in the past few days, he states he is not running a fever  Hyperlipidemia   Pertinent negatives include no chest pain or shortness of breath.   Hypertension   Pertinent negatives include no chest pain, palpitations or shortness of breath.    During today's visit, I reviewed the documented allergies, medications, chief complaint, and pertinent vitals.  I have confirmed with the patient that there have been no changes since this information was discussed with my clinical team member.    Objective   Vital Signs:    afebrile ,   Physical Exam   All vitals recorded within this visit are reported by the patient.  General appearance: Normocephalic and nontraumatic  HEENT: External inspection of eyes, ears and nose appears benign, hearing appears intact, maxillary sinus tenderness  Neck: Neck appears supple, trachea in midline, thyroid appears not enlarged  Respiratory: Respiratory  effort appears normal  Musculoskeletal: Moving all limbs  Range of motion of visible joints appears within normal  CNS: No gross motor or sensory deficits  No gross cranial nerve deficits  No tremors  Psychiatry: Alert and oriented x3  Memory appears intact  Mood and affect appears normal  Insight appears normal    Result Review :     Lipid Panel    Lipid Panel 3/8/22   Total Cholesterol 167   Triglycerides 121   HDL Cholesterol 34 (A)   VLDL Cholesterol 22   LDL Cholesterol  111 (A)   (A) Abnormal value       Comments are available for some flowsheets but are not being displayed.                     Assessment and Plan    Diagnoses and all orders for this visit:    1. Benign essential hypertension (Primary)  -     losartan (COZAAR) 50 MG tablet; Take 1 tablet by mouth Daily.  Dispense: 90 tablet; Refill: 2  -     dilTIAZem CD (CARDIZEM CD) 120 MG 24 hr capsule; Take 1 capsule by mouth Every Night.  Dispense: 90 capsule; Refill: 2    2. Mixed hyperlipidemia  -     CBC & Differential  -     Comprehensive Metabolic Panel  -     Lipid Panel    3. Impaired fasting glucose  -     Hemoglobin A1c    4. Acute recurrent maxillary sinusitis  -     cephalexin (KEFLEX) 500 MG capsule; Take 1 capsule by mouth 3 (Three) Times a Day for 10 days.  Dispense: 30 capsule; Refill: 0    Plan:  1.  Benign essential hypertension: Will continue current medication, low-sodium diet advised, Counseled to regularly check BP at home with goal averaging <130/80.   2.mixed hyperlipidemia:   Obtain fasting CMP and lipid panel.  Diet and exercise counseled,  Will continue current medications  3. impaired glucose   : Obtain fasting CMP  and hba1c  , diet and exercise counseled  4.  Acute maxillary sinusitis: We will start oral antibiotics  This was a  video enabled telemedicine encounter.    I spent 30 minutes caring for Benjie on this date of service. This time includes time spent by me in the following activities:preparing for the visit, reviewing  tests, performing a medically appropriate examination and/or evaluation , counseling and educating the patient/family/caregiver, ordering medications, tests, or procedures and documenting information in the medical record  Follow Up   Return in about 4 months (around 7/18/2022).  Patient was given instructions and counseling regarding his condition or for health maintenance advice. Please see specific information pulled into the AVS if appropriate.   This was a video enabled telemedicine encounter.

## 2022-03-09 LAB
ALBUMIN SERPL-MCNC: 4.2 G/DL (ref 3.5–5.2)
ALBUMIN/GLOB SERPL: 1.4 G/DL
ALP SERPL-CCNC: 147 U/L (ref 39–117)
ALT SERPL-CCNC: 28 U/L (ref 1–41)
AST SERPL-CCNC: 24 U/L (ref 1–40)
BASOPHILS # BLD AUTO: 0.06 10*3/MM3 (ref 0–0.2)
BASOPHILS NFR BLD AUTO: 0.7 % (ref 0–1.5)
BILIRUB SERPL-MCNC: 0.6 MG/DL (ref 0–1.2)
BUN SERPL-MCNC: 17 MG/DL (ref 6–20)
BUN/CREAT SERPL: 21.8 (ref 7–25)
CALCIUM SERPL-MCNC: 9.4 MG/DL (ref 8.6–10.5)
CHLORIDE SERPL-SCNC: 102 MMOL/L (ref 98–107)
CHOLEST SERPL-MCNC: 167 MG/DL (ref 0–200)
CO2 SERPL-SCNC: 24.9 MMOL/L (ref 22–29)
CREAT SERPL-MCNC: 0.78 MG/DL (ref 0.76–1.27)
EGFR GENE MUT ANL BLD/T: 105.3 ML/MIN/1.73
EOSINOPHIL # BLD AUTO: 0.1 10*3/MM3 (ref 0–0.4)
EOSINOPHIL NFR BLD AUTO: 1.2 % (ref 0.3–6.2)
ERYTHROCYTE [DISTWIDTH] IN BLOOD BY AUTOMATED COUNT: 12.4 % (ref 12.3–15.4)
GLOBULIN SER CALC-MCNC: 2.9 GM/DL
GLUCOSE SERPL-MCNC: 93 MG/DL (ref 65–99)
HBA1C MFR BLD: 5.7 % (ref 4.8–5.6)
HCT VFR BLD AUTO: 47.2 % (ref 37.5–51)
HDLC SERPL-MCNC: 34 MG/DL (ref 40–60)
HGB BLD-MCNC: 16 G/DL (ref 13–17.7)
IMM GRANULOCYTES # BLD AUTO: 0.02 10*3/MM3 (ref 0–0.05)
IMM GRANULOCYTES NFR BLD AUTO: 0.2 % (ref 0–0.5)
LDLC SERPL CALC-MCNC: 111 MG/DL (ref 0–100)
LYMPHOCYTES # BLD AUTO: 3.12 10*3/MM3 (ref 0.7–3.1)
LYMPHOCYTES NFR BLD AUTO: 38.5 % (ref 19.6–45.3)
MCH RBC QN AUTO: 30.7 PG (ref 26.6–33)
MCHC RBC AUTO-ENTMCNC: 33.9 G/DL (ref 31.5–35.7)
MCV RBC AUTO: 90.6 FL (ref 79–97)
MONOCYTES # BLD AUTO: 0.74 10*3/MM3 (ref 0.1–0.9)
MONOCYTES NFR BLD AUTO: 9.1 % (ref 5–12)
NEUTROPHILS # BLD AUTO: 4.06 10*3/MM3 (ref 1.7–7)
NEUTROPHILS NFR BLD AUTO: 50.3 % (ref 42.7–76)
NRBC BLD AUTO-RTO: 0 /100 WBC (ref 0–0.2)
PLATELET # BLD AUTO: 269 10*3/MM3 (ref 140–450)
POTASSIUM SERPL-SCNC: 4.4 MMOL/L (ref 3.5–5.2)
PROT SERPL-MCNC: 7.1 G/DL (ref 6–8.5)
RBC # BLD AUTO: 5.21 10*6/MM3 (ref 4.14–5.8)
SODIUM SERPL-SCNC: 140 MMOL/L (ref 136–145)
TRIGL SERPL-MCNC: 121 MG/DL (ref 0–150)
VLDLC SERPL CALC-MCNC: 22 MG/DL (ref 5–40)
WBC # BLD AUTO: 8.1 10*3/MM3 (ref 3.4–10.8)

## 2022-07-19 ENCOUNTER — OFFICE VISIT (OUTPATIENT)
Dept: INTERNAL MEDICINE | Facility: CLINIC | Age: 57
End: 2022-07-19

## 2022-07-19 VITALS
HEART RATE: 65 BPM | HEIGHT: 72 IN | WEIGHT: 244 LBS | DIASTOLIC BLOOD PRESSURE: 84 MMHG | BODY MASS INDEX: 33.05 KG/M2 | TEMPERATURE: 98 F | OXYGEN SATURATION: 97 % | RESPIRATION RATE: 16 BRPM | SYSTOLIC BLOOD PRESSURE: 134 MMHG

## 2022-07-19 DIAGNOSIS — I48.20 CHRONIC ATRIAL FIBRILLATION: ICD-10-CM

## 2022-07-19 DIAGNOSIS — I10 BENIGN ESSENTIAL HYPERTENSION: Primary | ICD-10-CM

## 2022-07-19 DIAGNOSIS — E78.2 MIXED HYPERLIPIDEMIA: ICD-10-CM

## 2022-07-19 DIAGNOSIS — R73.01 IMPAIRED FASTING GLUCOSE: ICD-10-CM

## 2022-07-19 PROCEDURE — 99214 OFFICE O/P EST MOD 30 MIN: CPT | Performed by: INTERNAL MEDICINE

## 2022-07-19 RX ORDER — DILTIAZEM HYDROCHLORIDE 120 MG/1
120 CAPSULE, COATED, EXTENDED RELEASE ORAL NIGHTLY
Qty: 90 CAPSULE | Refills: 2 | Status: SHIPPED | OUTPATIENT
Start: 2022-07-19 | End: 2023-01-19 | Stop reason: SDUPTHER

## 2022-07-19 RX ORDER — LOSARTAN POTASSIUM 50 MG/1
50 TABLET ORAL DAILY
Qty: 90 TABLET | Refills: 2 | Status: SHIPPED | OUTPATIENT
Start: 2022-07-19 | End: 2023-01-19 | Stop reason: SDUPTHER

## 2022-07-19 NOTE — PROGRESS NOTES
"Impaired glucose Chief Complaint  Hypertension and Hyperlipidemia    Subjective        Benjie Mcgovern presents to [facility]  History of Present Illness  HPI: Patient is here to follow up on the blood pressure  The patient is taking the blood pressure medications as prescribed and has had no side effects. The patient is also here to follow up on the cholesterol and is trying to follow a diet. The patient   is  due to get lab work done .  The patient also is here to follow-up on atrial fibrillation which is stable and needs refills on medications .   Hyperlipidemia   Pertinent negatives include no chest pain or shortness of breath.   Hypertension   Pertinent negatives include no chest pain, palpitations or shortness of breath.    Objective   Vital Signs:  /84   Pulse 65   Temp 98 °F (36.7 °C)   Resp 16   Ht 182.9 cm (72.01\")   Wt 111 kg (244 lb)   SpO2 97%   BMI 33.08 kg/m²   Estimated body mass index is 33.08 kg/m² as calculated from the following:    Height as of this encounter: 182.9 cm (72.01\").    Weight as of this encounter: 111 kg (244 lb).    BMI is >= 30 and <35. (Class 1 Obesity). The following options were offered after discussion;: weight loss educational material (shared in after visit summary), exercise counseling/recommendations and nutrition counseling/recommendations      Physical Exam  Vitals and nursing note reviewed.   Constitutional:       General: He is not in acute distress.     Appearance: Normal appearance. He is not diaphoretic.   HENT:      Head: Normocephalic and atraumatic.      Right Ear: External ear normal.      Left Ear: External ear normal.      Nose: Nose normal.   Eyes:      Extraocular Movements: Extraocular movements intact.      Conjunctiva/sclera: Conjunctivae normal.   Neck:      Trachea: Trachea normal.   Cardiovascular:      Rate and Rhythm: Normal rate and regular rhythm.      Heart sounds: Normal heart sounds.   Pulmonary:      " Effort: Pulmonary effort is normal. No respiratory distress.   Abdominal:      General: Abdomen is flat.   Musculoskeletal:      Cervical back: Neck supple.      Comments: Moves all limbs   Skin:     General: Skin is warm and dry.      Findings: No erythema.   Neurological:      Mental Status: He is alert and oriented to person, place, and time.      Comments: No gross motor or sensory deficits        Result Review :    Common labs    Common Labsle 3/8/22 3/8/22 3/8/22 3/8/22 7/19/22 7/19/22 7/19/22 7/19/22    1446 1446 1446 1446 0942 0942 0942 0942   Glucose  93    95     BUN  17    15     Creatinine  0.78    0.87     Sodium  140    139     Potassium  4.4    4.5     Chloride  102    106     Calcium  9.4    9.3     Total Protein  7.1    7.1     Albumin  4.20    4.10     Total Bilirubin  0.6    0.3     Alkaline Phosphatase  147 (A)    130 (A)     AST (SGOT)  24    19     ALT (SGPT)  28    28     WBC 8.10    9.07      Hemoglobin 16.0    16.2      Hematocrit 47.2    47.6      Platelets 269    232      Total Cholesterol   167    181    Triglycerides   121    156 (A)    HDL Cholesterol   34 (A)    39 (A)    LDL Cholesterol    111 (A)    114 (A)    Hemoglobin A1C    5.70 (A)    5.70 (A)   (A) Abnormal value       Comments are available for some flowsheets but are not being displayed.                     Assessment and Plan   Diagnoses and all orders for this visit:    1. Benign essential hypertension (Primary)  -     dilTIAZem CD (CARDIZEM CD) 120 MG 24 hr capsule; Take 1 capsule by mouth Every Night.  Dispense: 90 capsule; Refill: 2  -     losartan (COZAAR) 50 MG tablet; Take 1 tablet by mouth Daily.  Dispense: 90 tablet; Refill: 2    2. Mixed hyperlipidemia  -     CBC & Differential  -     Comprehensive Metabolic Panel  -     Lipid Panel    3. Impaired fasting glucose  -     Hemoglobin A1c    4. Chronic atrial fibrillation (HCC)    Plan:  1.  Benign essential hypertension: Will continue current medication, low-sodium  diet advised, Counseled to regularly check BP at home with goal averaging <130/80.   2.mixed hyperlipidemia: will obtain   fasting CMP and lipid panel.  Diet and exercise counseled,     3.  Impaired glucose: will obtain   fasting CMP  and hba1c  , diet and exercise counseled ,   4. Atrial fibrillation: rate controlled,  will continue current medications , per cardiology , labs reviewed       I spent 30 minutes caring for Benjie on this date of service. This time includes time spent by me in the following activities:preparing for the visit, reviewing tests, performing a medically appropriate examination and/or evaluation , counseling and educating the patient/family/caregiver, ordering medications, tests, or procedures and documenting information in the medical record  Follow Up   Return in about 6 months (around 1/5/2023).  Patient was given instructions and counseling regarding his condition or for health maintenance advice. Please see specific information pulled into the AVS if appropriate.

## 2022-07-20 LAB
ALBUMIN SERPL-MCNC: 4.1 G/DL (ref 3.5–5.2)
ALBUMIN/GLOB SERPL: 1.4 G/DL
ALP SERPL-CCNC: 130 U/L (ref 39–117)
ALT SERPL-CCNC: 28 U/L (ref 1–41)
AST SERPL-CCNC: 19 U/L (ref 1–40)
BASOPHILS # BLD AUTO: 0.06 10*3/MM3 (ref 0–0.2)
BASOPHILS NFR BLD AUTO: 0.7 % (ref 0–1.5)
BILIRUB SERPL-MCNC: 0.3 MG/DL (ref 0–1.2)
BUN SERPL-MCNC: 15 MG/DL (ref 6–20)
BUN/CREAT SERPL: 17.2 (ref 7–25)
CALCIUM SERPL-MCNC: 9.3 MG/DL (ref 8.6–10.5)
CHLORIDE SERPL-SCNC: 106 MMOL/L (ref 98–107)
CHOLEST SERPL-MCNC: 181 MG/DL (ref 0–200)
CO2 SERPL-SCNC: 23.6 MMOL/L (ref 22–29)
CREAT SERPL-MCNC: 0.87 MG/DL (ref 0.76–1.27)
EGFRCR SERPLBLD CKD-EPI 2021: 101.3 ML/MIN/1.73
EOSINOPHIL # BLD AUTO: 0.13 10*3/MM3 (ref 0–0.4)
EOSINOPHIL NFR BLD AUTO: 1.4 % (ref 0.3–6.2)
ERYTHROCYTE [DISTWIDTH] IN BLOOD BY AUTOMATED COUNT: 12.7 % (ref 12.3–15.4)
GLOBULIN SER CALC-MCNC: 3 GM/DL
GLUCOSE SERPL-MCNC: 95 MG/DL (ref 65–99)
HBA1C MFR BLD: 5.7 % (ref 4.8–5.6)
HCT VFR BLD AUTO: 47.6 % (ref 37.5–51)
HDLC SERPL-MCNC: 39 MG/DL (ref 40–60)
HGB BLD-MCNC: 16.2 G/DL (ref 13–17.7)
IMM GRANULOCYTES # BLD AUTO: 0.03 10*3/MM3 (ref 0–0.05)
IMM GRANULOCYTES NFR BLD AUTO: 0.3 % (ref 0–0.5)
LDLC SERPL CALC-MCNC: 114 MG/DL (ref 0–100)
LYMPHOCYTES # BLD AUTO: 2.54 10*3/MM3 (ref 0.7–3.1)
LYMPHOCYTES NFR BLD AUTO: 28 % (ref 19.6–45.3)
MCH RBC QN AUTO: 30.9 PG (ref 26.6–33)
MCHC RBC AUTO-ENTMCNC: 34 G/DL (ref 31.5–35.7)
MCV RBC AUTO: 90.7 FL (ref 79–97)
MONOCYTES # BLD AUTO: 0.81 10*3/MM3 (ref 0.1–0.9)
MONOCYTES NFR BLD AUTO: 8.9 % (ref 5–12)
NEUTROPHILS # BLD AUTO: 5.5 10*3/MM3 (ref 1.7–7)
NEUTROPHILS NFR BLD AUTO: 60.7 % (ref 42.7–76)
NRBC BLD AUTO-RTO: 0 /100 WBC (ref 0–0.2)
PLATELET # BLD AUTO: 232 10*3/MM3 (ref 140–450)
POTASSIUM SERPL-SCNC: 4.5 MMOL/L (ref 3.5–5.2)
PROT SERPL-MCNC: 7.1 G/DL (ref 6–8.5)
RBC # BLD AUTO: 5.25 10*6/MM3 (ref 4.14–5.8)
SODIUM SERPL-SCNC: 139 MMOL/L (ref 136–145)
TRIGL SERPL-MCNC: 156 MG/DL (ref 0–150)
VLDLC SERPL CALC-MCNC: 28 MG/DL (ref 5–40)
WBC # BLD AUTO: 9.07 10*3/MM3 (ref 3.4–10.8)

## 2022-08-22 DIAGNOSIS — M25.561 ARTHRALGIA OF RIGHT KNEE: Primary | ICD-10-CM

## 2022-08-23 ENCOUNTER — OFFICE VISIT (OUTPATIENT)
Dept: ORTHOPEDIC SURGERY | Facility: CLINIC | Age: 57
End: 2022-08-23

## 2022-08-23 VITALS — HEIGHT: 72 IN | WEIGHT: 238 LBS | TEMPERATURE: 97.7 F | BODY MASS INDEX: 32.23 KG/M2

## 2022-08-23 DIAGNOSIS — M25.561 ARTHRALGIA OF RIGHT KNEE: ICD-10-CM

## 2022-08-23 DIAGNOSIS — M17.11 PRIMARY OSTEOARTHRITIS OF RIGHT KNEE: Primary | ICD-10-CM

## 2022-08-23 PROCEDURE — 20610 DRAIN/INJ JOINT/BURSA W/O US: CPT | Performed by: PHYSICIAN ASSISTANT

## 2022-08-23 PROCEDURE — 99213 OFFICE O/P EST LOW 20 MIN: CPT | Performed by: PHYSICIAN ASSISTANT

## 2022-08-23 RX ORDER — METHYLPREDNISOLONE ACETATE 40 MG/ML
40 INJECTION, SUSPENSION INTRA-ARTICULAR; INTRALESIONAL; INTRAMUSCULAR; SOFT TISSUE
Status: COMPLETED | OUTPATIENT
Start: 2022-08-23 | End: 2022-08-23

## 2022-08-23 RX ORDER — LIDOCAINE HYDROCHLORIDE 20 MG/ML
2 INJECTION, SOLUTION INFILTRATION; PERINEURAL
Status: COMPLETED | OUTPATIENT
Start: 2022-08-23 | End: 2022-08-23

## 2022-08-23 RX ADMIN — METHYLPREDNISOLONE ACETATE 40 MG: 40 INJECTION, SUSPENSION INTRA-ARTICULAR; INTRALESIONAL; INTRAMUSCULAR; SOFT TISSUE at 14:45

## 2022-08-23 RX ADMIN — LIDOCAINE HYDROCHLORIDE 2 ML: 20 INJECTION, SOLUTION INFILTRATION; PERINEURAL at 14:45

## 2022-08-23 NOTE — PROGRESS NOTES
"Subjective   Patient ID: Benjie Mcgovern is a 57 y.o. right hand dominant male  Pain of the Right Knee (Ongoing pain recently increasing, had left TKA 2020, denies pain or concerns with left) and Chills         History of Present Illness    Patient presents with chronic right pain ongoing for several years. No recent injury or trauma.   Patient mentions he had a left TKA 2020 and has done well with left knee.   The right knee is interfering with ADL's      Past Medical History:   Diagnosis Date   • Arthritis     Left knee   • Atrial fibrillation (HCC) 2003    REPORTS WAS DIAGNOSED IN 2003 AND THAT HE HAS HAD NO EPISODES SINCE THAT TIME   • ED (erectile dysfunction) of non-organic origin    • Elevated cholesterol     REPORTS WAS TOLD \"BORDERLINE\" BUT THAT HE HAS NEVER TAKEN MEDICATION   • Foot pain    • History of exercise stress test 2003    REPORTS WAS TOLD THAT ALL WAS WNL'S   • History of fracture     HISTORY OF LEFT WRIST AS A CHILD - HAD SURGICAL INTERVENTION   • History of kidney stones     REPORTS MULTIPLE EPISODES AND THAT HE HAS REQUIRED SURGICAL INTERVENTION IN THE PAST   • Hyperlipidemia    • Hypertension    • Impaired functional mobility, balance, gait, and endurance    • Wears contact lenses     INSTRUCTED TO LEAVE CONTACTS OUT THE DOS        Past Surgical History:   Procedure Laterality Date   • COLONOSCOPY N/A 8/7/2017    Procedure: COLONOSCOPY WITH BXS, POLYPECTOMY;  Surgeon: Fermín Hamilton MD;  Location: [facility] ENDOSCOPY;  Service:    • FACIAL RECONSTRUCTION SURGERY      FROM DOG BITE AS A CHILD   • FRACTURE SURGERY Left     WRIST AS  A CHILD   • KIDNEY STONE SURGERY     • KNEE ARTHROSCOPY Left 9/27/2018    Procedure: Knee diagnostic arthroscopy, left with partial medial menisectomy, removal of loose bodies and two compartment chondroplasty;  Surgeon: Dean Garcia MD;  Location: [facility] OR;  Service: Orthopedics   • KNEE SURGERY Right 2015   • TOTAL KNEE ARTHROPLASTY Left 3/3/2020    Procedure: " "total knee arthroplasty, Left;  Surgeon: Dean Garcia MD;  Location: [facility];  Service: Orthopedics;  Laterality: Left;       Family History   Problem Relation Age of Onset   • Other Other         brain tumor, renal disease   • Cancer Other    • Stroke Other    • Colon cancer Neg Hx        Social History     Socioeconomic History   • Marital status:    Tobacco Use   • Smoking status: Never Smoker   • Smokeless tobacco: Never Used   Vaping Use   • Vaping Use: Never used   Substance and Sexual Activity   • Alcohol use: No   • Drug use: No   • Sexual activity: Defer         Current Outpatient Medications:   •  aspirin 81 MG EC tablet, Take 81 mg by mouth Daily., Disp: , Rfl:   •  dilTIAZem CD (CARDIZEM CD) 120 MG 24 hr capsule, Take 1 capsule by mouth Every Night., Disp: 90 capsule, Rfl: 2  •  losartan (COZAAR) 50 MG tablet, Take 1 tablet by mouth Daily., Disp: 90 tablet, Rfl: 2    No Known Allergies    Review of Systems   Constitutional: Negative for diaphoresis, fever and unexpected weight change.   HENT: Negative for dental problem and sore throat.    Eyes: Negative for visual disturbance.   Respiratory: Negative for shortness of breath.    Cardiovascular: Negative for chest pain.   Gastrointestinal: Negative for abdominal pain, constipation, diarrhea, nausea and vomiting.   Genitourinary: Negative for difficulty urinating and frequency.   Musculoskeletal: Positive for arthralgias (right knee).   Neurological: Negative for headaches.   Hematological: Does not bruise/bleed easily.       I have reviewed the medical and surgical history, family history, social history, medications, and/or allergies, and the review of systems of this report.    Objective   Temp 97.7 °F (36.5 °C)   Ht 182.9 cm (72.01\")   Wt 108 kg (238 lb)   BMI 32.27 kg/m²    Physical Exam  Vitals and nursing note reviewed.   Constitutional:       Appearance: Normal appearance.   Pulmonary:      Effort: Pulmonary effort is normal. "   Musculoskeletal:      Right knee: Deformity (varus ) and bony tenderness present. No erythema or ecchymosis. Decreased range of motion. Tenderness present over the medial joint line and lateral joint line.      Instability Tests: Anterior drawer test negative. Posterior drawer test negative.   Neurological:      Mental Status: He is alert and oriented to person, place, and time.       Right Knee Exam     Tenderness   The patient is experiencing tenderness in the medial joint line.    Range of Motion   Extension: 5   Flexion: 100            Extremity DVT signs are negative on physical exam with negative Rebekah sign, no calf pain, no palpable cords and no skin tone change   Neurologic Exam     Mental Status   Oriented to person, place, and time.              Assessment & Plan   Independent Review of Radiographic Studies:       X-ray of the right knee 2 view weightbearing performed in the office independently reviewed for the evaluation of knee pain.  Comparison films are available and reviewed.  No evidence of acute fracture.  There does appear to be significant medial joint space bone-on-bone interaction with severe patellofemoral degenerative changes    Large Joint Arthrocentesis: R knee  Date/Time: 8/23/2022 2:45 PM  Consent given by: patient  Site marked: site marked  Timeout: Immediately prior to procedure a time out was called to verify the correct patient, procedure, equipment, support staff and site/side marked as required   Supporting Documentation  Indications: pain   Procedure Details  Location: knee - R knee  Preparation: Patient was prepped and draped in the usual sterile fashion  Needle size: 22 G  Approach: anterolateral  Medications administered: 2 mL lidocaine 2%; 40 mg methylPREDNISolone acetate 40 MG/ML  Patient tolerance: patient tolerated the procedure well with no immediate complications             Diagnoses and all orders for this visit:    1. Primary osteoarthritis of right knee  (Primary)    2. Arthralgia of right knee    Other orders  -     Large Joint Arthrocentesis       Orthopedic activities reviewed and patient expressed appreciation  Discussion of orthopedic goals  Risk, benefits, and merits of treatment alternatives reviewed with the patient and questions answered  The nature of the proposed surgery reviewed with the patient including risks, benefits, rehabilitation, recovery timeframe, and outcome expectations  Ice, heat, and/or modalities as beneficial    Recommendations/Plan:  Exercise, medications, injections, other patient advice, and return appointment as noted.  Patient is encouraged to call or return for any issues or concerns.   He would like to proceed with a right tka. Because the surgery date is more than 6 weeks out, he would like to proceed with a cortisone injection    Patient agreeable to call or return sooner for any concerns.    EMR Dragon-transcription disclaimer:  This encounter note is an electronic transcription of spoken language to printed text.  Electronic transcription of spoken language may permit erroneous or at times nonsensical words or phrases to be inadvertently transcribed.  Although I have reviewed the note for such errors, some may still exist

## 2022-09-12 NOTE — TELEPHONE ENCOUNTER
Caller: Benjie Mcgovern    Relationship: Self    Best call back number: [phone]    Medication needed:   Requested Prescriptions     Pending Prescriptions Disp Refills   • dilTIAZem CD (CARDIZEM CD) 120 MG 24 hr capsule 90 capsule 2     Sig: Take 1 capsule by mouth Every Night.       When do you need the refill by: ASAP    What details did the patient provide when requesting the medication: PATIENT STATES THAT HE HAS 6 PILLS LEFT    Does the patient have less than a 3 day supply:  [] Yes  [x] No    What is the patient's preferred pharmacy: [id number] - [place], [place] - [address] [place] [address] - [phone]  - [phone] FX      Rifampin Counseling: I discussed with the patient the risks of rifampin including but not limited to liver damage, kidney damage, red-orange body fluids, nausea/vomiting and severe allergy.

## 2022-11-08 ENCOUNTER — PREP FOR SURGERY (OUTPATIENT)
Dept: OTHER | Facility: HOSPITAL | Age: 57
End: 2022-11-08

## 2022-11-08 DIAGNOSIS — M17.11 PRIMARY OSTEOARTHRITIS OF RIGHT KNEE: Primary | ICD-10-CM

## 2022-11-08 DIAGNOSIS — M25.561 ARTHRALGIA OF RIGHT KNEE: ICD-10-CM

## 2022-11-08 RX ORDER — TRANEXAMIC ACID 10 MG/ML
1000 INJECTION, SOLUTION INTRAVENOUS
Status: CANCELLED | OUTPATIENT
Start: 2022-11-09 | End: 2022-11-10

## 2022-11-08 RX ORDER — FAMOTIDINE 10 MG/ML
20 INJECTION, SOLUTION INTRAVENOUS
Status: CANCELLED | OUTPATIENT
Start: 2022-11-09 | End: 2022-11-10

## 2022-11-08 RX ORDER — CEFAZOLIN SODIUM 2 G/50ML
2 SOLUTION INTRAVENOUS
Status: CANCELLED | OUTPATIENT
Start: 2022-11-09 | End: 2022-11-10

## 2022-11-14 ENCOUNTER — TELEPHONE (OUTPATIENT)
Dept: PREADMISSION TESTING | Facility: HOSPITAL | Age: 57
End: 2022-11-14

## 2022-11-15 ENCOUNTER — HOSPITAL ENCOUNTER (OUTPATIENT)
Dept: GENERAL RADIOLOGY | Facility: HOSPITAL | Age: 57
Discharge: HOME | End: 2022-11-15

## 2022-11-15 ENCOUNTER — APPOINTMENT (OUTPATIENT)
Dept: PREADMISSION TESTING | Facility: HOSPITAL | Age: 57
End: 2022-11-15

## 2022-11-15 ENCOUNTER — OFFICE VISIT (OUTPATIENT)
Dept: ORTHOPEDIC SURGERY | Facility: CLINIC | Age: 57
End: 2022-11-15

## 2022-11-15 ENCOUNTER — PRE-ADMISSION TESTING (OUTPATIENT)
Dept: PREADMISSION TESTING | Facility: HOSPITAL | Age: 57
End: 2022-11-15

## 2022-11-15 VITALS — TEMPERATURE: 96.9 F | BODY MASS INDEX: 32.23 KG/M2 | WEIGHT: 238 LBS | HEIGHT: 72 IN

## 2022-11-15 VITALS — BODY MASS INDEX: 32.21 KG/M2 | HEIGHT: 72 IN | WEIGHT: 237.8 LBS

## 2022-11-15 DIAGNOSIS — M25.561 ARTHRALGIA OF RIGHT KNEE: ICD-10-CM

## 2022-11-15 DIAGNOSIS — M17.11 PRIMARY OSTEOARTHRITIS OF RIGHT KNEE: ICD-10-CM

## 2022-11-15 DIAGNOSIS — M17.11 PRIMARY OSTEOARTHRITIS OF RIGHT KNEE: Primary | ICD-10-CM

## 2022-11-15 LAB
ALBUMIN SERPL-MCNC: 4.5 G/DL (ref 3.5–5.2)
ALBUMIN/GLOB SERPL: 1.5 G/DL
ALP SERPL-CCNC: 131 U/L (ref 39–117)
ALT SERPL W P-5'-P-CCNC: 19 U/L (ref 1–41)
ANION GAP SERPL CALCULATED.3IONS-SCNC: 10.8 MMOL/L (ref 5–15)
APTT PPP: 31.2 SECONDS (ref 23.5–35.5)
AST SERPL-CCNC: 19 U/L (ref 1–40)
BASOPHILS # BLD AUTO: 0.07 10*3/MM3 (ref 0–0.2)
BASOPHILS NFR BLD AUTO: 0.7 % (ref 0–1.5)
BILIRUB SERPL-MCNC: 0.6 MG/DL (ref 0–1.2)
BILIRUB UR QL STRIP: NEGATIVE
BUN SERPL-MCNC: 16 MG/DL (ref 6–20)
BUN/CREAT SERPL: 21.6 (ref 7–25)
CALCIUM SPEC-SCNC: 9.4 MG/DL (ref 8.6–10.5)
CHLORIDE SERPL-SCNC: 102 MMOL/L (ref 98–107)
CLARITY UR: CLEAR
CO2 SERPL-SCNC: 23.2 MMOL/L (ref 22–29)
COLOR UR: YELLOW
CREAT SERPL-MCNC: 0.74 MG/DL (ref 0.76–1.27)
DEPRECATED RDW RBC AUTO: 42.1 FL (ref 37–54)
EGFRCR SERPLBLD CKD-EPI 2021: 106.3 ML/MIN/1.73
EOSINOPHIL # BLD AUTO: 0.11 10*3/MM3 (ref 0–0.4)
EOSINOPHIL NFR BLD AUTO: 1.1 % (ref 0.3–6.2)
ERYTHROCYTE [DISTWIDTH] IN BLOOD BY AUTOMATED COUNT: 12.8 % (ref 12.3–15.4)
GLOBULIN UR ELPH-MCNC: 3.1 GM/DL
GLUCOSE SERPL-MCNC: 92 MG/DL (ref 65–99)
GLUCOSE UR STRIP-MCNC: NEGATIVE MG/DL
HBA1C MFR BLD: 5.4 % (ref 4.8–5.6)
HCT VFR BLD AUTO: 48.5 % (ref 37.5–51)
HGB BLD-MCNC: 16.4 G/DL (ref 13–17.7)
HGB UR QL STRIP.AUTO: NEGATIVE
IMM GRANULOCYTES # BLD AUTO: 0.03 10*3/MM3 (ref 0–0.05)
IMM GRANULOCYTES NFR BLD AUTO: 0.3 % (ref 0–0.5)
INR PPP: 0.96 (ref 0.9–1.1)
KETONES UR QL STRIP: NEGATIVE
LEUKOCYTE ESTERASE UR QL STRIP.AUTO: NEGATIVE
LYMPHOCYTES # BLD AUTO: 2.05 10*3/MM3 (ref 0.7–3.1)
LYMPHOCYTES NFR BLD AUTO: 21 % (ref 19.6–45.3)
MCH RBC QN AUTO: 30.2 PG (ref 26.6–33)
MCHC RBC AUTO-ENTMCNC: 33.8 G/DL (ref 31.5–35.7)
MCV RBC AUTO: 89.3 FL (ref 79–97)
MONOCYTES # BLD AUTO: 0.84 10*3/MM3 (ref 0.1–0.9)
MONOCYTES NFR BLD AUTO: 8.6 % (ref 5–12)
NEUTROPHILS NFR BLD AUTO: 6.67 10*3/MM3 (ref 1.7–7)
NEUTROPHILS NFR BLD AUTO: 68.3 % (ref 42.7–76)
NITRITE UR QL STRIP: NEGATIVE
NRBC BLD AUTO-RTO: 0 /100 WBC (ref 0–0.2)
PH UR STRIP.AUTO: 5.5 [PH] (ref 5–8)
PLATELET # BLD AUTO: 267 10*3/MM3 (ref 140–450)
PMV BLD AUTO: 11.7 FL (ref 6–12)
POTASSIUM SERPL-SCNC: 4.1 MMOL/L (ref 3.5–5.2)
PROT SERPL-MCNC: 7.6 G/DL (ref 6–8.5)
PROT UR QL STRIP: NEGATIVE
PROTHROMBIN TIME: 13.1 SECONDS (ref 12.5–14.5)
RBC # BLD AUTO: 5.43 10*6/MM3 (ref 4.14–5.8)
SODIUM SERPL-SCNC: 136 MMOL/L (ref 136–145)
SP GR UR STRIP: 1.01 (ref 1–1.03)
UROBILINOGEN UR QL STRIP: NORMAL
WBC NRBC COR # BLD: 9.77 10*3/MM3 (ref 3.4–10.8)

## 2022-11-15 PROCEDURE — 85610 PROTHROMBIN TIME: CPT

## 2022-11-15 PROCEDURE — 83036 HEMOGLOBIN GLYCOSYLATED A1C: CPT

## 2022-11-15 PROCEDURE — 87081 CULTURE SCREEN ONLY: CPT

## 2022-11-15 PROCEDURE — 85025 COMPLETE CBC W/AUTO DIFF WBC: CPT

## 2022-11-15 PROCEDURE — 36415 COLL VENOUS BLD VENIPUNCTURE: CPT

## 2022-11-15 PROCEDURE — 71046 X-RAY EXAM CHEST 2 VIEWS: CPT

## 2022-11-15 PROCEDURE — 81003 URINALYSIS AUTO W/O SCOPE: CPT

## 2022-11-15 PROCEDURE — 93010 ELECTROCARDIOGRAM REPORT: CPT | Performed by: INTERNAL MEDICINE

## 2022-11-15 PROCEDURE — 80053 COMPREHEN METABOLIC PANEL: CPT

## 2022-11-15 PROCEDURE — S0260 H&P FOR SURGERY: HCPCS | Performed by: PHYSICIAN ASSISTANT

## 2022-11-15 PROCEDURE — 85730 THROMBOPLASTIN TIME PARTIAL: CPT

## 2022-11-15 PROCEDURE — 93005 ELECTROCARDIOGRAM TRACING: CPT

## 2022-11-15 NOTE — PROGRESS NOTES
"Benjie Mcgovern is a 57 y.o. right hand dominant male   Pre-op Exam of the Right Knee (TKA scheduled 11/22/22)          CHIEF COMPLAINT:    Pre-operative evaluation with history and physical exam    History of Present Illness      Patient presents for a planned right TKA appt. He has had chronic right knee pain for many years. The pain did not respond to oral analgesics or cortisone injections. He has had good success with a prior left TKA.       PAST MEDICAL HISTORY:    Past Medical History:   Diagnosis Date   • Arthritis     Left knee   • Atrial fibrillation (HCC) 2003    REPORTS WAS DIAGNOSED IN 2003 AND THAT HE HAS HAD NO EPISODES SINCE THAT TIME   • Cataract     unsure which eye   • DVT (deep venous thrombosis) (HCC) 2020    following left total knee   • ED (erectile dysfunction) of non-organic origin    • Elevated cholesterol     REPORTS WAS TOLD \"BORDERLINE\" BUT THAT HE HAS NEVER TAKEN MEDICATION   • Foot pain    • History of exercise stress test 2003    REPORTS WAS TOLD THAT ALL WAS WNL'S   • History of fracture     HISTORY OF LEFT WRIST AS A CHILD - HAD SURGICAL INTERVENTION   • History of kidney stones     REPORTS MULTIPLE EPISODES AND THAT HE HAS REQUIRED SURGICAL INTERVENTION IN THE PAST   • Hyperlipidemia    • Hypertension    • Impaired functional mobility, balance, gait, and endurance    • PONV (postoperative nausea and vomiting)     after getting home on day of discharge   • Wears contact lenses     INSTRUCTED TO LEAVE CONTACTS OUT THE DOS         Current Outpatient Medications:   •  aspirin 81 MG EC tablet, Take 81 mg by mouth Daily., Disp: , Rfl:   •  dilTIAZem CD (CARDIZEM CD) 120 MG 24 hr capsule, Take 1 capsule by mouth Every Night., Disp: 90 capsule, Rfl: 2  •  losartan (COZAAR) 50 MG tablet, Take 1 tablet by mouth Daily., Disp: 90 tablet, Rfl: 2    Past Surgical History:   Procedure Laterality Date   • COLONOSCOPY N/A 08/07/2017    Procedure: COLONOSCOPY WITH BXS, POLYPECTOMY;  Surgeon: Fermín MARTINEZ" "MD Joy;  Location: [facility] ENDOSCOPY;  Service:    • FACIAL RECONSTRUCTION SURGERY      FROM DOG BITE AS A CHILD   • FRACTURE SURGERY Left     WRIST AS  A CHILD   • KIDNEY STONE SURGERY     • KNEE ARTHROSCOPY Left 09/27/2018    Procedure: Knee diagnostic arthroscopy, left with partial medial menisectomy, removal of loose bodies and two compartment chondroplasty;  Surgeon: Dean Garcia MD;  Location: [facility] OR;  Service: Orthopedics   • KNEE SURGERY Right 2015    ATS PMM   • TOTAL KNEE ARTHROPLASTY Left 03/03/2020    Procedure: total knee arthroplasty, Left;  Surgeon: Dean Garcia MD;  Location: [facility] OR;  Service: Orthopedics;  Laterality: Left;   • VASECTOMY         Family History   Problem Relation Age of Onset   • Other Other         brain tumor, renal disease   • Cancer Other    • Stroke Other    • Colon cancer Neg Hx        Social History     Socioeconomic History   • Marital status:    Tobacco Use   • Smoking status: Never   • Smokeless tobacco: Never   Vaping Use   • Vaping Use: Never used   Substance and Sexual Activity   • Alcohol use: No   • Drug use: No   • Sexual activity: Defer        No Known Allergies    Review of Systems   Musculoskeletal: Positive for arthralgias (right knee).       I have reviewed the medical and surgical history, family history, social history, medications, and/or allergies, and the review of systems of this report.    PHYSICAL EXAMINATION:       Temp 96.9 °F (36.1 °C)   Ht 182.9 cm (72\")   Wt 108 kg (238 lb)   BMI 32.28 kg/m²     GENERAL [x] Well developed  []Ill appearing [x] No acute distress    HEENT [x]No acute changes [x] Normocephalic, atraumatic    NECK [x]Supple  [] No midline tenderness    LUNGS [x]Clear bilaterally [x]No wheezes []Rhonchi [] Rales    HEART [x] Regular rate  [x] Regular rhythm [] Irregular    ABDOMEN [x] Soft [x] Not tender [x] Not distended [x] Normal sounds    VAS/EXT [x] Normal Pulses []Edema []Cyanosis             SKIN [x] " Warm [x]Dry []Pink []Ecchymosis []Cool    NEURO [x] Sensation Intact [x] Motor Intact [x] Pulse intact  [] Dysesthesias:_________  []Weakness:__________             Physical Exam  Vitals and nursing note reviewed.   Constitutional:       Appearance: Normal appearance.   Musculoskeletal:      Right knee: No erythema or ecchymosis. Tenderness present over the medial joint line.   Neurological:      Mental Status: He is alert and oriented to person, place, and time.       Right Knee Exam     Range of Motion   Extension: 10   Flexion: 90     Other   Sensation: normal          Extremity DVT signs are negative on physical exam with negative Rebekah sign, no calf pain, no palpable cords and no skin tone change   Neurologic Exam     Mental Status   Oriented to person, place, and time.       Right knee extensor mechanism is intact      DVT Screening: No Yes   Smoker [x] []   Personal/Family History of DVT or PE [] [x]   Sedentary Lifestyle [x] []   BMI >30 [] [x]      Tranexamic acid TXA criteria for usage during arthroplasty surgery.    Previous or Active DVT/PE: YES  Cardiac Stent within 1 year: NO  Embolic/Ischemic stroke within 1 year: NO  Creatinine less than 30ml/min: NO  Congenital Thrombophilia: NO  Hypersensitivity to TXA: NO  Color Blindness: NO  NOTE:  TXA  tranexemic acid summary: THIS PATIENT IS NOT A CANDIDATE FOR IV TXA DURING SURGERY.     Independent Review of Radiographic Studies:    No new imaging done today.  Reviewed imaging with patient at a prior visit.    He would like a same day discharge if possible.     HE WILL NEED 30 Days of DVT ppx        Diagnoses and all orders for this visit:    1. Primary osteoarthritis of right knee (Primary)    2. Arthralgia of right knee         *SPECIAL INSTRUCTIONS:  Multi-modal analgesia.  Tranexamic acid IV protocol (see screening parameters this report).  Multi-modal DVT prophylaxis.  Rehabilitation PT/OT protocol with same day walker ambulation with  therapist.      Risks, benefits, and alternative treatments discussed with the patient: [x] Yes [] No    Risk benefits and merits of the proposed surgery were discussed and the patient's questions were answered risks discussed including and not limited to:  Anesthesia reactions  Blood loss and possible transfusion  Infection  Deep venous thrombosis and pulmonary embolus  Nerve, vascular or tendon injury  Fracture  Deformity  Stiffness  Weakness  Prosthesis and implant issues such as loosening, material wear or dislocation  Skin necrosis  Revision surgery or further treatment  Recurrence of problem and condition     Informed consent: [] Signed  [x] To be obtained at hospital  [] Both    Recommendations/Plan:  Discussion of orthopaedic goals and activities and patient expressed appreciation.  Risk, benefits, and merits of treatment alternatives reviewed with the patient and questions answered  Regular exercise as tolerated  Guided on proper techniques for mobility, strength, agility and/or conditioning exercises        PLANNED SURGICAL PROCEDURE: Right TKA    Patient is encouraged and agreeable to call or return sooner for any issues or concerns.

## 2022-11-16 DIAGNOSIS — M25.561 ARTHRALGIA OF RIGHT KNEE: ICD-10-CM

## 2022-11-16 DIAGNOSIS — M17.11 PRIMARY OSTEOARTHRITIS OF RIGHT KNEE: Primary | ICD-10-CM

## 2022-11-16 LAB
MRSA SPEC QL CULT: NORMAL
QT INTERVAL: 414 MS
QTC INTERVAL: 459 MS

## 2022-11-21 ENCOUNTER — TELEPHONE (OUTPATIENT)
Dept: ORTHOPEDIC SURGERY | Facility: CLINIC | Age: 57
End: 2022-11-21

## 2022-11-21 NOTE — TELEPHONE ENCOUNTER
Patient is scheduled with [facility] per patients request. Start of care scheduled to start Wednesday 11/23/22.

## 2022-11-22 ENCOUNTER — ANESTHESIA EVENT CONVERTED (OUTPATIENT)
Dept: ANESTHESIOLOGY | Facility: HOSPITAL | Age: 57
End: 2022-11-22

## 2022-11-22 ENCOUNTER — APPOINTMENT (OUTPATIENT)
Dept: GENERAL RADIOLOGY | Facility: HOSPITAL | Age: 57
End: 2022-11-22

## 2022-11-22 ENCOUNTER — ANESTHESIA EVENT (OUTPATIENT)
Dept: PERIOP | Facility: HOSPITAL | Age: 57
End: 2022-11-22

## 2022-11-22 ENCOUNTER — HOSPITAL ENCOUNTER (OUTPATIENT)
Facility: HOSPITAL | Age: 57
Discharge: HOME HEALTH | End: 2022-11-23
Attending: ORTHOPAEDIC SURGERY | Admitting: ORTHOPAEDIC SURGERY

## 2022-11-22 ENCOUNTER — ANESTHESIA (OUTPATIENT)
Dept: PERIOP | Facility: HOSPITAL | Age: 57
End: 2022-11-22

## 2022-11-22 DIAGNOSIS — Z96.651 STATUS POST TOTAL KNEE REPLACEMENT USING CEMENT, RIGHT: Primary | ICD-10-CM

## 2022-11-22 DIAGNOSIS — M17.11 PRIMARY OSTEOARTHRITIS OF RIGHT KNEE: ICD-10-CM

## 2022-11-22 DIAGNOSIS — M25.561 ARTHRALGIA OF RIGHT KNEE: ICD-10-CM

## 2022-11-22 PROCEDURE — 86900 BLOOD TYPING SEROLOGIC ABO: CPT | Performed by: PHYSICIAN ASSISTANT

## 2022-11-22 PROCEDURE — 25010000002 FENTANYL CITRATE (PF) 100 MCG/2ML SOLUTION: Performed by: NURSE ANESTHETIST, CERTIFIED REGISTERED

## 2022-11-22 PROCEDURE — 73560 X-RAY EXAM OF KNEE 1 OR 2: CPT

## 2022-11-22 PROCEDURE — 86850 RBC ANTIBODY SCREEN: CPT | Performed by: PHYSICIAN ASSISTANT

## 2022-11-22 PROCEDURE — G0378 HOSPITAL OBSERVATION PER HR: HCPCS

## 2022-11-22 PROCEDURE — 25010000002 MIDAZOLAM PER 1MG: Performed by: NURSE ANESTHETIST, CERTIFIED REGISTERED

## 2022-11-22 PROCEDURE — 25010000002 ROPIVACAINE PER 1 MG: Performed by: ORTHOPAEDIC SURGERY

## 2022-11-22 PROCEDURE — 0 CEFAZOLIN SODIUM-DEXTROSE 2-3 GM-%(50ML) RECONSTITUTED SOLUTION: Performed by: PHYSICIAN ASSISTANT

## 2022-11-22 PROCEDURE — 25010000002 ONDANSETRON PER 1 MG: Performed by: NURSE ANESTHETIST, CERTIFIED REGISTERED

## 2022-11-22 PROCEDURE — 27447 TOTAL KNEE ARTHROPLASTY: CPT | Performed by: ORTHOPAEDIC SURGERY

## 2022-11-22 PROCEDURE — 86901 BLOOD TYPING SEROLOGIC RH(D): CPT | Performed by: PHYSICIAN ASSISTANT

## 2022-11-22 PROCEDURE — 25010000002 CEFAZOLIN PER 500 MG: Performed by: ORTHOPAEDIC SURGERY

## 2022-11-22 PROCEDURE — 25010000002 DEXAMETHASONE PER 1 MG: Performed by: NURSE ANESTHETIST, CERTIFIED REGISTERED

## 2022-11-22 PROCEDURE — 25010000002 MORPHINE PER 10 MG: Performed by: ORTHOPAEDIC SURGERY

## 2022-11-22 PROCEDURE — C1776 JOINT DEVICE (IMPLANTABLE): HCPCS | Performed by: ORTHOPAEDIC SURGERY

## 2022-11-22 PROCEDURE — 25010000002 KETOROLAC TROMETHAMINE PER 15 MG: Performed by: ORTHOPAEDIC SURGERY

## 2022-11-22 PROCEDURE — 25010000002 ROPIVACAINE PER 1 MG: Performed by: NURSE ANESTHETIST, CERTIFIED REGISTERED

## 2022-11-22 PROCEDURE — 0 CEFAZOLIN SODIUM-DEXTROSE 2-3 GM-%(50ML) RECONSTITUTED SOLUTION: Performed by: ORTHOPAEDIC SURGERY

## 2022-11-22 PROCEDURE — 97162 PT EVAL MOD COMPLEX 30 MIN: CPT

## 2022-11-22 PROCEDURE — 25010000002 PROPOFOL 1000 MG/100ML EMULSION: Performed by: NURSE ANESTHETIST, CERTIFIED REGISTERED

## 2022-11-22 PROCEDURE — C1713 ANCHOR/SCREW BN/BN,TIS/BN: HCPCS | Performed by: ORTHOPAEDIC SURGERY

## 2022-11-22 PROCEDURE — 88300 SURGICAL PATH GROSS: CPT

## 2022-11-22 DEVICE — LEGION CRUCIATE RETAINING OXINIUM                                    FEMORAL SIZE 6 RIGHT
Type: IMPLANTABLE DEVICE | Site: KNEE | Status: FUNCTIONAL
Brand: LEGION

## 2022-11-22 DEVICE — GEN II 7.5MM RESUR PAT 32MM
Type: IMPLANTABLE DEVICE | Site: KNEE | Status: FUNCTIONAL
Brand: GENESIS II

## 2022-11-22 DEVICE — IMPLANTABLE DEVICE: Type: IMPLANTABLE DEVICE | Status: FUNCTIONAL

## 2022-11-22 DEVICE — LEGION CR HIGH FLEX XLPE SZ 5-6 9MM
Type: IMPLANTABLE DEVICE | Site: KNEE | Status: FUNCTIONAL
Brand: LEGION

## 2022-11-22 DEVICE — CMT BONE SIMPLEX/P FULL DOSE 10/PK: Type: IMPLANTABLE DEVICE | Site: KNEE | Status: FUNCTIONAL

## 2022-11-22 DEVICE — GENESIS II NON-POROUS TIBIAL                                    BASEPLATE SIZE 6 RIGHT
Type: IMPLANTABLE DEVICE | Site: KNEE | Status: FUNCTIONAL
Brand: GENESIS II

## 2022-11-22 RX ORDER — CEFAZOLIN SODIUM 2 G/50ML
2 SOLUTION INTRAVENOUS EVERY 8 HOURS
Status: COMPLETED | OUTPATIENT
Start: 2022-11-22 | End: 2022-11-23

## 2022-11-22 RX ORDER — SODIUM CHLORIDE, SODIUM LACTATE, POTASSIUM CHLORIDE, CALCIUM CHLORIDE 600; 310; 30; 20 MG/100ML; MG/100ML; MG/100ML; MG/100ML
1000 INJECTION, SOLUTION INTRAVENOUS CONTINUOUS
Status: DISCONTINUED | OUTPATIENT
Start: 2022-11-22 | End: 2022-11-22

## 2022-11-22 RX ORDER — KETAMINE HCL IN NACL, ISO-OSM 100MG/10ML
SYRINGE (ML) INJECTION AS NEEDED
Status: DISCONTINUED | OUTPATIENT
Start: 2022-11-22 | End: 2022-11-22 | Stop reason: SURG

## 2022-11-22 RX ORDER — SCOLOPAMINE TRANSDERMAL SYSTEM 1 MG/1
1 PATCH, EXTENDED RELEASE TRANSDERMAL ONCE
Status: DISCONTINUED | OUTPATIENT
Start: 2022-11-22 | End: 2022-11-22

## 2022-11-22 RX ORDER — FAMOTIDINE 10 MG/ML
20 INJECTION, SOLUTION INTRAVENOUS ONCE
Status: COMPLETED | OUTPATIENT
Start: 2022-11-22 | End: 2022-11-22

## 2022-11-22 RX ORDER — ENOXAPARIN SODIUM 100 MG/ML
30 INJECTION SUBCUTANEOUS EVERY 12 HOURS SCHEDULED
Status: DISCONTINUED | OUTPATIENT
Start: 2022-11-23 | End: 2022-11-23 | Stop reason: HOSPADM

## 2022-11-22 RX ORDER — BUPIVACAINE HCL/0.9 % NACL/PF 0.125 %
4-14 PREFILLED PUMP RESERVOIR EPIDURAL CONTINUOUS
Status: DISCONTINUED | OUTPATIENT
Start: 2022-11-22 | End: 2022-11-23 | Stop reason: HOSPADM

## 2022-11-22 RX ORDER — DOCUSATE SODIUM 100 MG/1
100 CAPSULE, LIQUID FILLED ORAL 2 TIMES DAILY
Status: DISCONTINUED | OUTPATIENT
Start: 2022-11-22 | End: 2022-11-23 | Stop reason: HOSPADM

## 2022-11-22 RX ORDER — DEXAMETHASONE SODIUM PHOSPHATE 4 MG/ML
INJECTION, SOLUTION INTRA-ARTICULAR; INTRALESIONAL; INTRAMUSCULAR; INTRAVENOUS; SOFT TISSUE AS NEEDED
Status: DISCONTINUED | OUTPATIENT
Start: 2022-11-22 | End: 2022-11-22 | Stop reason: SURG

## 2022-11-22 RX ORDER — ASPIRIN 81 MG/1
81 TABLET ORAL DAILY
Status: DISCONTINUED | OUTPATIENT
Start: 2022-11-22 | End: 2022-11-23 | Stop reason: HOSPADM

## 2022-11-22 RX ORDER — ONDANSETRON 2 MG/ML
INJECTION INTRAMUSCULAR; INTRAVENOUS AS NEEDED
Status: DISCONTINUED | OUTPATIENT
Start: 2022-11-22 | End: 2022-11-22 | Stop reason: SURG

## 2022-11-22 RX ORDER — FENTANYL CITRATE 50 UG/ML
INJECTION, SOLUTION INTRAMUSCULAR; INTRAVENOUS AS NEEDED
Status: DISCONTINUED | OUTPATIENT
Start: 2022-11-22 | End: 2022-11-22 | Stop reason: SURG

## 2022-11-22 RX ORDER — ONDANSETRON 4 MG/1
4 TABLET, FILM COATED ORAL EVERY 6 HOURS PRN
Status: DISCONTINUED | OUTPATIENT
Start: 2022-11-22 | End: 2022-11-23 | Stop reason: HOSPADM

## 2022-11-22 RX ORDER — SODIUM CHLORIDE 9 MG/ML
40 INJECTION, SOLUTION INTRAVENOUS AS NEEDED
Status: DISCONTINUED | OUTPATIENT
Start: 2022-11-22 | End: 2022-11-23 | Stop reason: HOSPADM

## 2022-11-22 RX ORDER — ROPIVACAINE HYDROCHLORIDE 5 MG/ML
INJECTION, SOLUTION EPIDURAL; INFILTRATION; PERINEURAL
Status: COMPLETED | OUTPATIENT
Start: 2022-11-22 | End: 2022-11-22

## 2022-11-22 RX ORDER — SODIUM CHLORIDE 0.9 % (FLUSH) 0.9 %
1-10 SYRINGE (ML) INJECTION AS NEEDED
Status: DISCONTINUED | OUTPATIENT
Start: 2022-11-22 | End: 2022-11-23 | Stop reason: HOSPADM

## 2022-11-22 RX ORDER — CEFAZOLIN SODIUM 2 G/50ML
2 SOLUTION INTRAVENOUS ONCE
Status: COMPLETED | OUTPATIENT
Start: 2022-11-22 | End: 2022-11-22

## 2022-11-22 RX ORDER — NALOXONE HCL 0.4 MG/ML
0.1 VIAL (ML) INJECTION
Status: DISCONTINUED | OUTPATIENT
Start: 2022-11-22 | End: 2022-11-23 | Stop reason: HOSPADM

## 2022-11-22 RX ORDER — HYDROCODONE BITARTRATE AND ACETAMINOPHEN 7.5; 325 MG/1; MG/1
1 TABLET ORAL EVERY 4 HOURS PRN
Status: DISCONTINUED | OUTPATIENT
Start: 2022-11-22 | End: 2022-11-23 | Stop reason: HOSPADM

## 2022-11-22 RX ORDER — DILTIAZEM HYDROCHLORIDE 120 MG/1
120 CAPSULE, COATED, EXTENDED RELEASE ORAL NIGHTLY
Status: DISCONTINUED | OUTPATIENT
Start: 2022-11-22 | End: 2022-11-23 | Stop reason: HOSPADM

## 2022-11-22 RX ORDER — LOSARTAN POTASSIUM 50 MG/1
50 TABLET ORAL DAILY
Status: DISCONTINUED | OUTPATIENT
Start: 2022-11-23 | End: 2022-11-23 | Stop reason: HOSPADM

## 2022-11-22 RX ORDER — SODIUM CHLORIDE 0.9 % (FLUSH) 0.9 %
10 SYRINGE (ML) INJECTION EVERY 12 HOURS SCHEDULED
Status: DISCONTINUED | OUTPATIENT
Start: 2022-11-22 | End: 2022-11-23 | Stop reason: HOSPADM

## 2022-11-22 RX ORDER — ONDANSETRON 2 MG/ML
4 INJECTION INTRAMUSCULAR; INTRAVENOUS EVERY 6 HOURS PRN
Status: DISCONTINUED | OUTPATIENT
Start: 2022-11-22 | End: 2022-11-23 | Stop reason: HOSPADM

## 2022-11-22 RX ORDER — MIDAZOLAM HYDROCHLORIDE 2 MG/2ML
INJECTION, SOLUTION INTRAMUSCULAR; INTRAVENOUS AS NEEDED
Status: DISCONTINUED | OUTPATIENT
Start: 2022-11-22 | End: 2022-11-22 | Stop reason: SURG

## 2022-11-22 RX ORDER — PROPOFOL 10 MG/ML
INJECTION, EMULSION INTRAVENOUS CONTINUOUS PRN
Status: DISCONTINUED | OUTPATIENT
Start: 2022-11-22 | End: 2022-11-22 | Stop reason: SURG

## 2022-11-22 RX ORDER — SODIUM CHLORIDE, SODIUM LACTATE, POTASSIUM CHLORIDE, CALCIUM CHLORIDE 600; 310; 30; 20 MG/100ML; MG/100ML; MG/100ML; MG/100ML
100 INJECTION, SOLUTION INTRAVENOUS CONTINUOUS
Status: DISCONTINUED | OUTPATIENT
Start: 2022-11-22 | End: 2022-11-23 | Stop reason: HOSPADM

## 2022-11-22 RX ORDER — SODIUM CHLORIDE 0.9 % (FLUSH) 0.9 %
10 SYRINGE (ML) INJECTION AS NEEDED
Status: DISCONTINUED | OUTPATIENT
Start: 2022-11-22 | End: 2022-11-22 | Stop reason: HOSPADM

## 2022-11-22 RX ADMIN — ONDANSETRON 4 MG: 2 INJECTION INTRAMUSCULAR; INTRAVENOUS at 08:35

## 2022-11-22 RX ADMIN — HYDROCODONE BITARTRATE AND ACETAMINOPHEN 1 TABLET: 7.5; 325 TABLET ORAL at 21:04

## 2022-11-22 RX ADMIN — Medication 25 MG: at 08:08

## 2022-11-22 RX ADMIN — Medication 8 ML/HR: at 11:05

## 2022-11-22 RX ADMIN — MIDAZOLAM HYDROCHLORIDE 2 MG: 1 INJECTION, SOLUTION INTRAMUSCULAR; INTRAVENOUS at 07:40

## 2022-11-22 RX ADMIN — ROPIVACAINE HYDROCHLORIDE 15 ML: 5 INJECTION, SOLUTION EPIDURAL; INFILTRATION; PERINEURAL at 07:30

## 2022-11-22 RX ADMIN — CEFAZOLIN SODIUM 2 G: 2 SOLUTION INTRAVENOUS at 08:05

## 2022-11-22 RX ADMIN — PROPOFOL 100 MCG/KG/MIN: 10 INJECTION, EMULSION INTRAVENOUS at 08:21

## 2022-11-22 RX ADMIN — SCOPALAMINE 1 PATCH: 1 PATCH, EXTENDED RELEASE TRANSDERMAL at 07:41

## 2022-11-22 RX ADMIN — SODIUM CHLORIDE, POTASSIUM CHLORIDE, SODIUM LACTATE AND CALCIUM CHLORIDE 100 ML/HR: 600; 310; 30; 20 INJECTION, SOLUTION INTRAVENOUS at 12:30

## 2022-11-22 RX ADMIN — DEXAMETHASONE SODIUM PHOSPHATE 8 MG: 4 INJECTION, SOLUTION INTRAMUSCULAR; INTRAVENOUS at 08:35

## 2022-11-22 RX ADMIN — ASPIRIN 81 MG: 81 TABLET, COATED ORAL at 12:51

## 2022-11-22 RX ADMIN — CEFAZOLIN SODIUM 2 G: 2 SOLUTION INTRAVENOUS at 14:08

## 2022-11-22 RX ADMIN — FENTANYL CITRATE 100 MCG: 50 INJECTION INTRAMUSCULAR; INTRAVENOUS at 07:40

## 2022-11-22 RX ADMIN — CEFAZOLIN SODIUM 2 G: 2 SOLUTION INTRAVENOUS at 23:22

## 2022-11-22 RX ADMIN — Medication 25 MG: at 08:03

## 2022-11-22 RX ADMIN — DILTIAZEM HYDROCHLORIDE 120 MG: 120 CAPSULE, COATED, EXTENDED RELEASE ORAL at 21:04

## 2022-11-22 RX ADMIN — FAMOTIDINE 20 MG: 10 INJECTION INTRAVENOUS at 07:13

## 2022-11-22 RX ADMIN — SODIUM CHLORIDE, POTASSIUM CHLORIDE, SODIUM LACTATE AND CALCIUM CHLORIDE 1000 ML: 600; 310; 30; 20 INJECTION, SOLUTION INTRAVENOUS at 07:11

## 2022-11-22 NOTE — ANESTHESIA POSTPROCEDURE EVALUATION
Patient: Benjie Mcgovern    Procedure Summary     Date: 11/22/22 Room / Location: [facility] OR  / [facility] OR    Anesthesia Start: 0816 Anesthesia Stop: 1049    Procedure: Right total knee arthroplasty (Right: Knee) Diagnosis:       Primary osteoarthritis of right knee      Arthralgia of right knee      (Primary osteoarthritis of right knee [M17.11])      (Arthralgia of right knee [M25.561])    Surgeons: Dean Garcia MD Provider: Noe Tubbs CRNA    Anesthesia Type: spinal, regional, general ASA Status: 3          Anesthesia Type: spinal, regional, general    Vitals  Vitals Value Taken Time   BP 89/65 11/22/22 1045   Temp     Pulse 80 11/22/22 1049   Resp     SpO2 95 % 11/22/22 1049   Vitals shown include unvalidated device data.        Post Anesthesia Care and Evaluation    Patient location during evaluation: bedside  Patient participation: complete - patient participated  Level of consciousness: awake and alert  Pain score: 0  Pain management: adequate    Airway patency: patent  Anesthetic complications: No anesthetic complications  PONV Status: none  Cardiovascular status: acceptable  Respiratory status: acceptable  Hydration status: acceptable

## 2022-11-22 NOTE — ANESTHESIA PREPROCEDURE EVALUATION
Anesthesia Evaluation     Patient summary reviewed and Nursing notes reviewed   no history of anesthetic complications:  NPO Solid Status: > 8 hours  NPO Liquid Status: > 8 hours           Airway   Mallampati: I  TM distance: >3 FB  Neck ROM: full  no difficulty expected  Dental - normal exam     Pulmonary - negative pulmonary ROS and normal exam   Cardiovascular - normal exam    (+) hypertension, dysrhythmias Atrial Fib, DVT, hyperlipidemia,       Neuro/Psych  (+) psychiatric history,    GI/Hepatic/Renal/Endo - negative ROS     Musculoskeletal     Abdominal    Substance History - negative use     OB/GYN negative ob/gyn ROS         Other   arthritis,                        Anesthesia Plan    ASA 3     spinal, regional and general     intravenous induction     Anesthetic plan, risks, benefits, and alternatives have been provided, discussed and informed consent has been obtained with: patient.

## 2022-11-22 NOTE — ANESTHESIA PROCEDURE NOTES
Peripheral Block      Patient reassessed immediately prior to procedure    Start time: 11/22/2022 7:30 AM  Stop time: 11/22/2022 7:45 AM  Reason for block: at surgeon's request and post-op pain management  Preanesthetic Checklist  Completed: patient identified, IV checked, site marked, risks and benefits discussed, surgical consent, monitors and equipment checked, pre-op evaluation and timeout performed  Prep:  Pt Position: supine  Sterile barriers:cap, gloves, mask and sterile barriers  Prep: ChloraPrep  Patient monitoring: blood pressure monitoring, continuous pulse oximetry and EKG  Procedure    Guidance:ultrasound guided    ULTRASOUND INTERPRETATION.  Using ultrasound guidance a 21 G gauge needle was placed in close proximity to the femoral nerve, at which point, under ultrasound guidance anesthetic was injected in the area of the nerve and spread of the anesthesia was seen on ultrasound in close proximity thereto.  There were no abnormalities seen on ultrasound; a digital image was taken; and the patient tolerated the procedure with no complications. Images:still images obtained    Laterality:right  Block Type:adductor canal block  Injection Technique:catheter  Needle Type:echogenic  Needle Gauge:21 G  Resistance on Injection: none  Cath Depth at skin: 9 cm    Medications Used: ropivacaine (NAROPIN) injection 0.5 % - Peripheral Nerve   15 mL - 11/22/2022 7:30:00 AM      Medications  Comment:Adjuncts per total volume of LA:    Decadron 10 mg PSF    If required, intravenous sedation was given -- see meds on anesthesia record.    Post Assessment  Injection Assessment: negative aspiration for heme, no paresthesia on injection and incremental injection  Patient Tolerance:comfortable throughout block  Complications:no  Additional Notes  Procedure:          CATHETER ADDUCTOR CANAL BLOCK                                                             CATHETER at skin: 9                                Patient analgesia was  achieved with IV Sedation( see meds)       The pt was placed in the Supine position.  The Insertion site was  prepped and Draped in sterile fashion.  A  I-Flow 18g echogenic needle was then  inserted approximately midline, mid-thigh and advanced In-plane with Ultrasound guidance.  Normal Saline PSF was utilized for hydrodissection of tissue.  The Vastus medialis and Sartorius muscle where visualized and the needle tip was placed in the adductor canal,  lateral to the femoral artery.  LA injection spread was visualized, injection was incremental 1-5 ml, injection pressure was normal or little; no intraneural injection; no vascular injection.  LA dose was injected thru the needle (see dose above).  I-flow 20g catheter was placed via the needle with ultrasound visualization and confirmation with NS fluid bolus or air injection. The needle was then removed and the skin was sealed with Skin Affiix at catheter insertion site.  Skin was prepped with benzoin or mastisol and the labeled curled catheter was secured with steristrips and a transparent dressing.    +++++++++++++++++++++++++++++++++++++++++

## 2022-11-22 NOTE — ANESTHESIA PROCEDURE NOTES
Spinal Block      Patient location during procedure: OR  Start Time: 11/22/2022 8:04 AM  Stop Time: 11/22/2022 8:12 AM  Indication:procedure for pain  Performed By  CRNA/SHAE: Noe Tubbs CRNA  Preanesthetic Checklist  Completed: patient identified, IV checked, site marked, risks and benefits discussed, surgical consent, monitors and equipment checked, pre-op evaluation and timeout performed  Spinal Block Prep:  Patient Position:sitting  Sterile Tech:cap, gloves, gown, mask and sterile barriers  Prep:Chloraprep  Patient Monitoring:blood pressure monitoring, continuous pulse oximetry and EKG    Spinal Block Procedure  Approach:midline  Guidance:landmark technique and palpation technique  Location:L3-L4  Needle Type:Quincke  Needle Gauge:25 G  Placement of Spinal needle event:cerebrospinal fluid aspirated  Paresthesia: no  Fluid Appearance:clear     Post Assessment  Patient Tolerance:patient tolerated the procedure well with no apparent complications  Complications no  Additional Notes  Risks discussed , including but not limited to:  Headache, itching, n&v, infection, failure, decreased bp. Permanent chronic back pain, nerve damage, paralysis, etc.

## 2022-11-23 ENCOUNTER — READMISSION MANAGEMENT (OUTPATIENT)
Dept: CALL CENTER | Facility: HOSPITAL | Age: 57
End: 2022-11-23

## 2022-11-23 VITALS
WEIGHT: 237.2 LBS | BODY MASS INDEX: 32.13 KG/M2 | RESPIRATION RATE: 16 BRPM | DIASTOLIC BLOOD PRESSURE: 65 MMHG | HEART RATE: 91 BPM | SYSTOLIC BLOOD PRESSURE: 118 MMHG | TEMPERATURE: 97.8 F | HEIGHT: 72 IN | OXYGEN SATURATION: 94 %

## 2022-11-23 DIAGNOSIS — Z96.651 S/P TKR (TOTAL KNEE REPLACEMENT) USING CEMENT, RIGHT: Primary | ICD-10-CM

## 2022-11-23 LAB
ANION GAP SERPL CALCULATED.3IONS-SCNC: 10.8 MMOL/L (ref 5–15)
BUN SERPL-MCNC: 18 MG/DL (ref 6–20)
BUN/CREAT SERPL: 22.5 (ref 7–25)
CALCIUM SPEC-SCNC: 8.7 MG/DL (ref 8.6–10.5)
CHLORIDE SERPL-SCNC: 100 MMOL/L (ref 98–107)
CO2 SERPL-SCNC: 26.2 MMOL/L (ref 22–29)
CREAT SERPL-MCNC: 0.8 MG/DL (ref 0.76–1.27)
DEPRECATED RDW RBC AUTO: 42 FL (ref 37–54)
EGFRCR SERPLBLD CKD-EPI 2021: 103.9 ML/MIN/1.73
ERYTHROCYTE [DISTWIDTH] IN BLOOD BY AUTOMATED COUNT: 12.7 % (ref 12.3–15.4)
GLUCOSE SERPL-MCNC: 137 MG/DL (ref 65–99)
HCT VFR BLD AUTO: 38.3 % (ref 37.5–51)
HGB BLD-MCNC: 13.1 G/DL (ref 13–17.7)
LYMPHOCYTES # BLD MANUAL: 3.18 10*3/MM3 (ref 0.7–3.1)
LYMPHOCYTES NFR BLD MANUAL: 6 % (ref 5–12)
MCH RBC QN AUTO: 30.8 PG (ref 26.6–33)
MCHC RBC AUTO-ENTMCNC: 34.2 G/DL (ref 31.5–35.7)
MCV RBC AUTO: 90.1 FL (ref 79–97)
MONOCYTES # BLD: 1.19 10*3/MM3 (ref 0.1–0.9)
NEUTROPHILS # BLD AUTO: 15.51 10*3/MM3 (ref 1.7–7)
NEUTROPHILS NFR BLD MANUAL: 70 % (ref 42.7–76)
NEUTS BAND NFR BLD MANUAL: 8 % (ref 0–5)
PLATELET # BLD AUTO: 255 10*3/MM3 (ref 140–450)
PMV BLD AUTO: 11.3 FL (ref 6–12)
POTASSIUM SERPL-SCNC: 4.4 MMOL/L (ref 3.5–5.2)
RBC # BLD AUTO: 4.25 10*6/MM3 (ref 4.14–5.8)
RBC MORPH BLD: NORMAL
REF LAB TEST METHOD: NORMAL
SCAN SLIDE: NORMAL
SMALL PLATELETS BLD QL SMEAR: ADEQUATE
SODIUM SERPL-SCNC: 137 MMOL/L (ref 136–145)
VARIANT LYMPHS NFR BLD MANUAL: 16 % (ref 19.6–45.3)
WBC MORPH BLD: NORMAL
WBC NRBC COR # BLD: 19.88 10*3/MM3 (ref 3.4–10.8)

## 2022-11-23 PROCEDURE — 99024 POSTOP FOLLOW-UP VISIT: CPT | Performed by: PHYSICIAN ASSISTANT

## 2022-11-23 PROCEDURE — 25010000002 ENOXAPARIN PER 10 MG: Performed by: ORTHOPAEDIC SURGERY

## 2022-11-23 PROCEDURE — 97116 GAIT TRAINING THERAPY: CPT

## 2022-11-23 PROCEDURE — 85025 COMPLETE CBC W/AUTO DIFF WBC: CPT | Performed by: ORTHOPAEDIC SURGERY

## 2022-11-23 PROCEDURE — G0378 HOSPITAL OBSERVATION PER HR: HCPCS

## 2022-11-23 PROCEDURE — 97110 THERAPEUTIC EXERCISES: CPT

## 2022-11-23 PROCEDURE — 85007 BL SMEAR W/DIFF WBC COUNT: CPT | Performed by: ORTHOPAEDIC SURGERY

## 2022-11-23 PROCEDURE — 97165 OT EVAL LOW COMPLEX 30 MIN: CPT

## 2022-11-23 PROCEDURE — 99214 OFFICE O/P EST MOD 30 MIN: CPT | Performed by: NURSE PRACTITIONER

## 2022-11-23 PROCEDURE — 80048 BASIC METABOLIC PNL TOTAL CA: CPT | Performed by: ORTHOPAEDIC SURGERY

## 2022-11-23 RX ORDER — DOCUSATE SODIUM 100 MG/1
100 CAPSULE, LIQUID FILLED ORAL 2 TIMES DAILY
Qty: 20 CAPSULE | Refills: 0 | Status: SHIPPED | OUTPATIENT
Start: 2022-11-23 | End: 2023-01-19

## 2022-11-23 RX ORDER — HYDROCODONE BITARTRATE AND ACETAMINOPHEN 7.5; 325 MG/1; MG/1
1 TABLET ORAL EVERY 6 HOURS PRN
Qty: 28 TABLET | Refills: 0 | Status: SHIPPED | OUTPATIENT
Start: 2022-11-23 | End: 2023-01-19

## 2022-11-23 RX ADMIN — ENOXAPARIN SODIUM 30 MG: 30 INJECTION SUBCUTANEOUS at 08:35

## 2022-11-23 RX ADMIN — LOSARTAN POTASSIUM 50 MG: 50 TABLET, FILM COATED ORAL at 08:37

## 2022-11-23 RX ADMIN — HYDROCODONE BITARTRATE AND ACETAMINOPHEN 1 TABLET: 7.5; 325 TABLET ORAL at 05:55

## 2022-11-23 RX ADMIN — Medication 10 ML: at 08:42

## 2022-11-23 RX ADMIN — ASPIRIN 81 MG: 81 TABLET, COATED ORAL at 08:36

## 2022-11-23 NOTE — OUTREACH NOTE
Prep Survey    Flowsheet Row Responses   [facility] patient discharged from? [place]   Is LACE score < 7 ? Yes   Emergency Room discharge w/ pulse ox? No   Eligibility [facility]   Date of Admission 11/22/22   Date of Discharge 11/23/22   Discharge Disposition Home or Self Care   Discharge diagnosis Right total knee arthroplasty   Does the patient have one of the following disease processes/diagnoses(primary or secondary)? Total Joint Replacement   Does the patient have Home health ordered? Yes   What is the Home health agency?  [facility]    Is there a DME ordered? No   Prep survey completed? Yes          TOYA BRODERICK - Registered Nurse

## 2022-11-25 ENCOUNTER — TRANSITIONAL CARE MANAGEMENT TELEPHONE ENCOUNTER (OUTPATIENT)
Dept: CALL CENTER | Facility: HOSPITAL | Age: 57
End: 2022-11-25

## 2022-11-25 NOTE — OUTREACH NOTE
Call Center TCM Note    Flowsheet Row Responses   [facility] patient discharged from? [place]   Does the patient have one of the following disease processes/diagnoses(primary or secondary)? Total Joint Replacement   Joint surgery performed? Knee   TCM attempt successful? Yes   Call start time 1530   Call end time 1533   Discharge diagnosis Right total knee arthroplasty   Does the patient have all medications related to this admission filled (includes all antibiotics, pain medications, etc.) Yes   Is the patient taking all medications as directed (includes completed medication regime)? Yes   Is the patient able to teach back alternate methods of pain control? Ice   Comments Hosp dc fu apt on 11/29/22 with PCP group   Does the patient have an appointment with their PCP within 7 days of discharge? Yes   What is the Home health agency?  [facility]    Has home health visited the patient within 72 hours of discharge? Yes   Home health comments PT visited 11/25/22   Psychosocial issues? No   Has the patient began therapy sessions (either in the home or as an out patient)? Yes   If the patient has started attending therapy, what post op day did they begin to attend (either in home or as an out patient)?   PT today 11/25/22   Does the patient have a wound vac in place? No   Has the patient fallen since discharge? No   Did the patient receive a copy of their discharge instructions? Yes   Nursing interventions Reviewed instructions with patient   What is the patient's perception of their functional status since discharge? Improving   Is the patient able to teach back signs and symptoms of infection? Temp >100.4 for 24h or longer, Incisional drainage, Blisters around incision, Increased swelling or redness around incision (not associated with surgical edema), Severe discomfort or pain, Changes in mobility, Shortness of breath or chest pain   Is the patient able to teach back how to prevent infection? Check incision  daily, Wash hands before and after touching incision, Keep incision covered if drainage, Shower only as directed by surgeon, Eat well-balanced diet   Is the patient able to teach back signs and symptoms of DVT? Redness in calf, Swelling in calf, Area hot to touch, Severe pain in calf, Shortness of breath or chest pain   Is the patient/caregiver able to teach back the hierarchy of who to call/visit for symptoms/problems? PCP, Specialist, Home health nurse, Urgent Care, ED, 911 Yes   TCM call completed? Yes   Call end time [id number]          Vicki Machuca RN    11/25/2022, 15:33 EST

## 2022-11-28 ENCOUNTER — TELEPHONE (OUTPATIENT)
Dept: ORTHOPEDIC SURGERY | Facility: CLINIC | Age: 57
End: 2022-11-28

## 2022-11-28 NOTE — TELEPHONE ENCOUNTER
I spoke with patient, he stated he is feeling fine after surgery, no fever, no concerns, has not had to take the post op pain medication since yesterday, he wanted to cancel his PCP appt since his wife would have to take off work, since he is feeling fine, he will cancel and let us or PCP know of any issues or concerns

## 2022-11-28 NOTE — TELEPHONE ENCOUNTER
Caller: Benjie Mcgovern A    Relationship: Self    Best call back number:     What is the best time to reach you: ANY     Who are you requesting to speak with (clinical staff, provider,  specific staff member): CLINICAL    What was the call regarding: PATIENT HAS APPT TOMORROW BUT SAID HE HAS BEEN FINE AND HASNT HAD ANY FEVERS OR ISSUES.  PLEASE CALL HIM     Do you require a callback: YES

## 2022-12-06 ENCOUNTER — OFFICE VISIT (OUTPATIENT)
Dept: ORTHOPEDIC SURGERY | Facility: CLINIC | Age: 57
End: 2022-12-06

## 2022-12-06 VITALS — BODY MASS INDEX: 32.1 KG/M2 | TEMPERATURE: 97.3 F | HEIGHT: 72 IN | WEIGHT: 237 LBS

## 2022-12-06 DIAGNOSIS — Z96.651 S/P TKR (TOTAL KNEE REPLACEMENT) USING CEMENT, RIGHT: Primary | ICD-10-CM

## 2022-12-06 PROCEDURE — 99024 POSTOP FOLLOW-UP VISIT: CPT | Performed by: PHYSICIAN ASSISTANT

## 2022-12-06 RX ORDER — ORPHENADRINE CITRATE 100 MG/1
100 TABLET, EXTENDED RELEASE ORAL 2 TIMES DAILY PRN
Qty: 20 TABLET | Refills: 0 | Status: SHIPPED | OUTPATIENT
Start: 2022-12-06 | End: 2023-01-19

## 2022-12-06 NOTE — PROGRESS NOTES
"Subjective   Patient ID: Benjie Mcgovern is a 57 y.o. right hand dominant male is here today for a post-operative visit.  Post-op of the Right Knee (Status post TKA on 11/22/2022.)          CHIEF COMPLAINT:    Initial post op for right TKA    History of Present Illness      Pain controlled: [] no   [x] yes   Medication refill requested: [x] no   [] yes    Patient compliant with instructions: [] no   [x] yes   Other: Reports good progress since surgery.  He has trouble sleeping at night. Denies CP or SOA     Past Medical History:   Diagnosis Date   • Arthritis     Left knee   • Atrial fibrillation (HCC) 2003    REPORTS WAS DIAGNOSED IN 2003 AND THAT HE HAS HAD NO EPISODES SINCE THAT TIME   • Cataract     unsure which eye   • DVT (deep venous thrombosis) (HCC) 2020    following left total knee   • ED (erectile dysfunction) of non-organic origin    • Elevated cholesterol     REPORTS WAS TOLD \"BORDERLINE\" BUT THAT HE HAS NEVER TAKEN MEDICATION   • Foot pain    • History of exercise stress test 2003    REPORTS WAS TOLD THAT ALL WAS WNL'S   • History of fracture     HISTORY OF LEFT WRIST AS A CHILD - HAD SURGICAL INTERVENTION   • History of kidney stones     REPORTS MULTIPLE EPISODES AND THAT HE HAS REQUIRED SURGICAL INTERVENTION IN THE PAST   • Hyperlipidemia    • Hypertension    • Impaired functional mobility, balance, gait, and endurance    • PONV (postoperative nausea and vomiting)     after getting home on day of discharge   • Wears contact lenses     INSTRUCTED TO LEAVE CONTACTS OUT THE DOS        Past Surgical History:   Procedure Laterality Date   • COLONOSCOPY N/A 08/07/2017    Procedure: COLONOSCOPY WITH BXS, POLYPECTOMY;  Surgeon: Fermín Hamilton MD;  Location: [facility] ENDOSCOPY;  Service:    • FACIAL RECONSTRUCTION SURGERY      FROM DOG BITE AS A CHILD   • FRACTURE SURGERY Left     WRIST AS  A CHILD   • KIDNEY STONE SURGERY     • KNEE ARTHROSCOPY Left 09/27/2018    Procedure: Knee diagnostic arthroscopy, left " "with partial medial menisectomy, removal of loose bodies and two compartment chondroplasty;  Surgeon: Dean Garcia MD;  Location: [facility];  Service: Orthopedics   • KNEE SURGERY Right 2015    ATS PMM   • TOTAL KNEE ARTHROPLASTY Left 03/03/2020    Procedure: total knee arthroplasty, Left;  Surgeon: Dean Garcia MD;  Location: [facility];  Service: Orthopedics;  Laterality: Left;   • TOTAL KNEE ARTHROPLASTY Right 11/22/2022    Procedure: Right total knee arthroplasty;  Surgeon: Dean Garcia MD;  Location: [facility];  Service: Orthopedics;  Laterality: Right;   • VASECTOMY         No Known Allergies    Review of Systems   Constitutional: Negative for fever.   HENT: Negative for dental problem and voice change.    Eyes: Negative for visual disturbance.   Respiratory: Negative for shortness of breath.    Cardiovascular: Negative for chest pain.   Gastrointestinal: Negative for abdominal pain.   Genitourinary: Negative for dysuria.   Musculoskeletal: Positive for gait problem (presents with cane) and joint swelling (right knee). Negative for arthralgias.   Skin: Negative for rash.   Neurological: Negative for speech difficulty.   Hematological: Does not bruise/bleed easily.   Psychiatric/Behavioral: Negative for confusion.     I have reviewed the medical and surgical history, family history, social history, medications, and/or allergies, and the review of systems of this report.    Objective   Temp 97.3 °F (36.3 °C)   Ht 182.2 cm (71.73\")   Wt 108 kg (237 lb)   BMI 32.38 kg/m²       Signs of infection: [x] no                    [] yes   Drainage: [x] no                    [] yes   Incision: [x] healing well     []healed well   Motor exam intact: [] no                    [x] yes   Neurovascular exam intact: [] no                    [x] yes   Signs of compartment syndrome: [x] no                    [] yes   Signs of DVT: [x] no                    [] yes   Other:      Physical Exam  Ortho Exam  "   Extremity DVT signs are negative on physical exam with negative Rebekah sign, no calf pain, no palpable cords and no skin tone change  Neurologic Exam    Assessment & Plan     Independent Review of Radiographic Studies:    No new imaging done today.    Laboratory and Other Studies:  No new results reviewed today.     Medical Decision Making:    Stable neurovascular exam.     Procedures     Diagnoses and all orders for this visit:    1. S/P TKR (total knee replacement) using cement, right (Primary)  -     Ambulatory Referral to Physical Therapy POST OP  -     orphenadrine (NORFLEX) 100 MG 12 hr tablet; Take 1 tablet by mouth 2 (Two) Times a Day As Needed for Muscle Spasms.  Dispense: 20 tablet; Refill: 0         Recommendations/Plan:     Sutures Staples or Pins [x] Removed today  [] At prior visit  [] Plan removal later   Physical therapy: []rehab facility  []outpatient referral  [] therapy ongoing   Ultrasound: [x]not ordered         []order given to patient   Labs: [x]not ordered         []order given to patient   Weight Bearing status: []Full [x]WBAT []PWB []NWB []Other     Discussion of orthopaedic goals and activities and patient and/or guardian expressed appreciation.  Regular exercise as tolerated  Guided on proper techniques for mobility, strength, agility and/or conditioning exercises  Weight bearing parameters reviewed  Take prescribed medications as instructed only as tolerated    Continue PT   Follow up in 6 weeks  Will send the norflex in for discomfort and to help him sleep.  Patient is encouraged and agreeable to call or return sooner for any issues or concerns.

## 2023-01-17 ENCOUNTER — OFFICE VISIT (OUTPATIENT)
Dept: ORTHOPEDIC SURGERY | Facility: CLINIC | Age: 58
End: 2023-01-17
Payer: COMMERCIAL

## 2023-01-17 VITALS — BODY MASS INDEX: 32.79 KG/M2 | TEMPERATURE: 98.2 F | WEIGHT: 234.2 LBS | HEIGHT: 71 IN

## 2023-01-17 DIAGNOSIS — Z96.651 S/P TKR (TOTAL KNEE REPLACEMENT) USING CEMENT, RIGHT: Primary | ICD-10-CM

## 2023-01-17 PROCEDURE — 99024 POSTOP FOLLOW-UP VISIT: CPT | Performed by: PHYSICIAN ASSISTANT

## 2023-01-17 NOTE — PROGRESS NOTES
"Subjective   Patient ID: Benjie Mcgovern is a 57 y.o. right hand dominant male  Post-op of the Right Knee (S/P right total knee replacement on 11/22/22. He is still in outpatient PT @ [facility], three times weekly. He is wondering if he still needs to go. )         History of Present Illness  Patient is doing well s/p Right TKA.   He is still in outpatient PT. He is very pleased with his progress. NO pain.                                                    Past Medical History:   Diagnosis Date   • Arthritis     Left knee   • Atrial fibrillation (HCC) 2003    REPORTS WAS DIAGNOSED IN 2003 AND THAT HE HAS HAD NO EPISODES SINCE THAT TIME   • Cataract     unsure which eye   • DVT (deep venous thrombosis) (HCC) 2020    following left total knee   • ED (erectile dysfunction) of non-organic origin    • Elevated cholesterol     REPORTS WAS TOLD \"BORDERLINE\" BUT THAT HE HAS NEVER TAKEN MEDICATION   • Foot pain    • History of exercise stress test 2003    REPORTS WAS TOLD THAT ALL WAS WNL'S   • History of fracture     HISTORY OF LEFT WRIST AS A CHILD - HAD SURGICAL INTERVENTION   • History of kidney stones     REPORTS MULTIPLE EPISODES AND THAT HE HAS REQUIRED SURGICAL INTERVENTION IN THE PAST   • Hyperlipidemia    • Hypertension    • Impaired functional mobility, balance, gait, and endurance    • PONV (postoperative nausea and vomiting)     after getting home on day of discharge   • Wears contact lenses     INSTRUCTED TO LEAVE CONTACTS OUT THE DOS        Past Surgical History:   Procedure Laterality Date   • COLONOSCOPY N/A 08/07/2017    Procedure: COLONOSCOPY WITH BXS, POLYPECTOMY;  Surgeon: Fermín Hamilton MD;  Location: [facility] ENDOSCOPY;  Service:    • FACIAL RECONSTRUCTION SURGERY      FROM DOG BITE AS A CHILD   • FRACTURE SURGERY Left     WRIST AS  A CHILD   • KIDNEY STONE SURGERY     • KNEE ARTHROSCOPY Left 09/27/2018    Procedure: Knee diagnostic arthroscopy, left with partial medial " menisectomy, removal of loose bodies and two compartment chondroplasty;  Surgeon: Dean Garcia MD;  Location: [facility] OR;  Service: Orthopedics   • KNEE SURGERY Right 2015    ATS PMM   • TOTAL KNEE ARTHROPLASTY Left 03/03/2020    Procedure: total knee arthroplasty, Left;  Surgeon: Dean Garcia MD;  Location: [facility] OR;  Service: Orthopedics;  Laterality: Left;   • TOTAL KNEE ARTHROPLASTY Right 11/22/2022    Procedure: Right total knee arthroplasty;  Surgeon: Dean Garcia MD;  Location: [facility] OR;  Service: Orthopedics;  Laterality: Right;   • VASECTOMY         Family History   Problem Relation Age of Onset   • Other Other         brain tumor, renal disease   • Cancer Other    • Stroke Other    • Colon cancer Neg Hx        Social History     Socioeconomic History   • Marital status:    Tobacco Use   • Smoking status: Never   • Smokeless tobacco: Never   Vaping Use   • Vaping Use: Never used   Substance and Sexual Activity   • Alcohol use: No   • Drug use: No   • Sexual activity: Defer         Current Outpatient Medications:   •  apixaban (ELIQUIS) 2.5 MG tablet tablet, Take 1 tablet by mouth 2 (Two) Times a Day for 35 days, Disp: 70 tablet, Rfl: 0  •  dilTIAZem CD (CARDIZEM CD) 120 MG 24 hr capsule, Take 1 capsule by mouth Every Night., Disp: 90 capsule, Rfl: 2  •  docusate sodium (COLACE) 100 MG capsule, Take 1 capsule by mouth 2 (Two) Times a Day., Disp: 20 capsule, Rfl: 0  •  HYDROcodone-acetaminophen (Norco) 7.5-325 MG per tablet, Take 1 tablet by mouth Every 6 (Six) Hours As Needed for pain, Disp: 28 tablet, Rfl: 0  •  losartan (COZAAR) 50 MG tablet, Take 1 tablet by mouth Daily., Disp: 90 tablet, Rfl: 2  •  orphenadrine (NORFLEX) 100 MG 12 hr tablet, Take 1 tablet by mouth 2 (Two) Times a Day As Needed for Muscle Spasms., Disp: 20 tablet, Rfl: 0    No Known Allergies    Review of Systems   Constitutional: Negative for fever.   HENT: Negative for dental problem and voice change.   "  Eyes: Negative for visual disturbance.   Respiratory: Negative for shortness of breath.    Cardiovascular: Negative for chest pain.   Gastrointestinal: Negative for abdominal pain.   Genitourinary: Negative for dysuria.   Musculoskeletal: Positive for arthralgias. Negative for gait problem and joint swelling.   Skin: Negative for rash.   Neurological: Negative for speech difficulty.   Hematological: Does not bruise/bleed easily.   Psychiatric/Behavioral: Negative for confusion.       I have reviewed the medical and surgical history, family history, social history, medications, and/or allergies, and the review of systems of this report.    Objective   Temp 98.2 °F (36.8 °C)   Ht 181.3 cm (71.37\")   Wt 106 kg (234 lb 3.2 oz)   BMI 32.33 kg/m²    Physical Exam  Vitals and nursing note reviewed.   Constitutional:       Appearance: Normal appearance.   Cardiovascular:      Pulses: Normal pulses.   Neurological:      Mental Status: He is alert and oriented to person, place, and time.       Right Knee Exam     Tenderness   The patient is experiencing no tenderness.     Range of Motion   Right knee extension: 6.   Right knee flexion: 119.     Other   Erythema: absent  Scars: present  Sensation: normal           Extremity DVT signs are negative on physical exam with negative Rebekah sign, no calf pain, no palpable cords and no skin tone change   Neurologic Exam     Mental Status   Oriented to person, place, and time.              Assessment & Plan   Independent Review of Radiographic Studies:    No new imaging done today.      Procedures       Diagnoses and all orders for this visit:    1. S/P TKR (total knee replacement) using cement, right (Primary)       Orthopedic activities reviewed and patient expressed appreciation  Discussion of orthopedic goals  Risk, benefits, and merits of treatment alternatives reviewed with the patient and questions answered    Recommendations/Plan:  Exercise, medications, injections, other " patient advice, and return appointment as noted.  Patient is encouraged to call or return for any issues or concerns.  He would like to RTW March 17th 2023. He feels comfortable doing so.    Follow up 5/2023    Patient agreeable to call or return sooner for any concerns.        EMR Dragon-transcription disclaimer:  This encounter note is an electronic transcription of spoken language to printed text.  Electronic transcription of spoken language may permit erroneous or at times nonsensical words or phrases to be inadvertently transcribed.  Although I have reviewed the note for such errors, some may still exist

## 2023-01-17 NOTE — LETTER
January 17, 2023     Patient: Benjie Mcgovern   YOB: 1965   Date of Visit: 1/17/2023       To Whom It May Concern:    It is my medical opinion that Benjie Mcgovern may return to work on 3/17/2023.           Sincerely,        Cecil Tellez PA-C

## 2023-01-19 ENCOUNTER — OFFICE VISIT (OUTPATIENT)
Dept: INTERNAL MEDICINE | Facility: CLINIC | Age: 58
End: 2023-01-19
Payer: COMMERCIAL

## 2023-01-19 VITALS
SYSTOLIC BLOOD PRESSURE: 130 MMHG | HEART RATE: 80 BPM | DIASTOLIC BLOOD PRESSURE: 84 MMHG | HEIGHT: 71 IN | RESPIRATION RATE: 16 BRPM | WEIGHT: 233 LBS | TEMPERATURE: 97.8 F | OXYGEN SATURATION: 97 % | BODY MASS INDEX: 32.62 KG/M2

## 2023-01-19 DIAGNOSIS — E78.2 MIXED HYPERLIPIDEMIA: ICD-10-CM

## 2023-01-19 DIAGNOSIS — R73.01 IMPAIRED FASTING GLUCOSE: ICD-10-CM

## 2023-01-19 DIAGNOSIS — M25.561 PAIN IN JOINT OF RIGHT KNEE: ICD-10-CM

## 2023-01-19 DIAGNOSIS — I10 BENIGN ESSENTIAL HYPERTENSION: Primary | ICD-10-CM

## 2023-01-19 PROCEDURE — 99214 OFFICE O/P EST MOD 30 MIN: CPT | Performed by: INTERNAL MEDICINE

## 2023-01-19 RX ORDER — DILTIAZEM HYDROCHLORIDE 120 MG/1
120 CAPSULE, COATED, EXTENDED RELEASE ORAL NIGHTLY
Qty: 90 CAPSULE | Refills: 2 | Status: SHIPPED | OUTPATIENT
Start: 2023-01-19

## 2023-01-19 RX ORDER — LOSARTAN POTASSIUM 50 MG/1
50 TABLET ORAL DAILY
Qty: 90 TABLET | Refills: 2 | Status: SHIPPED | OUTPATIENT
Start: 2023-01-19

## 2023-01-19 RX ORDER — ASPIRIN 81 MG/1
81 TABLET ORAL DAILY
COMMUNITY

## 2023-01-19 NOTE — PROGRESS NOTES
"Chief Complaint  Hypertension and Hyperlipidemia    Subjective        Benjie Mcgovern presents to [facility]  History of Present Illness  HPI: Patient is here to follow up on the blood pressure  The patient is taking the blood pressure medications as prescribed and has had no side effects. The patient is also here to follow up on the cholesterol and is trying to follow a diet. The patient   is  due to get lab work done .  The patient also needs refills on medications .  He had right knee surgery and is undergoing PT  And follows with orthopedist   Hyperlipidemia   Pertinent negatives include no chest pain or shortness of breath.   Hypertension   Pertinent negatives include no chest pain, palpitations or shortness of breath.    Objective   Vital Signs:  /84   Pulse 80   Temp 97.8 °F (36.6 °C)   Resp 16   Ht 181.3 cm (71.38\")   Wt 106 kg (233 lb)   SpO2 97%   BMI 32.15 kg/m²   Estimated body mass index is 32.15 kg/m² as calculated from the following:    Height as of this encounter: 181.3 cm (71.38\").    Weight as of this encounter: 106 kg (233 lb).       BMI is >= 30 and <35. (Class 1 Obesity). The following options were offered after discussion;: weight loss educational material (shared in after visit summary), exercise counseling/recommendations and nutrition counseling/recommendations      Physical Exam  Vitals and nursing note reviewed.   Constitutional:       General: He is not in acute distress.     Appearance: Normal appearance. He is not diaphoretic.   HENT:      Head: Normocephalic and atraumatic.      Right Ear: External ear normal.      Left Ear: External ear normal.      Nose: Nose normal.   Eyes:      Extraocular Movements: Extraocular movements intact.      Conjunctiva/sclera: Conjunctivae normal.   Neck:      Trachea: Trachea normal.   Cardiovascular:      Rate and Rhythm: Normal rate and regular rhythm.      Heart sounds: Normal heart sounds.   Pulmonary:      " Effort: Pulmonary effort is normal. No respiratory distress.   Abdominal:      General: Abdomen is flat.   Musculoskeletal:      Cervical back: Neck supple.      Comments: Moves all limbs   Skin:     General: Skin is warm and dry.      Findings: No erythema.      Comments:  Right knee incision dry and clean   Neurological:      Mental Status: He is alert and oriented to person, place, and time.      Comments: No gross motor or sensory deficits        Result Review :    Common labs    Common Labs 7/19/22 7/19/22 7/19/22 7/19/22 11/15/22 11/15/22 11/15/22 11/23/22 11/23/22    0942 0942 0942 0942 1158 1158 1158 0534 0534   Glucose  95     92 137 (A)    BUN  15     16 18    Creatinine  0.87     0.74 (A) 0.80    Sodium  139     136 137    Potassium  4.5     4.1 4.4    Chloride  106     102 100    Calcium  9.3     9.4 8.7    Total Protein  7.1          Albumin  4.10     4.50     Total Bilirubin  0.3     0.6     Alkaline Phosphatase  130 (A)     131 (A)     AST (SGOT)  19     19     ALT (SGPT)  28     19     WBC 9.07    9.77    19.88 (A)   Hemoglobin 16.2    16.4    13.1   Hematocrit 47.6    48.5    38.3   Platelets 232    267    255   Total Cholesterol   181         Triglycerides   156 (A)         HDL Cholesterol   39 (A)         LDL Cholesterol    114 (A)         Hemoglobin A1C    5.70 (A)  5.40      (A) Abnormal value       Comments are available for some flowsheets but are not being displayed.               Admission (Discharged) with Dean Garcia MD (11/22/2022)           Assessment and Plan   Diagnoses and all orders for this visit:    1. Benign essential hypertension (Primary)  -     dilTIAZem CD (CARDIZEM CD) 120 MG 24 hr capsule; Take 1 capsule by mouth Every Night.  Dispense: 90 capsule; Refill: 2  -     losartan (COZAAR) 50 MG tablet; Take 1 tablet by mouth Daily.  Dispense: 90 tablet; Refill: 2    2. Mixed hyperlipidemia  -     CBC & Differential  -     Comprehensive Metabolic Panel  -     Lipid  Panel    3. Impaired fasting glucose  -     Hemoglobin A1c    4. Pain in joint of right knee    Plan:  1.  Benign essential hypertension: Will continue current medication, low-sodium diet advised, Counseled to regularly check BP at home with goal averaging <130/80.   2.mixed hyperlipidemia: will obtain   fasting CMP and lipid panel.  Diet and exercise counseled,     3.  Impaired glucose: will obtain   fasting CMP  and hba1c  , diet and exercise counseled ,    4. Right knee pain : s/p tka , follow-up with orthopedist, continue physical therapy     I spent 30 minutes caring for Benjie on this date of service. This time includes time spent by me in the following activities:preparing for the visit, reviewing tests, performing a medically appropriate examination and/or evaluation , counseling and educating the patient/family/caregiver, ordering medications, tests, or procedures and documenting information in the medical record  Follow Up   Return in about 19 weeks (around 6/1/2023).  Patient was given instructions and counseling regarding his condition or for health maintenance advice. Please see specific information pulled into the AVS if appropriate.

## 2023-01-20 LAB
ALBUMIN SERPL-MCNC: 4.8 G/DL (ref 3.5–5.2)
ALBUMIN/GLOB SERPL: 1.9 G/DL
ALP SERPL-CCNC: 155 U/L (ref 39–117)
ALT SERPL-CCNC: 17 U/L (ref 1–41)
AST SERPL-CCNC: 17 U/L (ref 1–40)
BASOPHILS # BLD AUTO: 0.07 10*3/MM3 (ref 0–0.2)
BASOPHILS NFR BLD AUTO: 0.7 % (ref 0–1.5)
BILIRUB SERPL-MCNC: 0.4 MG/DL (ref 0–1.2)
BUN SERPL-MCNC: 16 MG/DL (ref 6–20)
BUN/CREAT SERPL: 18.6 (ref 7–25)
CALCIUM SERPL-MCNC: 9.9 MG/DL (ref 8.6–10.5)
CHLORIDE SERPL-SCNC: 103 MMOL/L (ref 98–107)
CHOLEST SERPL-MCNC: 168 MG/DL (ref 0–200)
CO2 SERPL-SCNC: 26.8 MMOL/L (ref 22–29)
CREAT SERPL-MCNC: 0.86 MG/DL (ref 0.76–1.27)
EGFRCR SERPLBLD CKD-EPI 2021: 101.6 ML/MIN/1.73
EOSINOPHIL # BLD AUTO: 0.13 10*3/MM3 (ref 0–0.4)
EOSINOPHIL NFR BLD AUTO: 1.2 % (ref 0.3–6.2)
ERYTHROCYTE [DISTWIDTH] IN BLOOD BY AUTOMATED COUNT: 11.9 % (ref 12.3–15.4)
GLOBULIN SER CALC-MCNC: 2.5 GM/DL
GLUCOSE SERPL-MCNC: 98 MG/DL (ref 65–99)
HBA1C MFR BLD: 5.4 % (ref 4.8–5.6)
HCT VFR BLD AUTO: 45.9 % (ref 37.5–51)
HDLC SERPL-MCNC: 36 MG/DL (ref 40–60)
HGB BLD-MCNC: 15.5 G/DL (ref 13–17.7)
IMM GRANULOCYTES # BLD AUTO: 0.03 10*3/MM3 (ref 0–0.05)
IMM GRANULOCYTES NFR BLD AUTO: 0.3 % (ref 0–0.5)
LDLC SERPL CALC-MCNC: 99 MG/DL (ref 0–100)
LYMPHOCYTES # BLD AUTO: 2.51 10*3/MM3 (ref 0.7–3.1)
LYMPHOCYTES NFR BLD AUTO: 23.5 % (ref 19.6–45.3)
MCH RBC QN AUTO: 29.6 PG (ref 26.6–33)
MCHC RBC AUTO-ENTMCNC: 33.8 G/DL (ref 31.5–35.7)
MCV RBC AUTO: 87.8 FL (ref 79–97)
MONOCYTES # BLD AUTO: 0.95 10*3/MM3 (ref 0.1–0.9)
MONOCYTES NFR BLD AUTO: 8.9 % (ref 5–12)
NEUTROPHILS # BLD AUTO: 6.97 10*3/MM3 (ref 1.7–7)
NEUTROPHILS NFR BLD AUTO: 65.4 % (ref 42.7–76)
NRBC BLD AUTO-RTO: 0 /100 WBC (ref 0–0.2)
PLATELET # BLD AUTO: 304 10*3/MM3 (ref 140–450)
POTASSIUM SERPL-SCNC: 4.3 MMOL/L (ref 3.5–5.2)
PROT SERPL-MCNC: 7.3 G/DL (ref 6–8.5)
RBC # BLD AUTO: 5.23 10*6/MM3 (ref 4.14–5.8)
SODIUM SERPL-SCNC: 141 MMOL/L (ref 136–145)
TRIGL SERPL-MCNC: 193 MG/DL (ref 0–150)
VLDLC SERPL CALC-MCNC: 33 MG/DL (ref 5–40)
WBC # BLD AUTO: 10.66 10*3/MM3 (ref 3.4–10.8)

## 2023-06-05 ENCOUNTER — OFFICE VISIT (OUTPATIENT)
Dept: INTERNAL MEDICINE | Facility: CLINIC | Age: 58
End: 2023-06-05
Payer: COMMERCIAL

## 2023-06-05 VITALS
HEIGHT: 71 IN | OXYGEN SATURATION: 98 % | TEMPERATURE: 97.8 F | WEIGHT: 237 LBS | HEART RATE: 72 BPM | BODY MASS INDEX: 33.18 KG/M2 | RESPIRATION RATE: 17 BRPM | DIASTOLIC BLOOD PRESSURE: 86 MMHG | SYSTOLIC BLOOD PRESSURE: 144 MMHG

## 2023-06-05 DIAGNOSIS — I10 BENIGN ESSENTIAL HYPERTENSION: Primary | ICD-10-CM

## 2023-06-05 DIAGNOSIS — R73.01 IMPAIRED FASTING GLUCOSE: ICD-10-CM

## 2023-06-05 DIAGNOSIS — R25.1 TREMOR: ICD-10-CM

## 2023-06-05 DIAGNOSIS — E78.2 MIXED HYPERLIPIDEMIA: ICD-10-CM

## 2023-06-05 PROCEDURE — 99214 OFFICE O/P EST MOD 30 MIN: CPT | Performed by: INTERNAL MEDICINE

## 2023-06-05 RX ORDER — DILTIAZEM HYDROCHLORIDE 120 MG/1
120 CAPSULE, COATED, EXTENDED RELEASE ORAL NIGHTLY
Qty: 90 CAPSULE | Refills: 2 | Status: SHIPPED | OUTPATIENT
Start: 2023-06-05

## 2023-06-05 RX ORDER — LOSARTAN POTASSIUM 50 MG/1
50 TABLET ORAL DAILY
Qty: 90 TABLET | Refills: 2 | Status: SHIPPED | OUTPATIENT
Start: 2023-06-05

## 2023-06-05 NOTE — PROGRESS NOTES
"Chief Complaint  Hypertension (Follow up) and Hyperlipidemia    Subjective        Benjie Mcgovern presents to [facility]  History of Present Illness  HPI: Patient is here to follow up on the blood pressure  The patient is taking the blood pressure medications as prescribed and has had no side effects. The patient is also here to follow up on the cholesterol and is trying to follow a diet and is  due to get lab work done .  The patient also needs refills on medications . He complains of tremors intermittent since the past 7 months   Hyperlipidemia   Pertinent negatives include no chest pain or shortness of breath.   Hypertension   Pertinent negatives include no chest pain, palpitations or shortness of breath.    Objective   Vital Signs:  /86   Pulse 72   Temp 97.8 °F (36.6 °C)   Resp 17   Ht 181.1 cm (71.3\")   Wt 108 kg (237 lb)   SpO2 98%   BMI 32.78 kg/m²   Estimated body mass index is 32.78 kg/m² as calculated from the following:    Height as of this encounter: 181.1 cm (71.3\").    Weight as of this encounter: 108 kg (237 lb).       BMI is >= 30 and <35. (Class 1 Obesity). The following options were offered after discussion;: weight loss educational material (shared in after visit summary), exercise counseling/recommendations, and nutrition counseling/recommendations      Physical Exam  Vitals and nursing note reviewed.   Constitutional:       General: He is not in acute distress.     Appearance: Normal appearance. He is not diaphoretic.   HENT:      Head: Normocephalic and atraumatic.      Right Ear: External ear normal.      Left Ear: External ear normal.      Nose: Nose normal.   Eyes:      Extraocular Movements: Extraocular movements intact.      Conjunctiva/sclera: Conjunctivae normal.   Neck:      Trachea: Trachea normal.   Cardiovascular:      Rate and Rhythm: Normal rate and regular rhythm.      Heart sounds: Normal heart sounds.   Pulmonary:      Effort: " Pulmonary effort is normal. No respiratory distress.   Abdominal:      General: Abdomen is flat.   Musculoskeletal:      Cervical back: Neck supple.      Comments: Moves all limbs   Skin:     General: Skin is warm and dry.      Findings: No erythema.   Neurological:      Mental Status: He is alert and oriented to person, place, and time.      Comments: No gross motor or sensory deficits      Result Review :  The following data was reviewed by: Evie Martin MD on 06/05/2023:  Common labs          11/15/2022    11:58 11/23/2022    05:34 1/19/2023    09:14   Common Labs   Glucose 92  137  98    BUN 16  18  16    Creatinine 0.74  0.80  0.86    Sodium 136  137  141    Potassium 4.1  4.4  4.3    Chloride 102  100  103    Calcium 9.4  8.7  9.9    Total Protein   7.3    Albumin 4.50   4.8    Total Bilirubin 0.6   0.4    Alkaline Phosphatase 131   155    AST (SGOT) 19   17    ALT (SGPT) 19   17    WBC 9.77  19.88  10.66    Hemoglobin 16.4  13.1  15.5    Hematocrit 48.5  38.3  45.9    Platelets 267  255  304    Total Cholesterol   168    Triglycerides   193    HDL Cholesterol   36    LDL Cholesterol    99    Hemoglobin A1C 5.40   5.40                   Assessment and Plan   Diagnoses and all orders for this visit:    1. Benign essential hypertension (Primary)  -     dilTIAZem CD (CARDIZEM CD) 120 MG 24 hr capsule; Take 1 capsule by mouth Every Night.  Dispense: 90 capsule; Refill: 2  -     losartan (COZAAR) 50 MG tablet; Take 1 tablet by mouth Daily.  Dispense: 90 tablet; Refill: 2    2. Mixed hyperlipidemia  -     CBC & Differential  -     Comprehensive Metabolic Panel  -     Lipid Panel    3. Impaired fasting glucose  -     Hemoglobin A1c    4. Tremor  -     Ambulatory Referral to Neurology        Plan:  1.  Benign essential hypertension: Will continue current medication, low-sodium diet advised, Counseled to regularly check BP at home with goal averaging <130/80.   2.mixed hyperlipidemia:  obtain   fasting CMP and lipid  panel.  Diet and exercise counseled,     3. impaired glucose   :   obtain   fasting CMP  and hba1c  , diet and exercise counseled  4.  Tremors: refer to neurology       I spent 30 minutes caring for Benjie on this date of service. This time includes time spent by me in the following activities:preparing for the visit, reviewing tests, performing a medically appropriate examination and/or evaluation , counseling and educating the patient/family/caregiver, ordering medications, tests, or procedures, and documenting information in the medical record  Follow Up   Return in about 15 weeks (around 9/18/2023).  Patient was given instructions and counseling regarding his condition or for health maintenance advice. Please see specific information pulled into the AVS if appropriate.

## 2023-06-06 LAB
ALBUMIN SERPL-MCNC: 4.5 G/DL (ref 3.5–5.2)
ALBUMIN/GLOB SERPL: 2 G/DL
ALP SERPL-CCNC: 131 U/L (ref 39–117)
ALT SERPL-CCNC: 18 U/L (ref 1–41)
AST SERPL-CCNC: 15 U/L (ref 1–40)
BASOPHILS # BLD AUTO: 0.07 10*3/MM3 (ref 0–0.2)
BASOPHILS NFR BLD AUTO: 0.8 % (ref 0–1.5)
BILIRUB SERPL-MCNC: 0.2 MG/DL (ref 0–1.2)
BUN SERPL-MCNC: 18 MG/DL (ref 6–20)
BUN/CREAT SERPL: 20.2 (ref 7–25)
CALCIUM SERPL-MCNC: 9.5 MG/DL (ref 8.6–10.5)
CHLORIDE SERPL-SCNC: 106 MMOL/L (ref 98–107)
CHOLEST SERPL-MCNC: 171 MG/DL (ref 0–200)
CO2 SERPL-SCNC: 24.2 MMOL/L (ref 22–29)
CREAT SERPL-MCNC: 0.89 MG/DL (ref 0.76–1.27)
EGFRCR SERPLBLD CKD-EPI 2021: 100.6 ML/MIN/1.73
EOSINOPHIL # BLD AUTO: 0.15 10*3/MM3 (ref 0–0.4)
EOSINOPHIL NFR BLD AUTO: 1.7 % (ref 0.3–6.2)
ERYTHROCYTE [DISTWIDTH] IN BLOOD BY AUTOMATED COUNT: 12.9 % (ref 12.3–15.4)
GLOBULIN SER CALC-MCNC: 2.3 GM/DL
GLUCOSE SERPL-MCNC: 101 MG/DL (ref 65–99)
HBA1C MFR BLD: 5.6 % (ref 4.8–5.6)
HCT VFR BLD AUTO: 46.9 % (ref 37.5–51)
HDLC SERPL-MCNC: 37 MG/DL (ref 40–60)
HGB BLD-MCNC: 16.1 G/DL (ref 13–17.7)
IMM GRANULOCYTES # BLD AUTO: 0.04 10*3/MM3 (ref 0–0.05)
IMM GRANULOCYTES NFR BLD AUTO: 0.5 % (ref 0–0.5)
LDLC SERPL CALC-MCNC: 114 MG/DL (ref 0–100)
LYMPHOCYTES # BLD AUTO: 2.28 10*3/MM3 (ref 0.7–3.1)
LYMPHOCYTES NFR BLD AUTO: 25.9 % (ref 19.6–45.3)
MCH RBC QN AUTO: 30.6 PG (ref 26.6–33)
MCHC RBC AUTO-ENTMCNC: 34.3 G/DL (ref 31.5–35.7)
MCV RBC AUTO: 89.2 FL (ref 79–97)
MONOCYTES # BLD AUTO: 0.77 10*3/MM3 (ref 0.1–0.9)
MONOCYTES NFR BLD AUTO: 8.7 % (ref 5–12)
NEUTROPHILS # BLD AUTO: 5.51 10*3/MM3 (ref 1.7–7)
NEUTROPHILS NFR BLD AUTO: 62.4 % (ref 42.7–76)
NRBC BLD AUTO-RTO: 0 /100 WBC (ref 0–0.2)
PLATELET # BLD AUTO: 252 10*3/MM3 (ref 140–450)
POTASSIUM SERPL-SCNC: 4.4 MMOL/L (ref 3.5–5.2)
PROT SERPL-MCNC: 6.8 G/DL (ref 6–8.5)
RBC # BLD AUTO: 5.26 10*6/MM3 (ref 4.14–5.8)
SODIUM SERPL-SCNC: 140 MMOL/L (ref 136–145)
TRIGL SERPL-MCNC: 110 MG/DL (ref 0–150)
VLDLC SERPL CALC-MCNC: 20 MG/DL (ref 5–40)
WBC # BLD AUTO: 8.82 10*3/MM3 (ref 3.4–10.8)

## 2023-09-19 ENCOUNTER — TELEPHONE (OUTPATIENT)
Dept: INTERNAL MEDICINE | Facility: CLINIC | Age: 58
End: 2023-09-19

## 2023-09-19 NOTE — TELEPHONE ENCOUNTER
Caller: Benjie Mcgovern    Relationship to patient: Self    Best call back number: [phone]    Chief complaint: 15 WEEK F/U    Type of visit: OFFICE VISIT    Requested date: NOVEMBER 2023    If rescheduling, when is the original appointment: 09/21/23    Additional notes: PATIENT STATED THAT HE HAS A WORK CONFLICT AND NOT ABLE TO GET OFF FOR APPOINTMENT THAT WAS ON 09/21/23 BUT WANTED TO SEE IF PCP WOULD HAVE ANYTHING THE FIRST WEEK OF NOVEMBER    PLEASE ADVISE

## 2024-03-07 ENCOUNTER — OFFICE VISIT (OUTPATIENT)
Dept: INTERNAL MEDICINE | Facility: CLINIC | Age: 59
End: 2024-03-07
Payer: COMMERCIAL

## 2024-03-07 VITALS
OXYGEN SATURATION: 98 % | RESPIRATION RATE: 18 BRPM | DIASTOLIC BLOOD PRESSURE: 87 MMHG | WEIGHT: 244 LBS | SYSTOLIC BLOOD PRESSURE: 138 MMHG | TEMPERATURE: 97.8 F | BODY MASS INDEX: 34.16 KG/M2 | HEART RATE: 64 BPM | HEIGHT: 71 IN

## 2024-03-07 DIAGNOSIS — R73.01 IMPAIRED FASTING GLUCOSE: ICD-10-CM

## 2024-03-07 DIAGNOSIS — I10 BENIGN ESSENTIAL HYPERTENSION: Primary | ICD-10-CM

## 2024-03-07 DIAGNOSIS — I48.20 CHRONIC ATRIAL FIBRILLATION: ICD-10-CM

## 2024-03-07 DIAGNOSIS — E78.2 MIXED HYPERLIPIDEMIA: ICD-10-CM

## 2024-03-07 LAB
ALBUMIN SERPL-MCNC: 4.5 G/DL (ref 3.5–5.2)
ALBUMIN/GLOB SERPL: 2 G/DL
ALP SERPL-CCNC: 125 U/L (ref 39–117)
ALT SERPL-CCNC: 20 U/L (ref 1–41)
AST SERPL-CCNC: 20 U/L (ref 1–40)
BILIRUB SERPL-MCNC: 0.6 MG/DL (ref 0–1.2)
BUN SERPL-MCNC: 18 MG/DL (ref 6–20)
BUN/CREAT SERPL: 22.5 (ref 7–25)
CALCIUM SERPL-MCNC: 9.1 MG/DL (ref 8.6–10.5)
CHLORIDE SERPL-SCNC: 104 MMOL/L (ref 98–107)
CHOLEST SERPL-MCNC: 166 MG/DL (ref 0–200)
CO2 SERPL-SCNC: 21.7 MMOL/L (ref 22–29)
CREAT SERPL-MCNC: 0.8 MG/DL (ref 0.76–1.27)
EGFRCR SERPLBLD CKD-EPI 2021: 103.2 ML/MIN/1.73
ERYTHROCYTE [DISTWIDTH] IN BLOOD BY AUTOMATED COUNT: 12.3 % (ref 12.3–15.4)
GLOBULIN SER CALC-MCNC: 2.3 GM/DL
GLUCOSE SERPL-MCNC: 94 MG/DL (ref 65–99)
HBA1C MFR BLD: 5.7 % (ref 4.8–5.6)
HCT VFR BLD AUTO: 47.8 % (ref 37.5–51)
HDLC SERPL-MCNC: 39 MG/DL (ref 40–60)
HGB BLD-MCNC: 15.9 G/DL (ref 13–17.7)
LDLC SERPL CALC-MCNC: 105 MG/DL (ref 0–100)
MCH RBC QN AUTO: 29.3 PG (ref 26.6–33)
MCHC RBC AUTO-ENTMCNC: 33.3 G/DL (ref 31.5–35.7)
MCV RBC AUTO: 88.2 FL (ref 79–97)
PLATELET # BLD AUTO: 253 10*3/MM3 (ref 140–450)
POTASSIUM SERPL-SCNC: 4.4 MMOL/L (ref 3.5–5.2)
PROT SERPL-MCNC: 6.8 G/DL (ref 6–8.5)
RBC # BLD AUTO: 5.42 10*6/MM3 (ref 4.14–5.8)
SODIUM SERPL-SCNC: 136 MMOL/L (ref 136–145)
TRIGL SERPL-MCNC: 123 MG/DL (ref 0–150)
VLDLC SERPL CALC-MCNC: 22 MG/DL (ref 5–40)
WBC # BLD AUTO: 7.74 10*3/MM3 (ref 3.4–10.8)

## 2024-03-07 PROCEDURE — 99214 OFFICE O/P EST MOD 30 MIN: CPT | Performed by: INTERNAL MEDICINE

## 2024-03-07 RX ORDER — DILTIAZEM HYDROCHLORIDE 120 MG/1
120 CAPSULE, COATED, EXTENDED RELEASE ORAL NIGHTLY
Qty: 90 CAPSULE | Refills: 1 | Status: SHIPPED | OUTPATIENT
Start: 2024-03-07

## 2024-03-07 RX ORDER — LOSARTAN POTASSIUM 50 MG/1
50 TABLET ORAL DAILY
Qty: 90 TABLET | Refills: 1 | Status: SHIPPED | OUTPATIENT
Start: 2024-03-07

## 2024-03-07 NOTE — PROGRESS NOTES
"Chief Complaint  Hypertension (Patient is fasting) and Hyperlipidemia    Subjective        Benjie Mcgovern presents to [facility]  HPI: Patient is here to follow up on the blood pressure  The patient is taking the blood pressure medications as prescribed and has had no side effects. The patient is also here to follow up on the cholesterol and is trying to follow a diet  and is  due to get lab work done .  The patient also needs refills on medications . He also complains of atrial fibrillation and is rate controlled , he has seen cardiology in the past   Hyperlipidemia   Pertinent negatives include no chest pain or shortness of breath.   Hypertension   Pertinent negatives include no chest pain, palpitations or shortness of breath.      Objective   Vital Signs:  /87   Pulse 64   Temp 97.8 °F (36.6 °C)   Resp 18   Ht 181.1 cm (71.3\")   Wt 111 kg (244 lb)   SpO2 98%   BMI 33.75 kg/m²   Estimated body mass index is 33.75 kg/m² as calculated from the following:    Height as of this encounter: 181.1 cm (71.3\").    Weight as of this encounter: 111 kg (244 lb).               Physical Exam  Vitals and nursing note reviewed.   Constitutional:       General: He is not in acute distress.     Appearance: Normal appearance. He is obese. He is not diaphoretic.   HENT:      Head: Normocephalic and atraumatic.      Right Ear: External ear normal.      Left Ear: External ear normal.      Nose: Nose normal.   Eyes:      Extraocular Movements: Extraocular movements intact.      Conjunctiva/sclera: Conjunctivae normal.   Neck:      Trachea: Trachea normal.   Cardiovascular:      Rate and Rhythm: Normal rate and regular rhythm.      Heart sounds: Normal heart sounds.   Pulmonary:      Effort: Pulmonary effort is normal. No respiratory distress.   Abdominal:      General: Abdomen is flat.   Musculoskeletal:      Cervical back: Neck supple.      Comments: Moves all limbs   Skin:     General: Skin " is warm and dry.      Findings: No erythema.   Neurological:      Mental Status: He is alert and oriented to person, place, and time.      Comments: No gross motor or sensory deficits        Result Review :    The following data was reviewed by: Evie Martin MD on 03/07/2024:  Common labs          6/5/2023    08:56 3/7/2024    10:18   Common Labs   Glucose 101  94    BUN 18  18    Creatinine 0.89  0.80    Sodium 140  136    Potassium 4.4  4.4    Chloride 106  104    Calcium 9.5  9.1    Total Protein 6.8  6.8    Albumin 4.5  4.5    Total Bilirubin 0.2  0.6    Alkaline Phosphatase 131  125    AST (SGOT) 15  20    ALT (SGPT) 18  20    WBC 8.82  7.74    Hemoglobin 16.1  15.9    Hematocrit 46.9  47.8    Platelets 252  253    Total Cholesterol 171  166    Triglycerides 110  123    HDL Cholesterol 37  39    LDL Cholesterol  114  105    Hemoglobin A1C 5.60  5.70                   Assessment and Plan     Diagnoses and all orders for this visit:    1. Benign essential hypertension (Primary)  -     dilTIAZem CD (CARDIZEM CD) 120 MG 24 hr capsule; Take 1 capsule by mouth Every Night.  Dispense: 90 capsule; Refill: 1  -     losartan (COZAAR) 50 MG tablet; Take 1 tablet by mouth Daily.  Dispense: 90 tablet; Refill: 1    2. Mixed hyperlipidemia  -     CBC (No Diff)  -     Comprehensive Metabolic Panel  -     Lipid Panel    3. Impaired fasting glucose  -     Hemoglobin A1c    4. Chronic atrial fibrillation    Plan:  1.  Benign essential hypertension: Will continue current medication, low-sodium diet advised, Counseled to regularly check BP at home with goal averaging <130/80.   2.mixed hyperlipidemia: obtain fasting CMP and lipid panel.  Diet and exercise counseled,     3. impaired glucose   : obtain   fasting CMP  and hba1c  , diet and exercise counseled   4. Atrial fibrillation: rate controlled,  will continue current medications , per cardiology              Follow Up     Return in about 5 months (around 8/21/2024).  Patient  was given instructions and counseling regarding his condition or for health maintenance advice. Please see specific information pulled into the AVS if appropriate.

## 2024-08-08 ENCOUNTER — TELEPHONE (OUTPATIENT)
Dept: INTERNAL MEDICINE | Facility: CLINIC | Age: 59
End: 2024-08-08

## 2024-08-08 NOTE — TELEPHONE ENCOUNTER
Caller: Benjie Mcgovern A    Relationship to patient: Self    Best call back number: [phone]     Chief complaint:     Type of visit: OFFICE VISIT    Requested date: AUGUST 19-22, SEPTEMBER 23-27     If rescheduling, when is the original appointment: AUGUST 15     Additional notes: DR KNIGHT DOES NOT HAVE ANY APPOINTMENTS UNTIL NOVEMBER.

## 2024-08-19 ENCOUNTER — OFFICE VISIT (OUTPATIENT)
Dept: INTERNAL MEDICINE | Facility: CLINIC | Age: 59
End: 2024-08-19
Payer: COMMERCIAL

## 2024-08-19 VITALS
OXYGEN SATURATION: 98 % | WEIGHT: 237 LBS | BODY MASS INDEX: 33.18 KG/M2 | HEART RATE: 62 BPM | HEIGHT: 71 IN | SYSTOLIC BLOOD PRESSURE: 137 MMHG | RESPIRATION RATE: 20 BRPM | TEMPERATURE: 97.7 F | DIASTOLIC BLOOD PRESSURE: 90 MMHG

## 2024-08-19 DIAGNOSIS — I10 BENIGN ESSENTIAL HYPERTENSION: Primary | ICD-10-CM

## 2024-08-19 DIAGNOSIS — I48.20 CHRONIC ATRIAL FIBRILLATION: ICD-10-CM

## 2024-08-19 DIAGNOSIS — R73.01 IMPAIRED FASTING GLUCOSE: ICD-10-CM

## 2024-08-19 DIAGNOSIS — E78.2 MIXED HYPERLIPIDEMIA: ICD-10-CM

## 2024-08-19 PROCEDURE — 99214 OFFICE O/P EST MOD 30 MIN: CPT | Performed by: INTERNAL MEDICINE

## 2024-08-19 RX ORDER — LOSARTAN POTASSIUM 50 MG/1
50 TABLET ORAL DAILY
Qty: 90 TABLET | Refills: 1 | Status: SHIPPED | OUTPATIENT
Start: 2024-08-19

## 2024-08-19 RX ORDER — DILTIAZEM HYDROCHLORIDE 120 MG/1
120 CAPSULE, COATED, EXTENDED RELEASE ORAL NIGHTLY
Qty: 90 CAPSULE | Refills: 1 | Status: SHIPPED | OUTPATIENT
Start: 2024-08-19

## 2024-08-19 NOTE — PROGRESS NOTES
"Chief Complaint  Hypertension (Follow up visit, patient is fasting) and Hyperlipidemia    Subjective        Benjie Mcgovern presents to [facility]  HPI: Patient is here to follow up on the blood pressure  The patient is taking the blood pressure medications as prescribed and has had no side effects. The patient is also here to follow up on the cholesterol and   is  due to get lab work done .  The patient also needs refills on medications .  He is following up on atrial fibrillation and has seen cardiology  Hyperlipidemia   Pertinent negatives include no chest pain or shortness of breath.   Hypertension   Pertinent negatives include no chest pain, palpitations or shortness of breath.      Objective   Vital Signs:  /90   Pulse 62   Temp 97.7 °F (36.5 °C)   Resp 20   Ht 181.1 cm (71.3\")   Wt 108 kg (237 lb)   SpO2 98%   BMI 32.78 kg/m²   Estimated body mass index is 32.78 kg/m² as calculated from the following:    Height as of this encounter: 181.1 cm (71.3\").    Weight as of this encounter: 108 kg (237 lb).    BMI is >= 30 and <35. (Class 1 Obesity). The following options were offered after discussion;: weight loss educational material (shared in after visit summary), exercise counseling/recommendations, and nutrition counseling/recommendations      Physical Exam  Vitals and nursing note reviewed.   Constitutional:       General: He is not in acute distress.     Appearance: Normal appearance. He is not diaphoretic.   HENT:      Head: Normocephalic and atraumatic.      Right Ear: External ear normal.      Left Ear: External ear normal.      Nose: Nose normal.   Eyes:      Extraocular Movements: Extraocular movements intact.      Conjunctiva/sclera: Conjunctivae normal.   Neck:      Trachea: Trachea normal.   Cardiovascular:      Rate and Rhythm: Normal rate and regular rhythm.      Heart sounds: Normal heart sounds.   Pulmonary:      Effort: Pulmonary effort is normal. No " respiratory distress.   Abdominal:      General: Abdomen is flat.   Musculoskeletal:      Cervical back: Neck supple.      Comments: Moves all limbs   Skin:     General: Skin is warm and dry.      Findings: No erythema.   Neurological:      Mental Status: He is alert and oriented to person, place, and time.      Comments: No gross motor or sensory deficits        Result Review :  The following data was reviewed by: Evie Martin MD on 08/19/2024:  Common labs          3/7/2024    10:18   Common Labs   Glucose 94    BUN 18    Creatinine 0.80    Sodium 136    Potassium 4.4    Chloride 104    Calcium 9.1    Total Protein 6.8    Albumin 4.5    Total Bilirubin 0.6    Alkaline Phosphatase 125    AST (SGOT) 20    ALT (SGPT) 20    WBC 7.74    Hemoglobin 15.9    Hematocrit 47.8    Platelets 253    Total Cholesterol 166    Triglycerides 123    HDL Cholesterol 39    LDL Cholesterol  105    Hemoglobin A1C 5.70                Assessment and Plan   Diagnoses and all orders for this visit:    1. Benign essential hypertension (Primary)  -     dilTIAZem CD (CARDIZEM CD) 120 MG 24 hr capsule; Take 1 capsule by mouth Every Night.  Dispense: 90 capsule; Refill: 1  -     losartan (COZAAR) 50 MG tablet; Take 1 tablet by mouth Daily.  Dispense: 90 tablet; Refill: 1    2. Mixed hyperlipidemia  -     CBC (No Diff)  -     Comprehensive Metabolic Panel  -     Lipid Panel    3. Impaired fasting glucose  -     Hemoglobin A1c    4. Chronic atrial fibrillation    Plan:  1.  Benign essential hypertension: Will continue current medication, low-sodium diet advised, Counseled to regularly check BP at home with goal averaging <130/80.   2.mixed hyperlipidemia:  obtain   fasting CMP and lipid panel.  Diet and exercise counseled,    3. impaired glucose   :  obtain   fasting CMP  and hba1c  5.7 , diet and exercise counseled  4. Atrial fibrillation: rate controlled, will continue current medications , per cardiology , labs  today             Follow Up    Return in about 5 months (around 1/15/2025).  Patient was given instructions and counseling regarding his condition or for health maintenance advice. Please see specific information pulled into the AVS if appropriate.

## 2024-08-20 ENCOUNTER — TELEPHONE (OUTPATIENT)
Dept: INTERNAL MEDICINE | Facility: CLINIC | Age: 59
End: 2024-08-20
Payer: COMMERCIAL

## 2024-08-20 LAB
ALBUMIN SERPL-MCNC: 4.3 G/DL (ref 3.5–5.2)
ALBUMIN/GLOB SERPL: 1.7 G/DL
ALP SERPL-CCNC: 126 U/L (ref 39–117)
ALT SERPL-CCNC: 15 U/L (ref 1–41)
AST SERPL-CCNC: 15 U/L (ref 1–40)
BILIRUB SERPL-MCNC: 0.3 MG/DL (ref 0–1.2)
BUN SERPL-MCNC: 15 MG/DL (ref 6–20)
BUN/CREAT SERPL: 19 (ref 7–25)
CALCIUM SERPL-MCNC: 9.2 MG/DL (ref 8.6–10.5)
CHLORIDE SERPL-SCNC: 104 MMOL/L (ref 98–107)
CHOLEST SERPL-MCNC: 158 MG/DL (ref 0–200)
CO2 SERPL-SCNC: 25.1 MMOL/L (ref 22–29)
CREAT SERPL-MCNC: 0.79 MG/DL (ref 0.76–1.27)
EGFRCR SERPLBLD CKD-EPI 2021: 103 ML/MIN/1.73
ERYTHROCYTE [DISTWIDTH] IN BLOOD BY AUTOMATED COUNT: 12.4 % (ref 12.3–15.4)
GLOBULIN SER CALC-MCNC: 2.5 GM/DL
GLUCOSE SERPL-MCNC: 99 MG/DL (ref 65–99)
HBA1C MFR BLD: 5.8 % (ref 4.8–5.6)
HCT VFR BLD AUTO: 47.7 % (ref 37.5–51)
HDLC SERPL-MCNC: 39 MG/DL (ref 40–60)
HGB BLD-MCNC: 16.2 G/DL (ref 13–17.7)
LDLC SERPL CALC-MCNC: 97 MG/DL (ref 0–100)
MCH RBC QN AUTO: 30.8 PG (ref 26.6–33)
MCHC RBC AUTO-ENTMCNC: 34 G/DL (ref 31.5–35.7)
MCV RBC AUTO: 90.7 FL (ref 79–97)
PLATELET # BLD AUTO: 230 10*3/MM3 (ref 140–450)
POTASSIUM SERPL-SCNC: 4.5 MMOL/L (ref 3.5–5.2)
PROT SERPL-MCNC: 6.8 G/DL (ref 6–8.5)
RBC # BLD AUTO: 5.26 10*6/MM3 (ref 4.14–5.8)
SODIUM SERPL-SCNC: 139 MMOL/L (ref 136–145)
TRIGL SERPL-MCNC: 119 MG/DL (ref 0–150)
VLDLC SERPL CALC-MCNC: 22 MG/DL (ref 5–40)
WBC # BLD AUTO: 7.59 10*3/MM3 (ref 3.4–10.8)

## 2025-02-10 ENCOUNTER — OFFICE VISIT (OUTPATIENT)
Dept: INTERNAL MEDICINE | Facility: CLINIC | Age: 60
End: 2025-02-10
Payer: COMMERCIAL

## 2025-02-10 VITALS
BODY MASS INDEX: 33.15 KG/M2 | HEIGHT: 71 IN | RESPIRATION RATE: 20 BRPM | HEART RATE: 70 BPM | TEMPERATURE: 98 F | OXYGEN SATURATION: 99 % | SYSTOLIC BLOOD PRESSURE: 136 MMHG | DIASTOLIC BLOOD PRESSURE: 80 MMHG | WEIGHT: 236.8 LBS

## 2025-02-10 DIAGNOSIS — Z12.11 COLON CANCER SCREENING: ICD-10-CM

## 2025-02-10 DIAGNOSIS — I10 BENIGN ESSENTIAL HYPERTENSION: Primary | ICD-10-CM

## 2025-02-10 DIAGNOSIS — R73.01 IMPAIRED FASTING GLUCOSE: ICD-10-CM

## 2025-02-10 DIAGNOSIS — E78.2 MIXED HYPERLIPIDEMIA: ICD-10-CM

## 2025-02-10 PROCEDURE — 99214 OFFICE O/P EST MOD 30 MIN: CPT | Performed by: INTERNAL MEDICINE

## 2025-02-10 RX ORDER — DILTIAZEM HYDROCHLORIDE 120 MG/1
120 CAPSULE, COATED, EXTENDED RELEASE ORAL NIGHTLY
Qty: 90 CAPSULE | Refills: 1 | Status: SHIPPED | OUTPATIENT
Start: 2025-02-10

## 2025-02-10 RX ORDER — LOSARTAN POTASSIUM 50 MG/1
50 TABLET ORAL DAILY
Qty: 90 TABLET | Refills: 1 | Status: SHIPPED | OUTPATIENT
Start: 2025-02-10

## 2025-02-10 NOTE — PROGRESS NOTES
"Chief Complaint  Hypertension and Hyperlipidemia    Subjective        Bejnie Mcgovern presents to [facility]  HPI: Patient is here to follow up on the blood pressure  The patient is taking the blood pressure medications as prescribed and has had no side effects. The patient is also here to follow up on the cholesterol and   is  due to get lab work done .  The patient also needs refills on medications .  Patient is due for colonoscopy  Hyperlipidemia   Pertinent negatives include no chest pain or shortness of breath.   Hypertension   Pertinent negatives include no chest pain, palpitations or shortness of breath.      Objective   Vital Signs:  /80   Pulse 70   Temp 98 °F (36.7 °C) (Temporal)   Resp 20   Ht 180.3 cm (71\")   Wt 107 kg (236 lb 12.8 oz)   SpO2 99%   BMI 33.03 kg/m²   Estimated body mass index is 33.03 kg/m² as calculated from the following:    Height as of this encounter: 180.3 cm (71\").    Weight as of this encounter: 107 kg (236 lb 12.8 oz).            Physical Exam  Vitals and nursing note reviewed.   Constitutional:       General: He is not in acute distress.     Appearance: Normal appearance. He is not diaphoretic.   HENT:      Head: Normocephalic and atraumatic.      Right Ear: External ear normal.      Left Ear: External ear normal.      Nose: Nose normal.   Eyes:      Extraocular Movements: Extraocular movements intact.      Conjunctiva/sclera: Conjunctivae normal.   Neck:      Trachea: Trachea normal.   Cardiovascular:      Rate and Rhythm: Normal rate and regular rhythm.      Heart sounds: Normal heart sounds.   Pulmonary:      Effort: Pulmonary effort is normal. No respiratory distress.   Abdominal:      General: Abdomen is flat.   Musculoskeletal:      Cervical back: Neck supple.      Comments: Moves all limbs   Skin:     General: Skin is warm and dry.      Findings: No erythema.   Neurological:      Mental Status: He is alert and oriented to person, " place, and time.      Comments: No gross motor or sensory deficits        Result Review :  The following data was reviewed by: Evie Martin MD on 02/10/2025:  Common labs          3/7/2024    10:18 8/19/2024    09:17   Common Labs   Glucose 94  99    BUN 18  15    Creatinine 0.80  0.79    Sodium 136  139    Potassium 4.4  4.5    Chloride 104  104    Calcium 9.1  9.2    Total Protein 6.8  6.8    Albumin 4.5  4.3    Total Bilirubin 0.6  0.3    Alkaline Phosphatase 125  126    AST (SGOT) 20  15    ALT (SGPT) 20  15    WBC 7.74  7.59    Hemoglobin 15.9  16.2    Hematocrit 47.8  47.7    Platelets 253  230    Total Cholesterol 166  158    Triglycerides 123  119    HDL Cholesterol 39  39    LDL Cholesterol  105  97    Hemoglobin A1C 5.70  5.80                Assessment and Plan   Diagnoses and all orders for this visit:    1. Benign essential hypertension (Primary)  -     losartan (COZAAR) 50 MG tablet; Take 1 tablet by mouth Daily.  Dispense: 90 tablet; Refill: 1  -     dilTIAZem CD (CARDIZEM CD) 120 MG 24 hr capsule; Take 1 capsule by mouth Every Night.  Dispense: 90 capsule; Refill: 1    2. Mixed hyperlipidemia  -     CBC (No Diff)  -     Comprehensive Metabolic Panel  -     Lipid Panel    3. Impaired fasting glucose  -     Hemoglobin A1c    4. Colon cancer screening  -     Ambulatory Referral to Gastroenterology      Plan:  1.  Benign essential hypertension: Will continue current medication, low-sodium diet advised, Counseled to regularly check BP at home with goal averaging <130/80.   2.mixed hyperlipidemia: will obtain   fasting CMP and lipid panel.  Diet and exercise counseled,     3.  impaired glucose: will obtain   fasting CMP  and hba1c 5.8, diet and exercise counseled ,   4.  Colon cancer screening: Will refer patient to GI for colonoscopy       Follow Up   Return in about 23 weeks (around 7/21/2025).  Patient was given instructions and counseling regarding his condition or for health maintenance advice. Please  see specific information pulled into the AVS if appropriate.

## 2025-02-11 LAB
ALBUMIN SERPL-MCNC: 4 G/DL (ref 3.5–5.2)
ALBUMIN/GLOB SERPL: 1.5 G/DL
ALP SERPL-CCNC: 129 U/L (ref 39–117)
ALT SERPL-CCNC: 15 U/L (ref 1–41)
AST SERPL-CCNC: 16 U/L (ref 1–40)
BILIRUB SERPL-MCNC: 0.4 MG/DL (ref 0–1.2)
BUN SERPL-MCNC: 15 MG/DL (ref 6–20)
BUN/CREAT SERPL: 16.9 (ref 7–25)
CALCIUM SERPL-MCNC: 9.5 MG/DL (ref 8.6–10.5)
CHLORIDE SERPL-SCNC: 105 MMOL/L (ref 98–107)
CHOLEST SERPL-MCNC: 162 MG/DL (ref 0–200)
CO2 SERPL-SCNC: 23 MMOL/L (ref 22–29)
CREAT SERPL-MCNC: 0.89 MG/DL (ref 0.76–1.27)
EGFRCR SERPLBLD CKD-EPI 2021: 99.3 ML/MIN/1.73
ERYTHROCYTE [DISTWIDTH] IN BLOOD BY AUTOMATED COUNT: 12.2 % (ref 12.3–15.4)
GLOBULIN SER CALC-MCNC: 2.7 GM/DL
GLUCOSE SERPL-MCNC: 98 MG/DL (ref 65–99)
HBA1C MFR BLD: 5.8 % (ref 4.8–5.6)
HCT VFR BLD AUTO: 48.5 % (ref 37.5–51)
HDLC SERPL-MCNC: 38 MG/DL (ref 40–60)
HGB BLD-MCNC: 16.5 G/DL (ref 13–17.7)
LDLC SERPL CALC-MCNC: 104 MG/DL (ref 0–100)
MCH RBC QN AUTO: 30.7 PG (ref 26.6–33)
MCHC RBC AUTO-ENTMCNC: 34 G/DL (ref 31.5–35.7)
MCV RBC AUTO: 90.3 FL (ref 79–97)
PLATELET # BLD AUTO: 266 10*3/MM3 (ref 140–450)
POTASSIUM SERPL-SCNC: 4.5 MMOL/L (ref 3.5–5.2)
PROT SERPL-MCNC: 6.7 G/DL (ref 6–8.5)
RBC # BLD AUTO: 5.37 10*6/MM3 (ref 4.14–5.8)
SODIUM SERPL-SCNC: 140 MMOL/L (ref 136–145)
TRIGL SERPL-MCNC: 109 MG/DL (ref 0–150)
VLDLC SERPL CALC-MCNC: 20 MG/DL (ref 5–40)
WBC # BLD AUTO: 9.03 10*3/MM3 (ref 3.4–10.8)

## 2025-05-16 ENCOUNTER — TELEPHONE (OUTPATIENT)
Dept: GASTROENTEROLOGY | Facility: CLINIC | Age: 60
End: 2025-05-16
Payer: COMMERCIAL

## 2025-05-16 NOTE — TELEPHONE ENCOUNTER
OPEN ACCESS COLONOSCOPY QUESTIONNAIRE        Are you currently experiencing any of the following symptoms?    Abdominal pain, bloating or cramping?  no  Change in bowel habits? no  Diarrhea? no  Constipation? no  Mucus in stool? no        Do you have a family history of any of the following?    Colon cancer? no  Colon polyps? no  Inflammatory bowel disease? no        Have you ever had a colonoscopy before? Yes  If yes, approximate date/year?   2017    Was the result normal? polyps        Have you recently had a medical change or new diagnosis? yes If so, what was the change? 2 knee replacements since 2020    Have you recently started any new medications? (Ie: blood thinners, GLP-1's) no    Do you have any issues with transportation? no

## 2025-05-16 NOTE — TELEPHONE ENCOUNTER
----- Message from Nicol ARTHUR sent at 5/15/2025 10:52 AM EDT -----  The patient is scheduled for an office consult for colon cancer screening.  Please contact to see if the patient would be interested in an open colon.  Thank you.

## 2025-05-20 DIAGNOSIS — Z86.0100 PERSONAL HISTORY OF COLON POLYPS, UNSPECIFIED: Primary | ICD-10-CM

## 2025-05-20 RX ORDER — SODIUM, POTASSIUM,MAG SULFATES 17.5-3.13G
2 SOLUTION, RECONSTITUTED, ORAL ORAL ONCE
Qty: 354 ML | Refills: 0 | Status: SHIPPED | OUTPATIENT
Start: 2025-05-20 | End: 2025-05-20

## 2025-07-09 NOTE — PRE-PROCEDURE INSTRUCTIONS
PAT phone history completed with patient for upcoming procedure on 7/21/25 with Dr. Noble.    PAT PASS reviewed with patient and they verbalize understanding of the following:     Do not eat or drink anything after midnight the night before procedure unless otherwise instructed by physician/surgeon's office, this includes no gum, candy, mints, tobacco products or e-cigarettes.  Do not shave the area to be operated on at least 48 hours prior to procedure.  Do not wear makeup, lotion, hair products, or nail polish.  Do not wear any jewelry and remove all piercings.  Do not wear any adhesive if you wear dentures.  Do not wear contacts; bring in glasses if needed.  Only take medications on the morning of procedure as instructed by PAT nurse per anesthesia guidelines or as instructed by physician's office.  If you are on any blood thinners reach out to the physician/surgeon's office for instructions on when/if they will need to be stopped prior to procedure.  Bring in picture ID and insurance card, advanced directive copies if applicable, CPAP/BIPAP/Inhalers if indicated morning of procedure, leave any other valuables at home.  Ensure you have arranged for someone to drive you home the day of your procedure and someone to care for you at home afterwards. It is recommended that you do not drive, drink alcohol, or make any major legal decisions for at least 24 hours after your procedure is complete.  ERAS instructions given unless otherwise instructed per surgeon's orders.    Instructions given on hospital entrance and registration location.

## 2025-07-21 ENCOUNTER — ANESTHESIA (OUTPATIENT)
Dept: GASTROENTEROLOGY | Facility: HOSPITAL | Age: 60
End: 2025-07-21
Payer: COMMERCIAL

## 2025-07-21 ENCOUNTER — ANESTHESIA EVENT (OUTPATIENT)
Dept: GASTROENTEROLOGY | Facility: HOSPITAL | Age: 60
End: 2025-07-21
Payer: COMMERCIAL

## 2025-07-21 ENCOUNTER — HOSPITAL ENCOUNTER (OUTPATIENT)
Facility: HOSPITAL | Age: 60
Setting detail: HOSPITAL OUTPATIENT SURGERY
Discharge: HOME | End: 2025-07-21
Attending: INTERNAL MEDICINE | Admitting: INTERNAL MEDICINE
Payer: COMMERCIAL

## 2025-07-21 VITALS
WEIGHT: 236 LBS | BODY MASS INDEX: 31.97 KG/M2 | TEMPERATURE: 97.9 F | DIASTOLIC BLOOD PRESSURE: 75 MMHG | SYSTOLIC BLOOD PRESSURE: 120 MMHG | RESPIRATION RATE: 16 BRPM | OXYGEN SATURATION: 95 % | HEIGHT: 72 IN | HEART RATE: 67 BPM

## 2025-07-21 DIAGNOSIS — Z86.0100 PERSONAL HISTORY OF COLON POLYPS, UNSPECIFIED: ICD-10-CM

## 2025-07-21 PROCEDURE — 45385 COLONOSCOPY W/LESION REMOVAL: CPT | Performed by: INTERNAL MEDICINE

## 2025-07-21 PROCEDURE — 25010000002 PROPOFOL 200 MG/20ML EMULSION: Performed by: NURSE ANESTHETIST, CERTIFIED REGISTERED

## 2025-07-21 RX ORDER — PROPOFOL 10 MG/ML
INJECTION, EMULSION INTRAVENOUS CONTINUOUS PRN
Status: DISCONTINUED | OUTPATIENT
Start: 2025-07-21 | End: 2025-07-21 | Stop reason: SURG

## 2025-07-21 RX ORDER — LIDOCAINE HCL/PF 100 MG/5ML
SYRINGE (ML) INJECTION AS NEEDED
Status: DISCONTINUED | OUTPATIENT
Start: 2025-07-21 | End: 2025-07-21 | Stop reason: SURG

## 2025-07-21 RX ORDER — SIMETHICONE 40MG/0.6ML
SUSPENSION, DROPS(FINAL DOSAGE FORM)(ML) ORAL AS NEEDED
Status: DISCONTINUED | OUTPATIENT
Start: 2025-07-21 | End: 2025-07-21 | Stop reason: HOSPADM

## 2025-07-21 RX ADMIN — PROPOFOL 100 MCG/KG/MIN: 10 INJECTION, EMULSION INTRAVENOUS at 09:42

## 2025-07-21 RX ADMIN — Medication 75 MG: at 09:42

## 2025-07-21 NOTE — H&P
"    [facility]  HISTORY AND PHYSICAL    Patient Name: Benjie Mcgovern  : 1965  MRN: [id number]    Chief Complaint:   For screening colonoscopy    History Of Presenting Illness:    Last colon over 8 yrs ago  H/o colon polyps     Past Medical History:   Diagnosis Date    Arthritis     Left knee    Atrial fibrillation     REPORTS WAS DIAGNOSED IN  AND THAT HE HAS HAD NO EPISODES SINCE THAT TIME    Cataract     DVT (deep venous thrombosis)     following left total knee-left leg    ED (erectile dysfunction) of non-organic origin     Elevated cholesterol     REPORTS WAS TOLD \"BORDERLINE\" BUT THAT HE HAS NEVER TAKEN MEDICATION    Foot pain     History of exercise stress test     REPORTS WAS TOLD THAT ALL WAS WNL'S    History of fracture     HISTORY OF LEFT WRIST AS A CHILD - HAD SURGICAL INTERVENTION    History of kidney stones     REPORTS MULTIPLE EPISODES AND THAT HE HAS REQUIRED SURGICAL INTERVENTION IN THE PAST    Hyperlipidemia     Hypertension     Impaired functional mobility, balance, gait, and endurance     Wears contact lenses     INSTRUCTED TO LEAVE CONTACTS OUT THE DOS       Past Surgical History:   Procedure Laterality Date    COLONOSCOPY N/A 2017    Procedure: COLONOSCOPY WITH BXS, POLYPECTOMY;  Surgeon: Fermín Hamilton MD;  Location: [facility] ENDOSCOPY;  Service:     FACIAL RECONSTRUCTION SURGERY      FROM DOG BITE AS A CHILD    FRACTURE SURGERY Left     WRIST AS  A CHILD    KIDNEY STONE SURGERY      KNEE ARTHROSCOPY Left 2018    Procedure: Knee diagnostic arthroscopy, left with partial medial menisectomy, removal of loose bodies and two compartment chondroplasty;  Surgeon: Dean Garcia MD;  Location: [facility] OR;  Service: Orthopedics    KNEE SURGERY Right     ATS PMM    TOTAL KNEE ARTHROPLASTY Left 2020    Procedure: total knee arthroplasty, Left;  Surgeon: Dean Garcia MD;  Location: [facility] OR;  Service: Orthopedics;  Laterality: Left;    " TOTAL KNEE ARTHROPLASTY Right 11/22/2022    Procedure: Right total knee arthroplasty;  Surgeon: Dean Garcia MD;  Location: [facility];  Service: Orthopedics;  Laterality: Right;    VASECTOMY         Social History     Socioeconomic History    Marital status:    Tobacco Use    Smoking status: Never    Smokeless tobacco: Never   Vaping Use    Vaping status: Never Used   Substance and Sexual Activity    Alcohol use: No    Drug use: No    Sexual activity: Defer       Family History   Problem Relation Age of Onset    Other Other         brain tumor, renal disease    Cancer Other     Stroke Other     Colon cancer Neg Hx        Prior to Admission Medications:  Medications Prior to Admission   Medication Sig Dispense Refill Last Dose/Taking    aspirin 81 MG EC tablet Take 1 tablet by mouth Daily.   7/18/2025    dilTIAZem CD (CARDIZEM CD) 120 MG 24 hr capsule Take 1 capsule by mouth Every Night. 90 capsule 1 7/19/2025    losartan (COZAAR) 50 MG tablet Take 1 tablet by mouth Daily. 90 tablet 1 7/20/2025       Allergies:  No Known Allergies     Vitals: Temp:  [98 °F (36.7 °C)] 98 °F (36.7 °C)  Heart Rate:  [68] 68  Resp:  [16] 16  BP: (128)/(95) 128/95    Review Of Systems:  Constitutional:  Negative for chills, fever, and unexpected weight change.  Respiratory:  Negative for cough, chest tightness, shortness of breath, and wheezing.  Cardiovascular:  Negative for chest pain, palpitations, and leg swelling.  Gastrointestinal:  Negative for abdominal distention, abdominal pain, nausea, vomiting.  Neurological:  Negative for weakness, numbness, and headaches.     Physical Exam:    General Appearance:  Alert, cooperative, in no acute distress.   Lungs:   Clear to auscultation, respirations regular, even and                 unlabored.   Heart:  Regular rhythm and normal rate.   Abdomen:   Normal bowel sounds, no masses, no organomegaly. Soft, nontender, nondistended   Neurologic: Alert and oriented x 3. Moves all  four limbs equally       Assessment & Plan     Assessment:  Principal Problem:    Personal history of colon polyps, unspecified      Plan: Colonoscopy with  possible biopsy, polypectomy, ablation of arteriovenous malformations, or control of bleeding (N/A)     Dayanna Noble MD  7/21/2025

## 2025-07-21 NOTE — DISCHARGE INSTRUCTIONS
Rest today  No pushing,pulling,tugging,heavy lifting, or strenuous activity   No major decision making,driving,or drinking alcoholic beverages for 24 hours due to the sedation you received  Always use good hand hygiene/washing technique  No driving on pain medication.    To assist you in voiding:  Drink plenty of fluids  Listen to running water while attempting to void.    If you are unable to urinate and you have an uncomfortable urge to void or it has been   6 hours since you were discharged, return to the Emergency Room.    - Discharge patient to home (ambulatory).   - High fiber diet.   - Continue present medications.   - Await pathology results.   - Repeat colonoscopy in 5 years for surveillance.   - Return to GI office in prn   - To call for path report in 1-2 weeks     COMMON SIDE EFFECTS OF SEDATION  Nausea and Vomiting- often occurs within the first few hours after surgery  Dry Mouth- due to reduced production of saliva during surgery  Drowsiness- may last a few hours after surgery or even into the next day  Chills or Shivering- due to body temperature changes during surgery  Temporary Confusion or Disorientation- most common in older adults or with higher sedation doses  Dizziness- often occurs when moving too quickly after waking

## 2025-07-21 NOTE — ANESTHESIA PREPROCEDURE EVALUATION
Anesthesia Evaluation     Patient summary reviewed and Nursing notes reviewed   no history of anesthetic complications:   NPO Solid Status: > 8 hours  NPO Liquid Status: > 8 hours           Airway   Mallampati: II  TM distance: >3 FB  Neck ROM: full  Possible difficult intubation  Dental - normal exam     Pulmonary - negative pulmonary ROS and normal exam   Cardiovascular - normal exam  Exercise tolerance: good (4-7 METS)    (+) hypertension, dysrhythmias Paroxysmal Atrial Fib, DVT, hyperlipidemia      Neuro/Psych- negative ROS  GI/Hepatic/Renal/Endo    (+) obesity    Musculoskeletal     Abdominal   (+) obese   Substance History - negative use     OB/GYN negative ob/gyn ROS         Other   arthritis,                       Anesthesia Plan    ASA 3     MAC     (Risks and benefits discussed including risk of aspiration, recall and dental damage. All patient questions answered.    Will continue with plan of care.)  intravenous induction     Anesthetic plan, risks, benefits, and alternatives have been provided, discussed and informed consent has been obtained with: patient.

## 2025-07-21 NOTE — ANESTHESIA POSTPROCEDURE EVALUATION
Patient: Benjie Mcgovern    Procedure Summary       Date: 07/21/25 [facility]    Anesthesia Start: 0944 Anesthesia Stop: 1000    Procedure: Colonoscopy with polypectomy (Anus) Diagnosis:       Personal history of colon polyps, unspecified      (Personal history of colon polyps, unspecified [Z86.0100])    Surgeons: Dayanna Noble MD Provider: Noe Tubbs CRNA    Anesthesia Type: MAC ASA Status: 3            Anesthesia Type: MAC    Vitals  No vitals data found for the desired time range.          Post Anesthesia Care and Evaluation    Patient location during evaluation: bedside  Patient participation: complete - patient participated  Level of consciousness: awake and alert  Pain score: 0  Pain management: adequate    Airway patency: patent  Anesthetic complications: No anesthetic complications  PONV Status: none  Cardiovascular status: acceptable  Respiratory status: acceptable  Hydration status: acceptable

## 2025-07-22 LAB — REF LAB TEST METHOD: NORMAL

## 2025-07-24 ENCOUNTER — OFFICE VISIT (OUTPATIENT)
Dept: INTERNAL MEDICINE | Facility: CLINIC | Age: 60
End: 2025-07-24
Payer: COMMERCIAL

## 2025-07-24 ENCOUNTER — TELEPHONE (OUTPATIENT)
Dept: GASTROENTEROLOGY | Facility: CLINIC | Age: 60
End: 2025-07-24
Payer: COMMERCIAL

## 2025-07-24 VITALS
TEMPERATURE: 97.4 F | HEART RATE: 61 BPM | WEIGHT: 245 LBS | SYSTOLIC BLOOD PRESSURE: 141 MMHG | DIASTOLIC BLOOD PRESSURE: 91 MMHG | BODY MASS INDEX: 33.18 KG/M2 | RESPIRATION RATE: 18 BRPM | HEIGHT: 72 IN | OXYGEN SATURATION: 100 %

## 2025-07-24 DIAGNOSIS — I10 BENIGN ESSENTIAL HYPERTENSION: Primary | ICD-10-CM

## 2025-07-24 DIAGNOSIS — E78.2 MIXED HYPERLIPIDEMIA: ICD-10-CM

## 2025-07-24 DIAGNOSIS — R73.01 IMPAIRED FASTING GLUCOSE: ICD-10-CM

## 2025-07-24 PROCEDURE — 99214 OFFICE O/P EST MOD 30 MIN: CPT | Performed by: INTERNAL MEDICINE

## 2025-07-24 RX ORDER — LOSARTAN POTASSIUM 50 MG/1
50 TABLET ORAL DAILY
Qty: 90 TABLET | Refills: 2 | Status: SHIPPED | OUTPATIENT
Start: 2025-07-24

## 2025-07-24 RX ORDER — DILTIAZEM HYDROCHLORIDE 120 MG/1
120 CAPSULE, COATED, EXTENDED RELEASE ORAL NIGHTLY
Qty: 90 CAPSULE | Refills: 2 | Status: SHIPPED | OUTPATIENT
Start: 2025-07-24

## 2025-07-24 NOTE — PROGRESS NOTES
"Chief Complaint  Hypertension and Hyperlipidemia    Subjective        Benjie Mcgovern presents to [facility]    HPI: Patient is here to follow up on the blood pressure  The patient is taking the blood pressure medications as prescribed and has had no side effects. The patient is also here to follow up on the cholesterol and  is  due to get lab work done .  The patient also needs refills on medications .   Hyperlipidemia   Pertinent negatives include no chest pain or shortness of breath.   Hypertension   Pertinent negatives include no chest pain, palpitations or shortness of breath.    Objective   Vital Signs:  /91   Pulse 61   Temp 97.4 °F (36.3 °C) (Temporal)   Resp 18   Ht 182.9 cm (72\")   Wt 111 kg (245 lb)   SpO2 100%   BMI 33.23 kg/m²   Estimated body mass index is 33.23 kg/m² as calculated from the following:    Height as of this encounter: 182.9 cm (72\").    Weight as of this encounter: 111 kg (245 lb).            Physical Exam  Vitals and nursing note reviewed.   Constitutional:       General: He is not in acute distress.     Appearance: Normal appearance. He is obese. He is not diaphoretic.   HENT:      Head: Normocephalic and atraumatic.      Right Ear: External ear normal.      Left Ear: External ear normal.      Nose: Nose normal.   Eyes:      Extraocular Movements: Extraocular movements intact.      Conjunctiva/sclera: Conjunctivae normal.   Neck:      Trachea: Trachea normal.   Cardiovascular:      Rate and Rhythm: Normal rate and regular rhythm.      Heart sounds: Normal heart sounds.   Pulmonary:      Effort: Pulmonary effort is normal. No respiratory distress.   Abdominal:      General: Abdomen is flat.   Musculoskeletal:      Cervical back: Neck supple.      Comments: Moves all limbs   Skin:     General: Skin is warm and dry.      Findings: No erythema.   Neurological:      Mental Status: He is alert and oriented to person, place, and time.      Comments: " No gross motor or sensory deficits        Result Review :  The following data was reviewed by: Evie Martin MD on 07/24/2025:  Common labs          8/19/2024    09:17 2/10/2025    10:51   Common Labs   Glucose 99  98    BUN 15  15    Creatinine 0.79  0.89    Sodium 139  140    Potassium 4.5  4.5    Chloride 104  105    Calcium 9.2  9.5    Albumin 4.3  4.0    Total Bilirubin 0.3  0.4    Alkaline Phosphatase 126  129    AST (SGOT) 15  16    ALT (SGPT) 15  15    WBC 7.59  9.03    Hemoglobin 16.2  16.5    Hematocrit 47.7  48.5    Platelets 230  266    Total Cholesterol 158  162    Triglycerides 119  109    HDL Cholesterol 39  38    LDL Cholesterol  97  104    Hemoglobin A1C 5.80  5.80                Assessment and Plan   Diagnoses and all orders for this visit:    1. Benign essential hypertension (Primary)  -     dilTIAZem CD (CARDIZEM CD) 120 MG 24 hr capsule; Take 1 capsule by mouth Every Night.  Dispense: 90 capsule; Refill: 2  -     losartan (COZAAR) 50 MG tablet; Take 1 tablet by mouth Daily.  Dispense: 90 tablet; Refill: 2    2. Mixed hyperlipidemia  -     CBC (No Diff)  -     Comprehensive Metabolic Panel  -     Lipid Panel    3. Impaired fasting glucose  -     Hemoglobin A1c    Plan:  1.  Benign essential hypertension: Will continue current medication, low-sodium diet advised, Counseled to regularly check BP at home with goal averaging <130/80.   2.mixed hyperlipidemia:   obtain  fasting CMP and lipid panel.  Diet and exercise counseled,    3. impaired glucose   :   obtain  fasting CMP  and hba1c   5.8, diet and exercise counseled            Follow Up   Return in about 6 months (around 1/27/2026).  Patient was given instructions and counseling regarding his condition or for health maintenance advice. Please see specific information pulled into the AVS if appropriate.

## 2025-07-24 NOTE — TELEPHONE ENCOUNTER
----- Message from Cristi Smith sent at 7/24/2025  9:20 AM EDT -----  Let him know one polyp was a tubular adenoma, but no dysplasia. The other polyps were hyperplastic. He needs follow up colonoscopy in 5 years, July 2030.  ----- Message -----  From: Nicol Diaz RegSched Rep  Sent: 7/21/2025  10:29 AM EDT  To: JOEL Damon    Patient had a colonoscopy 7/21/25.  He is to follow-up prn and pathology results called to him.  I have entered a 5 yr colon recall.

## (undated) DEVICE — PK KN TOTL 20

## (undated) DEVICE — Device

## (undated) DEVICE — VIOLET BRAIDED (POLYGLACTIN 910), SYNTHETIC ABSORBABLE SUTURE: Brand: COATED VICRYL

## (undated) DEVICE — GLV SURG SENSICARE W/ALOE PF LF 8 STRL

## (undated) DEVICE — DRSNG SURG AQUACEL AG 9X25CM

## (undated) DEVICE — NEEDLE, QUINCKE, 18GX3.5": Brand: MEDLINE

## (undated) DEVICE — SHEET,DRAPE,70X100,STERILE: Brand: MEDLINE

## (undated) DEVICE — PAD GRND REM POLYHESIVE A/ DISP

## (undated) DEVICE — WRAP KNEE COLD THERAPY

## (undated) DEVICE — BLD CLIP UNIV SURG GRY

## (undated) DEVICE — CUFF SCD HEMOFORCE SEQ CALF STD MD

## (undated) DEVICE — SYR PREFIL W/SALINE FLSH 10ML

## (undated) DEVICE — SLV SCD CALF HEMOFORCE DVT THERP REPROC MD

## (undated) DEVICE — GLV SURG SENSICARE ORTHO PF LF 8 STRL

## (undated) DEVICE — GOWN,PREVENTION PLUS,XLARGE,STERILE: Brand: MEDLINE

## (undated) DEVICE — FLEXIBLE YANKAUER,MEDIUM TIP, NO VACUUM CONTROL: Brand: ARGYLE

## (undated) DEVICE — BOWL AND CEMENT CARTRIDGE WITH BREAKAWAY FEMORAL NOZZLE AND MEDIUM PRESSURIZER: Brand: ACM

## (undated) DEVICE — T-DRAPE,EXTREMITY,STERILE: Brand: MEDLINE

## (undated) DEVICE — SPNG LAP 18X18IN LF STRL PK/5

## (undated) DEVICE — TBG PENCL TELESCP MEGADYNE SMOKE EVAC 10FT

## (undated) DEVICE — PK KN ARTHSCP 20

## (undated) DEVICE — DRSNG WND BORDR/ADHS NONADHR/GZ LF 4X4IN STRL

## (undated) DEVICE — AMD ANTIMICROBIAL NON-ADHERENT ISLAND DRESSING,0.2% POLYHEXAMETHYLENE BIGUANIDE HCI (PHMB): Brand: TELFA

## (undated) DEVICE — GLV SURG SENSICARE SLT PF LF 8 STRL

## (undated) DEVICE — CANISTER, RIGID, 2000CC: Brand: MEDLINE INDUSTRIES, INC.

## (undated) DEVICE — VLV SXN AIR/H2O ORCAPOD3 1P/U STRL

## (undated) DEVICE — SUT VIC 2/0 CT1 27IN J259H

## (undated) DEVICE — SYR LUER SLPTP 50ML

## (undated) DEVICE — ENDOGATOR AUXILIARY WATER JET CONNECTOR: Brand: ENDOGATOR

## (undated) DEVICE — GLV SURG TRIUMPH ORTHO W/ALOE PF LTX 8 STRL

## (undated) DEVICE — SPNG GZ WOVN 4X4IN 12PLY 10/BX STRL

## (undated) DEVICE — 2108 SERIES SAGITTAL BLADE, NO OFFSET  (24.8 X 1.24 X 80.1MM)

## (undated) DEVICE — JELLY,LUBE,STERILE,FLIP TOP,TUBE,2-OZ: Brand: MEDLINE

## (undated) DEVICE — SOL IRR NACL 0.9PCT 3000ML

## (undated) DEVICE — SYR LUERLOK 50ML

## (undated) DEVICE — OCCLUSIVE GAUZE STRIP,3% BISMUTH TRIBROMOPHENATE IN PETROLATUM BLEND: Brand: XEROFORM

## (undated) DEVICE — 3M™ STERI-DRAPE™ U-DRAPE 1015: Brand: STERI-DRAPE™

## (undated) DEVICE — HANDPIECE SET WITH HIGH FLOW TIP AND SUCTION TUBE: Brand: INTERPULSE

## (undated) DEVICE — SPNG GZ STRL 2S 4X4 12PLY

## (undated) DEVICE — PROXIMATE SKIN STAPLERS (35 WIDE) CONTAINS 35 STAINLESS STEEL STAPLES (FIXED HEAD): Brand: PROXIMATE

## (undated) DEVICE — DRSNG WND GZ PAD BORDERED LF 4X5IN STRL

## (undated) DEVICE — SINGLE-USE POLYPECTOMY SNARE: Brand: CAPTIVATOR II

## (undated) DEVICE — BIT DRL 2.5MM

## (undated) DEVICE — QUICK CATCH IN-LINE SUCTION POLYP TRAP IS USED FOR SUCTION RETRIEVAL OF ENDOSCOPICALLY REMOVED POLYPS.

## (undated) DEVICE — DISPOSABLE TOURNIQUET CUFF SINGLE BLADDER, SINGLE PORT AND QUICK CONNECT CONNECTOR: Brand: COLOR CUFF

## (undated) DEVICE — FRCP BIOP COLD ENDOJAW ALLGTR W/NDL 2.8X2300MM BLU

## (undated) DEVICE — ENDOSCOPY PORT CONNECTOR FOR OLYMPUS® SCOPES: Brand: ERBE

## (undated) DEVICE — 4.5 MM FULL RADIUS STRAIGHT                                    BLADES, POWER/EP-1, YELLOW, PACKAGED                                    6 PER BOX, STERILE: Brand: DYONICS

## (undated) DEVICE — DRSNG WND BORDR/ADHS NONADHR/GZ LF 4X10IN STRL

## (undated) DEVICE — TBG INFLOW FMS DUO W/O 1WY VLV

## (undated) DEVICE — HYBRID CO2 TUBING/CAP SET FOR OLYMPUS® SCOPES & CO2 SOURCE: Brand: ERBE

## (undated) DEVICE — LUBE JELLY PK/2.75GM STRL BX/144